# Patient Record
Sex: MALE | Race: WHITE | Employment: OTHER | URBAN - METROPOLITAN AREA
[De-identification: names, ages, dates, MRNs, and addresses within clinical notes are randomized per-mention and may not be internally consistent; named-entity substitution may affect disease eponyms.]

---

## 2017-01-03 ENCOUNTER — TELEPHONE (OUTPATIENT)
Dept: FAMILY MEDICINE CLINIC | Age: 57
End: 2017-01-03

## 2017-01-03 ENCOUNTER — TELEPHONE (OUTPATIENT)
Dept: ORTHOPEDIC SURGERY | Age: 57
End: 2017-01-03

## 2017-01-03 RX ORDER — NAPROXEN 500 MG/1
500 TABLET ORAL 2 TIMES DAILY WITH MEALS
Qty: 40 TABLET | Refills: 0 | Status: SHIPPED | OUTPATIENT
Start: 2017-01-03 | End: 2017-01-23 | Stop reason: SDUPTHER

## 2017-01-04 ENCOUNTER — OFFICE VISIT (OUTPATIENT)
Dept: ORTHOPEDIC SURGERY | Age: 57
End: 2017-01-04

## 2017-01-04 ENCOUNTER — TELEPHONE (OUTPATIENT)
Dept: ORTHOPEDIC SURGERY | Age: 57
End: 2017-01-04

## 2017-01-04 VITALS
BODY MASS INDEX: 22.91 KG/M2 | WEIGHT: 146 LBS | HEART RATE: 92 BPM | DIASTOLIC BLOOD PRESSURE: 81 MMHG | HEIGHT: 67 IN | SYSTOLIC BLOOD PRESSURE: 139 MMHG

## 2017-01-04 DIAGNOSIS — S83.241D TEAR OF MEDIAL MENISCUS OF RIGHT KNEE, CURRENT, UNSPECIFIED TEAR TYPE, SUBSEQUENT ENCOUNTER: Primary | ICD-10-CM

## 2017-01-04 PROCEDURE — 99213 OFFICE O/P EST LOW 20 MIN: CPT | Performed by: ORTHOPAEDIC SURGERY

## 2017-01-09 ENCOUNTER — OFFICE VISIT (OUTPATIENT)
Dept: FAMILY MEDICINE CLINIC | Age: 57
End: 2017-01-09

## 2017-01-09 VITALS
WEIGHT: 150 LBS | DIASTOLIC BLOOD PRESSURE: 90 MMHG | SYSTOLIC BLOOD PRESSURE: 130 MMHG | OXYGEN SATURATION: 97 % | BODY MASS INDEX: 23.49 KG/M2 | HEART RATE: 100 BPM

## 2017-01-09 DIAGNOSIS — G56.22 ULNAR NEUROPATHY OF LEFT UPPER EXTREMITY: ICD-10-CM

## 2017-01-09 DIAGNOSIS — D75.89 MACROCYTOSIS WITHOUT ANEMIA: ICD-10-CM

## 2017-01-09 PROCEDURE — 99213 OFFICE O/P EST LOW 20 MIN: CPT | Performed by: FAMILY MEDICINE

## 2017-01-23 ENCOUNTER — PROCEDURE VISIT (OUTPATIENT)
Dept: NEUROLOGY | Age: 57
End: 2017-01-23

## 2017-01-23 ENCOUNTER — TELEPHONE (OUTPATIENT)
Dept: ORTHOPEDIC SURGERY | Age: 57
End: 2017-01-23

## 2017-01-23 DIAGNOSIS — G56.22 ULNAR NEUROPATHY OF LEFT UPPER EXTREMITY: Primary | ICD-10-CM

## 2017-01-23 PROCEDURE — 95886 MUSC TEST DONE W/N TEST COMP: CPT | Performed by: PSYCHIATRY & NEUROLOGY

## 2017-01-23 PROCEDURE — 95909 NRV CNDJ TST 5-6 STUDIES: CPT | Performed by: PSYCHIATRY & NEUROLOGY

## 2017-01-23 RX ORDER — NAPROXEN 500 MG/1
TABLET ORAL
Qty: 60 TABLET | Refills: 2 | Status: ON HOLD | OUTPATIENT
Start: 2017-01-23 | End: 2017-03-29 | Stop reason: HOSPADM

## 2017-01-31 ENCOUNTER — HOSPITAL ENCOUNTER (OUTPATIENT)
Dept: MRI IMAGING | Age: 57
Discharge: HOME | End: 2017-01-31
Attending: ORTHOPAEDIC SURGERY | Admitting: ORTHOPAEDIC SURGERY

## 2017-01-31 DIAGNOSIS — S83.241D TEAR OF MEDIAL MENISCUS OF RIGHT KNEE, CURRENT, UNSPECIFIED TEAR TYPE, SUBSEQUENT ENCOUNTER: ICD-10-CM

## 2017-01-31 DIAGNOSIS — S83.241D OTHER TEAR OF MEDIAL MENISCUS, CURRENT INJURY, RIGHT KNEE, SUBSEQUENT ENCOUNTER: ICD-10-CM

## 2017-02-02 LAB
A/G RATIO: 1.9 (CALC) (ref 1–2.5)
ALBUMIN SERPL-MCNC: 4.4 G/DL (ref 3.6–5.1)
ALP BLD-CCNC: 54 U/L (ref 40–115)
ALT SERPL-CCNC: 12 U/L (ref 9–46)
AST SERPL-CCNC: 15 U/L (ref 10–35)
BASOPHILS ABSOLUTE: 77 CELLS/UL (ref 0–200)
BASOPHILS RELATIVE PERCENT: 1.6 %
BILIRUB SERPL-MCNC: 0.6 MG/DL (ref 0.2–1.2)
BUN / CREAT RATIO: NORMAL (CALC) (ref 6–22)
BUN BLDV-MCNC: 12 MG/DL (ref 7–25)
CALCIUM SERPL-MCNC: 9.2 MG/DL (ref 8.6–10.3)
CHLORIDE BLD-SCNC: 106 MMOL/L (ref 98–110)
CHOLESTEROL, TOTAL: 218 MG/DL (ref 125–200)
CHOLESTEROL/HDL RATIO: 2.4 (CALC)
CHOLESTEROL: 129 MG/DL (CALC)
CO2: 26 MMOL/L (ref 20–31)
CREAT SERPL-MCNC: 0.8 MG/DL (ref 0.7–1.33)
EOSINOPHILS ABSOLUTE: 130 CELLS/UL (ref 15–500)
EOSINOPHILS RELATIVE PERCENT: 2.7 %
FOLATE: >24 NG/ML
GFR AFRICAN AMERICAN: 116 ML/MIN/1.73M2
GFR SERPL CREATININE-BSD FRML MDRD: 100 ML/MIN/1.73M2
GLOBULIN: 2.3 G/DL (CALC) (ref 1.9–3.7)
GLUCOSE BLD-MCNC: 95 MG/DL (ref 65–99)
HCT VFR BLD CALC: 43.6 % (ref 38.5–50)
HDLC SERPL-MCNC: 89 MG/DL
HEMOGLOBIN: 14.8 G/DL (ref 13.2–17.1)
LDL CHOLESTEROL CALCULATED: 117 MG/DL (CALC)
LYMPHOCYTES ABSOLUTE: 1373 CELLS/UL (ref 850–3900)
LYMPHOCYTES RELATIVE PERCENT: 28.6 %
MCH RBC QN AUTO: 32.4 PG (ref 27–33)
MCHC RBC AUTO-ENTMCNC: 33.8 G/DL (ref 32–36)
MCV RBC AUTO: 95.7 FL (ref 80–100)
MONOCYTES ABSOLUTE: 446 CELLS/UL (ref 200–950)
MONOCYTES RELATIVE PERCENT: 9.3 %
NEUTROPHILS ABSOLUTE: 2774 CELLS/UL (ref 1500–7800)
PDW BLD-RTO: 13.8 % (ref 11–15)
PLATELET # BLD: 265 THOUSAND/UL (ref 140–400)
PMV BLD AUTO: 7.6 FL (ref 7.5–12.5)
POTASSIUM SERPL-SCNC: 4.6 MMOL/L (ref 3.5–5.3)
RBC # BLD: 4.55 MILLION/UL (ref 4.2–5.8)
SEGMENTED NEUTROPHILS RELATIVE PERCENT: 57.8 %
SODIUM BLD-SCNC: 142 MMOL/L (ref 135–146)
TOTAL PROTEIN: 6.7 G/DL (ref 6.1–8.1)
TRIGL SERPL-MCNC: 60 MG/DL
VITAMIN B-12: 390 PG/ML (ref 200–1100)
WBC # BLD: 4.8 THOUSAND/UL (ref 3.8–10.8)

## 2017-02-06 DIAGNOSIS — G56.22 ULNAR NEUROPATHY OF LEFT UPPER EXTREMITY: Primary | ICD-10-CM

## 2017-02-20 ENCOUNTER — TELEPHONE (OUTPATIENT)
Dept: FAMILY MEDICINE CLINIC | Age: 57
End: 2017-02-20

## 2017-02-22 ENCOUNTER — OFFICE VISIT (OUTPATIENT)
Dept: ORTHOPEDIC SURGERY | Age: 57
End: 2017-02-22

## 2017-02-22 VITALS
RESPIRATION RATE: 16 BRPM | DIASTOLIC BLOOD PRESSURE: 85 MMHG | HEIGHT: 67 IN | BODY MASS INDEX: 23.54 KG/M2 | WEIGHT: 150 LBS | HEART RATE: 107 BPM | SYSTOLIC BLOOD PRESSURE: 139 MMHG

## 2017-02-22 DIAGNOSIS — G56.22 CUBITAL TUNNEL SYNDROME ON LEFT: ICD-10-CM

## 2017-02-22 PROCEDURE — 99243 OFF/OP CNSLTJ NEW/EST LOW 30: CPT | Performed by: ORTHOPAEDIC SURGERY

## 2017-02-24 ENCOUNTER — OFFICE VISIT (OUTPATIENT)
Dept: ORTHOPEDIC SURGERY | Age: 57
End: 2017-02-24

## 2017-02-24 VITALS
HEART RATE: 71 BPM | DIASTOLIC BLOOD PRESSURE: 83 MMHG | WEIGHT: 150 LBS | BODY MASS INDEX: 23.54 KG/M2 | SYSTOLIC BLOOD PRESSURE: 129 MMHG | HEIGHT: 67 IN

## 2017-02-24 DIAGNOSIS — M25.461 KNEE EFFUSION, RIGHT: Primary | ICD-10-CM

## 2017-02-24 DIAGNOSIS — M87.051 AVASCULAR NECROSIS OF MEDIAL CONDYLE OF RIGHT FEMUR (HCC): ICD-10-CM

## 2017-02-24 PROCEDURE — 99213 OFFICE O/P EST LOW 20 MIN: CPT | Performed by: ORTHOPAEDIC SURGERY

## 2017-02-28 ENCOUNTER — TELEPHONE (OUTPATIENT)
Dept: ORTHOPEDIC SURGERY | Age: 57
End: 2017-02-28

## 2017-03-13 ENCOUNTER — HOSPITAL ENCOUNTER (OUTPATIENT)
Dept: PREADMISSION TESTING | Age: 57
Discharge: HOME | End: 2017-03-13
Attending: ORTHOPAEDIC SURGERY | Admitting: ORTHOPAEDIC SURGERY

## 2017-03-13 VITALS — BODY MASS INDEX: 21.22 KG/M2 | HEIGHT: 68 IN | WEIGHT: 140 LBS

## 2017-03-13 LAB
ABO/RH: NORMAL
ALBUMIN SERPL-MCNC: 4.6 G/DL (ref 3.4–5)
ANION GAP SERPL CALCULATED.3IONS-SCNC: 11 MMOL/L (ref 3–16)
ANTIBODY SCREEN: NORMAL
APTT: 29.7 SEC (ref 21–31.8)
BASOPHILS ABSOLUTE: 0 K/UL (ref 0–0.2)
BASOPHILS RELATIVE PERCENT: 0.5 %
BILIRUBIN URINE: NEGATIVE
BLOOD, URINE: NEGATIVE
BUN BLDV-MCNC: 10 MG/DL (ref 7–20)
CALCIUM SERPL-MCNC: 9.5 MG/DL (ref 8.3–10.6)
CHLORIDE BLD-SCNC: 102 MMOL/L (ref 99–110)
CLARITY: CLEAR
CO2: 30 MMOL/L (ref 21–32)
COLOR: YELLOW
CREAT SERPL-MCNC: 0.7 MG/DL (ref 0.9–1.3)
EOSINOPHILS ABSOLUTE: 0.2 K/UL (ref 0–0.6)
EOSINOPHILS RELATIVE PERCENT: 2.4 %
EPITHELIAL CELLS, UA: 1 /HPF (ref 0–5)
GFR AFRICAN AMERICAN: >60
GFR NON-AFRICAN AMERICAN: >60
GLUCOSE BLD-MCNC: 84 MG/DL (ref 70–99)
GLUCOSE URINE: NEGATIVE MG/DL
HCT VFR BLD CALC: 44.6 % (ref 40.5–52.5)
HEMOGLOBIN: 15.3 G/DL (ref 13.5–17.5)
HYALINE CASTS: 0 /HPF (ref 0–8)
INR BLD: 0.88 (ref 0.85–1.15)
KETONES, URINE: NEGATIVE MG/DL
LEUKOCYTE ESTERASE, URINE: NEGATIVE
LYMPHOCYTES ABSOLUTE: 1.3 K/UL (ref 1–5.1)
LYMPHOCYTES RELATIVE PERCENT: 17.8 %
MCH RBC QN AUTO: 32.7 PG (ref 26–34)
MCHC RBC AUTO-ENTMCNC: 34.2 G/DL (ref 31–36)
MCV RBC AUTO: 95.8 FL (ref 80–100)
MICROSCOPIC EXAMINATION: YES
MONOCYTES ABSOLUTE: 1 K/UL (ref 0–1.3)
MONOCYTES RELATIVE PERCENT: 12.8 %
NEUTROPHILS ABSOLUTE: 5 K/UL (ref 1.7–7.7)
NEUTROPHILS RELATIVE PERCENT: 66.5 %
NITRITE, URINE: NEGATIVE
PDW BLD-RTO: 13.4 % (ref 12.4–15.4)
PH UA: 6
PLATELET # BLD: 251 K/UL (ref 135–450)
PMV BLD AUTO: 7 FL (ref 5–10.5)
POTASSIUM SERPL-SCNC: 4.4 MMOL/L (ref 3.5–5.1)
PROTEIN UA: 30 MG/DL
PROTHROMBIN TIME: 9.9 SEC (ref 9.6–13)
RBC # BLD: 4.66 M/UL (ref 4.2–5.9)
RBC UA: 5 /HPF (ref 0–4)
SODIUM BLD-SCNC: 143 MMOL/L (ref 136–145)
SPECIFIC GRAVITY UA: 1.03
URINE TYPE: ABNORMAL
UROBILINOGEN, URINE: 0.2 E.U./DL
WBC # BLD: 7.5 K/UL (ref 4–11)
WBC UA: 0 /HPF (ref 0–5)

## 2017-03-13 RX ORDER — SODIUM CHLORIDE, SODIUM LACTATE, POTASSIUM CHLORIDE, CALCIUM CHLORIDE 600; 310; 30; 20 MG/100ML; MG/100ML; MG/100ML; MG/100ML
INJECTION, SOLUTION INTRAVENOUS CONTINUOUS
Status: CANCELLED | OUTPATIENT
Start: 2017-03-28

## 2017-03-13 RX ORDER — LIDOCAINE HYDROCHLORIDE 10 MG/ML
0.5 INJECTION, SOLUTION EPIDURAL; INFILTRATION; INTRACAUDAL; PERINEURAL ONCE
Status: CANCELLED | OUTPATIENT
Start: 2017-03-28

## 2017-03-13 RX ORDER — CELECOXIB 200 MG/1
400 CAPSULE ORAL
Status: CANCELLED | OUTPATIENT
Start: 2017-03-28 | End: 2017-03-28

## 2017-03-13 ASSESSMENT — COPD QUESTIONNAIRES: CAT_SEVERITY: MODERATE

## 2017-03-14 LAB
EKG ATRIAL RATE: 81 BPM
EKG DIAGNOSIS: NORMAL
EKG P AXIS: 59 DEGREES
EKG P-R INTERVAL: 136 MS
EKG Q-T INTERVAL: 364 MS
EKG QRS DURATION: 82 MS
EKG QTC CALCULATION (BAZETT): 422 MS
EKG R AXIS: 67 DEGREES
EKG T AXIS: 44 DEGREES
EKG VENTRICULAR RATE: 81 BPM
URINE CULTURE, ROUTINE: NORMAL

## 2017-03-14 PROCEDURE — 93010 ELECTROCARDIOGRAM REPORT: CPT | Performed by: INTERNAL MEDICINE

## 2017-03-16 LAB — MRSA CULTURE ONLY: NORMAL

## 2017-03-17 ENCOUNTER — OFFICE VISIT (OUTPATIENT)
Dept: FAMILY MEDICINE CLINIC | Age: 57
End: 2017-03-17

## 2017-03-17 VITALS
OXYGEN SATURATION: 98 % | WEIGHT: 154 LBS | SYSTOLIC BLOOD PRESSURE: 130 MMHG | BODY MASS INDEX: 25.66 KG/M2 | HEIGHT: 65 IN | DIASTOLIC BLOOD PRESSURE: 75 MMHG | HEART RATE: 94 BPM

## 2017-03-17 DIAGNOSIS — Z01.818 PRE-OP EXAM: Primary | ICD-10-CM

## 2017-03-17 DIAGNOSIS — Z83.2 FAMILY HISTORY OF CLOTTING DISORDER: ICD-10-CM

## 2017-03-17 PROCEDURE — 99213 OFFICE O/P EST LOW 20 MIN: CPT | Performed by: FAMILY MEDICINE

## 2017-03-20 ENCOUNTER — HOSPITAL ENCOUNTER (OUTPATIENT)
Dept: OTHER | Age: 57
Discharge: HOME | End: 2017-03-20
Attending: FAMILY MEDICINE | Admitting: FAMILY MEDICINE

## 2017-03-20 DIAGNOSIS — Z01.818 PRE-OP EXAM: ICD-10-CM

## 2017-03-20 DIAGNOSIS — Z83.2 FAMILY HISTORY OF CLOTTING DISORDER: ICD-10-CM

## 2017-03-23 LAB
FACTOR V LEIDEN: NEGATIVE
SPECIMEN: NORMAL

## 2017-03-28 PROBLEM — M87.051 AVASCULAR NECROSIS OF MEDIAL CONDYLE OF RIGHT FEMUR (HCC): Status: ACTIVE | Noted: 2017-03-28

## 2017-03-31 ENCOUNTER — HOSPITAL ENCOUNTER (OUTPATIENT)
Dept: PHYSICAL THERAPY | Age: 57
Discharge: HOME | End: 2017-03-31
Admitting: ORTHOPAEDIC SURGERY

## 2017-03-31 ASSESSMENT — PAIN DESCRIPTION - LOCATION: LOCATION: KNEE

## 2017-03-31 ASSESSMENT — PAIN DESCRIPTION - ORIENTATION: ORIENTATION: RIGHT

## 2017-03-31 ASSESSMENT — PAIN SCALES - GENERAL: PAINLEVEL_OUTOF10: 2

## 2017-03-31 ASSESSMENT — PAIN DESCRIPTION - PAIN TYPE: TYPE: SURGICAL PAIN

## 2017-04-10 ENCOUNTER — OFFICE VISIT (OUTPATIENT)
Dept: ORTHOPEDIC SURGERY | Age: 57
End: 2017-04-10

## 2017-04-10 VITALS
HEART RATE: 79 BPM | SYSTOLIC BLOOD PRESSURE: 131 MMHG | HEIGHT: 66 IN | DIASTOLIC BLOOD PRESSURE: 68 MMHG | WEIGHT: 142 LBS | BODY MASS INDEX: 22.82 KG/M2

## 2017-04-10 DIAGNOSIS — M87.051 AVASCULAR NECROSIS OF MEDIAL CONDYLE OF RIGHT FEMUR (HCC): Primary | ICD-10-CM

## 2017-04-10 PROCEDURE — 99024 POSTOP FOLLOW-UP VISIT: CPT | Performed by: ORTHOPAEDIC SURGERY

## 2017-04-10 PROCEDURE — 73560 X-RAY EXAM OF KNEE 1 OR 2: CPT | Performed by: ORTHOPAEDIC SURGERY

## 2017-05-03 ENCOUNTER — OFFICE VISIT (OUTPATIENT)
Dept: ORTHOPEDIC SURGERY | Age: 57
End: 2017-05-03

## 2017-05-03 VITALS — WEIGHT: 142 LBS | BODY MASS INDEX: 22.82 KG/M2 | HEIGHT: 66 IN

## 2017-05-03 DIAGNOSIS — M87.051 AVASCULAR NECROSIS OF MEDIAL CONDYLE OF RIGHT FEMUR (HCC): Primary | ICD-10-CM

## 2017-05-03 PROCEDURE — 99024 POSTOP FOLLOW-UP VISIT: CPT | Performed by: ORTHOPAEDIC SURGERY

## 2017-05-03 RX ORDER — IBUPROFEN 200 MG
200 TABLET ORAL EVERY 6 HOURS PRN
COMMUNITY
End: 2022-08-16

## 2017-05-24 ENCOUNTER — OFFICE VISIT (OUTPATIENT)
Dept: ORTHOPEDIC SURGERY | Age: 57
End: 2017-05-24

## 2017-05-24 VITALS
SYSTOLIC BLOOD PRESSURE: 134 MMHG | BODY MASS INDEX: 24.24 KG/M2 | DIASTOLIC BLOOD PRESSURE: 70 MMHG | HEART RATE: 79 BPM | HEIGHT: 64 IN | WEIGHT: 142 LBS

## 2017-05-24 DIAGNOSIS — M87.051 AVASCULAR NECROSIS OF MEDIAL CONDYLE OF RIGHT FEMUR (HCC): Primary | ICD-10-CM

## 2017-05-24 PROCEDURE — 73560 X-RAY EXAM OF KNEE 1 OR 2: CPT | Performed by: ORTHOPAEDIC SURGERY

## 2017-05-24 PROCEDURE — 99024 POSTOP FOLLOW-UP VISIT: CPT | Performed by: ORTHOPAEDIC SURGERY

## 2017-07-07 ENCOUNTER — OFFICE VISIT (OUTPATIENT)
Dept: FAMILY MEDICINE CLINIC | Age: 57
End: 2017-07-07

## 2017-07-07 VITALS
HEART RATE: 90 BPM | OXYGEN SATURATION: 97 % | BODY MASS INDEX: 24.72 KG/M2 | DIASTOLIC BLOOD PRESSURE: 72 MMHG | SYSTOLIC BLOOD PRESSURE: 136 MMHG | WEIGHT: 144 LBS

## 2017-07-07 DIAGNOSIS — R53.82 CHRONIC FATIGUE: Primary | ICD-10-CM

## 2017-07-07 DIAGNOSIS — H61.22 IMPACTED CERUMEN OF LEFT EAR: ICD-10-CM

## 2017-07-07 PROBLEM — K40.90 RIGHT INGUINAL HERNIA: Status: ACTIVE | Noted: 2017-07-07

## 2017-07-07 PROBLEM — K40.90 RIGHT INGUINAL HERNIA: Status: RESOLVED | Noted: 2017-07-07 | Resolved: 2017-07-07

## 2017-07-07 PROCEDURE — 99213 OFFICE O/P EST LOW 20 MIN: CPT | Performed by: FAMILY MEDICINE

## 2017-07-07 PROCEDURE — 69209 REMOVE IMPACTED EAR WAX UNI: CPT | Performed by: FAMILY MEDICINE

## 2017-07-21 ENCOUNTER — TELEPHONE (OUTPATIENT)
Dept: ORTHOPEDIC SURGERY | Age: 57
End: 2017-07-21

## 2017-07-24 ENCOUNTER — OFFICE VISIT (OUTPATIENT)
Dept: ORTHOPEDIC SURGERY | Age: 57
End: 2017-07-24

## 2017-07-24 ENCOUNTER — TELEPHONE (OUTPATIENT)
Dept: ORTHOPEDIC SURGERY | Age: 57
End: 2017-07-24

## 2017-07-24 VITALS
HEIGHT: 67 IN | HEART RATE: 70 BPM | WEIGHT: 142 LBS | BODY MASS INDEX: 22.29 KG/M2 | DIASTOLIC BLOOD PRESSURE: 78 MMHG | SYSTOLIC BLOOD PRESSURE: 124 MMHG

## 2017-07-24 DIAGNOSIS — M87.051 AVASCULAR NECROSIS OF MEDIAL CONDYLE OF RIGHT FEMUR (HCC): Primary | ICD-10-CM

## 2017-07-24 PROCEDURE — 99213 OFFICE O/P EST LOW 20 MIN: CPT | Performed by: ORTHOPAEDIC SURGERY

## 2017-07-24 PROCEDURE — 73562 X-RAY EXAM OF KNEE 3: CPT | Performed by: ORTHOPAEDIC SURGERY

## 2017-10-19 ENCOUNTER — TELEPHONE (OUTPATIENT)
Dept: ORTHOPEDIC SURGERY | Age: 57
End: 2017-10-19

## 2017-11-02 ENCOUNTER — TELEPHONE (OUTPATIENT)
Dept: ORTHOPEDIC SURGERY | Age: 57
End: 2017-11-02

## 2017-11-14 ENCOUNTER — OFFICE VISIT (OUTPATIENT)
Dept: ORTHOPEDIC SURGERY | Age: 57
End: 2017-11-14

## 2017-11-14 VITALS
RESPIRATION RATE: 16 BRPM | HEIGHT: 67 IN | DIASTOLIC BLOOD PRESSURE: 82 MMHG | WEIGHT: 140 LBS | HEART RATE: 80 BPM | SYSTOLIC BLOOD PRESSURE: 131 MMHG | BODY MASS INDEX: 21.97 KG/M2

## 2017-11-14 DIAGNOSIS — G57.62 MORTON'S NEUROMA OF SECOND INTERSPACE OF LEFT FOOT: ICD-10-CM

## 2017-11-14 DIAGNOSIS — M79.672 LEFT FOOT PAIN: Primary | ICD-10-CM

## 2017-11-14 PROCEDURE — G8427 DOCREV CUR MEDS BY ELIG CLIN: HCPCS | Performed by: ORTHOPAEDIC SURGERY

## 2017-11-14 PROCEDURE — 99214 OFFICE O/P EST MOD 30 MIN: CPT | Performed by: ORTHOPAEDIC SURGERY

## 2017-11-14 PROCEDURE — G8420 CALC BMI NORM PARAMETERS: HCPCS | Performed by: ORTHOPAEDIC SURGERY

## 2017-11-14 PROCEDURE — 3017F COLORECTAL CA SCREEN DOC REV: CPT | Performed by: ORTHOPAEDIC SURGERY

## 2017-11-14 PROCEDURE — 4004F PT TOBACCO SCREEN RCVD TLK: CPT | Performed by: ORTHOPAEDIC SURGERY

## 2017-11-14 PROCEDURE — G8484 FLU IMMUNIZE NO ADMIN: HCPCS | Performed by: ORTHOPAEDIC SURGERY

## 2017-11-14 PROCEDURE — 73630 X-RAY EXAM OF FOOT: CPT | Performed by: ORTHOPAEDIC SURGERY

## 2017-11-14 NOTE — PROGRESS NOTES
CHIEF COMPLAINT: Left forefoot swelling/2nd web space Fernandez's neuroma. HISTORY:  Mr. Amezcua Ek 62 y.o.  male presents today for the first visit for evaluation of left forefoot swelling which started 1 1/2 years ago. He feels like he has a wadded up sock in his shoes and that his 2nd and 3rd toes are . He is complaining of dull  Pain with pressure. Pain is increase with pressure applied to 2nd webspace left foot. Pain radiates to 2nd and 3rd toes, with mild numbness and tingling sensation. 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 No other complaint. History reviewed. No pertinent past medical history. Past Surgical History:   Procedure Laterality Date    APPENDECTOMY      COLONOSCOPY      KNEE ARTHROPLASTY Right 03/28/2017    RIGHT PARTIAL KNEE ARTHROPLASTY           SHOULDER SURGERY Right        Social History     Social History    Marital status:      Spouse name: N/A    Number of children: N/A    Years of education: N/A     Occupational History    Not on file.      Social History Main Topics    Smoking status: Current Some Day Smoker     Types: Cigars    Smokeless tobacco: Never Used      Comment: one cigar daily     Alcohol use Yes      Comment: heavy, 5/day beer    Drug use: No    Sexual activity: Not on file     Other Topics Concern    Not on file     Social History Narrative    No narrative on file       Family History   Problem Relation Age of Onset    Heart Disease Brother 64    Hypertension Father     Stroke Father     Hypertension Brother     Alcohol Abuse Brother     Other Brother      Factor V Leiden    Other Mother      dementia       Current Outpatient Prescriptions on File Prior to Visit   Medication Sig Dispense Refill    ibuprofen (ADVIL;MOTRIN) 200 MG tablet Take 200 mg by mouth every 6 hours as needed for Pain      Multiple Vitamins-Minerals (MULTIVITAMIN ADULT PO) Take by

## 2017-12-05 ENCOUNTER — OFFICE VISIT (OUTPATIENT)
Dept: FAMILY MEDICINE CLINIC | Age: 57
End: 2017-12-05

## 2017-12-05 VITALS
BODY MASS INDEX: 23.46 KG/M2 | TEMPERATURE: 97.4 F | HEIGHT: 66 IN | HEART RATE: 97 BPM | DIASTOLIC BLOOD PRESSURE: 68 MMHG | SYSTOLIC BLOOD PRESSURE: 112 MMHG | OXYGEN SATURATION: 98 % | WEIGHT: 146 LBS

## 2017-12-05 DIAGNOSIS — Z01.818 PREOP EXAMINATION: Primary | ICD-10-CM

## 2017-12-05 DIAGNOSIS — G56.22 ENTRAPMENT OF LEFT ULNAR NERVE: ICD-10-CM

## 2017-12-05 PROCEDURE — 3017F COLORECTAL CA SCREEN DOC REV: CPT | Performed by: PHYSICIAN ASSISTANT

## 2017-12-05 PROCEDURE — 99242 OFF/OP CONSLTJ NEW/EST SF 20: CPT | Performed by: PHYSICIAN ASSISTANT

## 2017-12-05 PROCEDURE — G8484 FLU IMMUNIZE NO ADMIN: HCPCS | Performed by: PHYSICIAN ASSISTANT

## 2017-12-05 PROCEDURE — G8427 DOCREV CUR MEDS BY ELIG CLIN: HCPCS | Performed by: PHYSICIAN ASSISTANT

## 2017-12-05 PROCEDURE — G8420 CALC BMI NORM PARAMETERS: HCPCS | Performed by: PHYSICIAN ASSISTANT

## 2017-12-05 NOTE — PROGRESS NOTES
Preoperative Consultation    Sonido Rios  YOB: 1960    This patient presents to the office today for a preoperative consultation at the request of surgeon, Dr. Esther Gregorio, who plans on performing left ulnar nerve compression surgery on December 21 at [facility]. He has had severe pain of left elbow for over a year. He has no PMH that would hinder him from having surgery.     Planned anesthesia: General   Known anesthesia problems: None   Bleeding risk: No recent or remote history of abnormal bleeding  Personal or FH of DVT/PE: No but brother has Factor V    Patient Active Problem List   Diagnosis    Cigar smoker    Knee effusion, right    Ulnar neuropathy of left upper extremity    Macrocytosis without anemia    Cubital tunnel syndrome on left    Family history of clotting disorder    3/28/17 Partial  RIGHT knee arthroplasty     Chronic fatigue     Past Surgical History:   Procedure Laterality Date    APPENDECTOMY      COLONOSCOPY      KNEE ARTHROPLASTY Right 03/28/2017    RIGHT PARTIAL KNEE ARTHROPLASTY           SHOULDER SURGERY Right        No Known Allergies  Outpatient Prescriptions Marked as Taking for the 12/5/17 encounter (Office Visit) with SCOTT Beyer   Medication Sig Dispense Refill    Garlic 0283 MG TABS Take by mouth      ibuprofen (ADVIL;MOTRIN) 200 MG tablet Take 200 mg by mouth every 6 hours as needed for Pain As needed      Multiple Vitamins-Minerals (MULTIVITAMIN ADULT PO) Take by mouth         Social History   Substance Use Topics    Smoking status: Current Some Day Smoker     Types: Cigars    Smokeless tobacco: Never Used      Comment: one cigar daily     Alcohol use Yes      Comment: heavy, 5/day beer     Family History   Problem Relation Age of Onset    Heart Disease Brother 64    Hypertension Father     Stroke Father     Hypertension Brother     Alcohol Abuse Brother     Other Brother      Factor V Leiden    Other Mother dementia       Review of Systems  A comprehensive review of systems was negative except for what was noted in the HPI. Physical Exam   /68   Pulse 97   Temp 97.4 °F (36.3 °C)   Ht 5' 5.5\" (1.664 m)   Wt 146 lb (66.2 kg)   SpO2 98%   BMI 23.93 kg/m²   Weight: 146 lb (66.2 kg)   Constitutional: He is oriented to person, place, and time. He appears well-developed and well-nourished. No distress. HENT:   Head: Normocephalic and atraumatic. Mouth/Throat: Uvula is midline, oropharynx is clear and moist and mucous membranes are normal.   Eyes: Conjunctivae and EOM are normal. Pupils are equal, round, and reactive to light. Neck: Trachea normal and normal range of motion. Neck supple. No JVD present. Carotid bruit is not present. No mass and no thyromegaly present. Cardiovascular: Normal rate, regular rhythm, normal heart sounds and intact distal pulses. Exam reveals no gallop and no friction rub. No murmur heard. Pulmonary/Chest: Effort normal and breath sounds normal. No respiratory distress. He has no wheezes. He has no rales. Abdominal: Soft. Normal aorta and bowel sounds are normal. He exhibits no distension and no mass. There is no hepatosplenomegaly. No tenderness. Musculoskeletal: He exhibits no edema and no tenderness. Neurological: He is alert and oriented to person, place, and time. He has normal strength. No cranial nerve deficit or sensory deficit. Coordination and gait normal.   Skin: Skin is warm and dry. No rash noted. No erythema. EKG Interpretation:  NA    Lab Review NA       Assessment:         Ismael Cuevas was seen today for pre-op exam.    Diagnoses and all orders for this visit:    Preop examination    Entrapment of left ulnar nerve            Plan:     1. Preoperative workup as follows: none  2. Change in medication regimen before surgery: Discontinue NSAIDs 7 days before surgery  3. No contraindications to planned surgery  4. Cleared for surgery.     Note electronically

## 2017-12-11 ENCOUNTER — TELEPHONE (OUTPATIENT)
Dept: ORTHOPEDIC SURGERY | Age: 57
End: 2017-12-11

## 2017-12-11 NOTE — TELEPHONE ENCOUNTER
Pt would like return call about whether or not this surg thurs is absolutely necessary. He doesn't want to cancel but wants to be sure it is beneficial for him to proceed. He has no shoulder pain right now.    Please call at [phone]

## 2017-12-20 ENCOUNTER — PAT TELEPHONE (OUTPATIENT)
Dept: PREADMISSION TESTING | Age: 57
End: 2017-12-20

## 2017-12-20 VITALS — BODY MASS INDEX: 23.46 KG/M2 | HEIGHT: 66 IN | WEIGHT: 146 LBS

## 2017-12-20 NOTE — PRE-PROCEDURE INSTRUCTIONS
[address] [facility] time_0615___________        Surgery time_0745___________    Take the following medications with a sip of water:    Do not eat or drink anything after 12:00 midnight prior to your surgery. This includes water chewing gum, mints and ice chips. You may brush your teeth and gargle the morning of your surgery, but do not swallow the water     Please see your family doctor/pediatrician for a history and physical and/or concerning medications. Bring any test results/reports from your physicians office. If you are under the care of a heart doctor or specialist doctor, please be aware that you may be asked to them for clearance    You may be asked to stop blood thinners such as Coumadin, Plavix, Fragmin, Lovenox, etc., or any anti-inflammatories such as:  Aspirin, Ibuprofen, Advil, Naproxen prior to your surgery. We also ask that you stop any OTC medications such as fish oil, vitamin E, glucosamine, garlic, Multivitamins, COQ 10, etc.    We ask that you do not smoke 24 hours prior to surgery  We ask that you do not  drink any alcoholic beverages 24 hours prior to surgery     You must make arrangements for a responsible adult to take you home after your surgery. For your safety you will not be allowed to leave alone or drive yourself home. Your surgery will be cancelled if you do not have a ride home. Also for your safety, it is strongly suggested that someone stay with you the first 24 hours after your surgery. A parent or legal guardian must accompany a child scheduled for surgery and plan to stay at the hospital until the child is discharged. Please do not bring other children with you. For your comfort, please wear simple loose fitting clothing to the hospital.  Please do not bring valuables.     Do not wear any make-up or nail polish on your fingers or toes      For your safety, please do not wear any jewelry or body piercing's on the day of surgery. All jewelry must be removed. If you have dentures, they will be removed before going to operating room. For your convenience, we will provide you with a container. If you wear contact lenses or glasses, they will be removed, please bring a case for them. If you have a living will and a durable power of  for healthcare, please bring in a copy. As part of our patient safety program to minimize surgical site infections, we ask you to do the following:    · Please notify your surgeon if you develop any illness between         now and the  day of your surgery. · This includes a cough, cold, fever, sore throat, nausea,         or vomiting, and diarrhea, etc.  ·  Please notify your surgeon if you experience dizziness, shortness         of breath or blurred vision between now and the time of your surgery. Do not shave your operative site 96 hours prior to surgery. For face and neck surgery, men may use an electric razor 48 hours   prior to surgery. You may shower the night before surgery or the morning of   your surgery with an antibacterial soap. You will need to bring a photo ID and insurance card    [facility] has an onsite pharmacy, would you like to utilize our pharmacy     If you will be staying overnight and use a C-pap machine, please bring   your C-pap to hospital     Our goal is to provide you with excellent care, therefore, visitors will be limited to two(2) in the room at a time so that we may focus on providing this care for you. Please contact pre-admission testing if you have any further questions. [facility] phone number:  [phone] [facility] fax number:  [phone]  Please note these are generalized instructions for all surgical cases, you may be provided with more specific instructions according to your surgery.

## 2017-12-21 ENCOUNTER — TELEPHONE (OUTPATIENT)
Dept: ORTHOPEDIC SURGERY | Age: 57
End: 2017-12-21

## 2017-12-21 ENCOUNTER — HOSPITAL ENCOUNTER (OUTPATIENT)
Dept: SURGERY | Age: 57
Discharge: HOME | End: 2017-12-21
Admitting: ORTHOPAEDIC SURGERY

## 2017-12-21 VITALS
SYSTOLIC BLOOD PRESSURE: 136 MMHG | WEIGHT: 147.49 LBS | HEART RATE: 74 BPM | DIASTOLIC BLOOD PRESSURE: 88 MMHG | BODY MASS INDEX: 23.15 KG/M2 | HEIGHT: 67 IN | OXYGEN SATURATION: 94 % | TEMPERATURE: 97.4 F | RESPIRATION RATE: 16 BRPM

## 2017-12-21 RX ORDER — HYDROMORPHONE HCL 110MG/55ML
0.5 PATIENT CONTROLLED ANALGESIA SYRINGE INTRAVENOUS EVERY 5 MIN PRN
Status: DISCONTINUED | OUTPATIENT
Start: 2017-12-21 | End: 2017-12-22 | Stop reason: HOSPADM

## 2017-12-21 RX ORDER — FENTANYL CITRATE 50 UG/ML
25 INJECTION, SOLUTION INTRAMUSCULAR; INTRAVENOUS EVERY 5 MIN PRN
Status: DISCONTINUED | OUTPATIENT
Start: 2017-12-21 | End: 2017-12-22 | Stop reason: HOSPADM

## 2017-12-21 RX ORDER — SODIUM CHLORIDE 0.9 % (FLUSH) 0.9 %
10 SYRINGE (ML) INJECTION EVERY 12 HOURS SCHEDULED
Status: DISCONTINUED | OUTPATIENT
Start: 2017-12-21 | End: 2017-12-22 | Stop reason: HOSPADM

## 2017-12-21 RX ORDER — PROMETHAZINE HYDROCHLORIDE 25 MG/ML
6.25 INJECTION, SOLUTION INTRAMUSCULAR; INTRAVENOUS
Status: ACTIVE | OUTPATIENT
Start: 2017-12-21 | End: 2017-12-21

## 2017-12-21 RX ORDER — SODIUM CHLORIDE 0.9 % (FLUSH) 0.9 %
10 SYRINGE (ML) INJECTION PRN
Status: DISCONTINUED | OUTPATIENT
Start: 2017-12-21 | End: 2017-12-22 | Stop reason: HOSPADM

## 2017-12-21 RX ORDER — LABETALOL HYDROCHLORIDE 5 MG/ML
5 INJECTION, SOLUTION INTRAVENOUS EVERY 10 MIN PRN
Status: DISCONTINUED | OUTPATIENT
Start: 2017-12-21 | End: 2017-12-22 | Stop reason: HOSPADM

## 2017-12-21 RX ORDER — SODIUM CHLORIDE 9 MG/ML
INJECTION, SOLUTION INTRAVENOUS CONTINUOUS
Status: DISCONTINUED | OUTPATIENT
Start: 2017-12-21 | End: 2017-12-22 | Stop reason: HOSPADM

## 2017-12-21 RX ADMIN — SODIUM CHLORIDE: 9 INJECTION, SOLUTION INTRAVENOUS at 06:52

## 2017-12-21 ASSESSMENT — PAIN SCALES - GENERAL
PAINLEVEL_OUTOF10: 0
PAINLEVEL_OUTOF10: 0

## 2017-12-21 NOTE — TELEPHONE ENCOUNTER
Berta Mcguire calling from [facility]   Patient having sx this morning and the insurance that we have on file for him, which is Pola Romberg is coming back as ineligible    Berta Mcguire states that she did try to call as well yesterday to let the office know this in case we wanted to reschedule or provide a diff insurance /    Berta Mcguire states at this point she   They are just going to make the patient self pay- and if he does happen to have a different ins says we will just have to adjust later

## 2017-12-21 NOTE — OP NOTE
nerve.  The nerve was traced proximally and circumferential control of the nerve was obtained. It was dissected proximally to the level of the connection between the biceps and triceps muscles, approximately 3 cm proximal to the medial epicondyle. It was carefully mobilized from the medial intermuscular septum. It was traced distally, being completely freed along the course of the cubital tunnel, and was dissected distally to the level of the second motor branch as it split the heads of the FCU muscle. There was a tight fibrous band at the confluence of the heads of the FCU which was carefully divided. Digital palpation revealed that there were no further proximal or distal constriction about the nerve. The Ulnar Nerve was found to be stable in it's groove without tendency toward subluxation or snapping. Attention was turned to the palm. A 2 cm longitudinal incision was fashioned at the base of the palm, paralleling the longitudinal thenar crease. Dissection was carried carefully through the subcutaneous tissue identifying and protecting the neurovascular structures. The palmar fascia was incised longitudinally, exposing the transverse carpal ligament. The transverse carpal ligament was incised from its proximal to distal most extent, under direct visualization. The terminal 2 cm of antebrachial fascia was similarly incised under direct visualization. The contents of the carpal tunnel were inspected and found to be free of mass, lesion or other abnormality. Digital palpation revealed no further constriction about the median nerve. The incisions were all irrigated copiously with sterile saline for irrigation. The pneumo-tourniquet was deflated after a period of 6 minutes elevation & the fingers were immediately pink and well perfused. Hemostasis was easily obtained with direct pressure and electrocautery. The incisions were closed in layers with interrupted absorbable sutures.   The wounds were dressed with Adaptic & dry sterile dressings after local anesthetic was instilled for postoperative analgesia. Mr. Vinay Saleh was awakened from anesthesia having tolerated the procedure without apparent complication. He was returned to the recovery room in stable condition. At the conclusion of the procedure, all needle, instrument and sponge counts were correct. Tay Leach MD   12/21/2017, 8:03 AM

## 2017-12-21 NOTE — ANESTHESIA PRE-OP
[facility] Department of Anesthesiology  Pre-Anesthesia Evaluation/Consultation       Name:  Vinay Saleh  : 1960  Age:  62 y.o. MRN:  [id number]  Date: 2017           Procedure (Scheduled):  Left ulnar nerve decompression at elbow, and left CTR  Surgeon:  Dr. Renato Leach     No Known Allergies  Patient Active Problem List   Diagnosis    Cigar smoker    Knee effusion, right    Ulnar neuropathy of left upper extremity    Macrocytosis without anemia    Cubital tunnel syndrome on left    Family history of clotting disorder    3/28/17 Partial  RIGHT knee arthroplasty     Chronic fatigue     History reviewed. No pertinent past medical history. Past Surgical History:   Procedure Laterality Date    APPENDECTOMY      COLONOSCOPY      KNEE ARTHROPLASTY Right 2017    RIGHT PARTIAL KNEE ARTHROPLASTY           SHOULDER SURGERY Right      Social History   Substance Use Topics    Smoking status: Current Some Day Smoker     Types: Cigars    Smokeless tobacco: Never Used      Comment: one cigar daily     Alcohol use Yes      Comment: heavy, 5/day beer     Medications  Current Outpatient Prescriptions on File Prior to Encounter   Medication Sig Dispense Refill    ibuprofen (ADVIL;MOTRIN) 200 MG tablet Take 200 mg by mouth every 6 hours as needed for Pain As needed      Garlic 5091 MG TABS Take by mouth      Multiple Vitamins-Minerals (MULTIVITAMIN ADULT PO) Take by mouth       No current facility-administered medications on file prior to encounter.       Current Outpatient Prescriptions   Medication Sig Dispense Refill    ibuprofen (ADVIL;MOTRIN) 200 MG tablet Take 200 mg by mouth every 6 hours as needed for Pain As needed      Garlic 1958 MG TABS Take by mouth      Multiple Vitamins-Minerals (MULTIVITAMIN ADULT PO) Take by mouth       Current Facility-Administered Medications   Medication Dose Route Frequency Provider Last Rate Last Dose    0.9 % ALT 12 02/01/2017     BMP    Lab Results   Component Value Date     03/29/2017    K 4.4 03/29/2017     03/29/2017    CO2 28 03/29/2017    BUN 9 03/29/2017    CREATININE 0.7 03/29/2017    CALCIUM 8.5 03/29/2017    GFRAA >60 03/29/2017    LABGLOM >60 03/29/2017    LABGLOM 100 02/01/2017    GLUCOSE 102 03/29/2017     POCGlucose  No results for input(s): GLUCOSE in the last 72 hours. Coags    Lab Results   Component Value Date    PROTIME 9.9 03/13/2017    INR 0.88 03/13/2017    APTT 29.7 17/12/8947     HCG (If Applicable) No results found for: PREGTESTUR, PREGSERUM, HCG, HCGQUANT   ABGs No results found for: PHART, PO2ART, OCP3TDR, KWD0EWB, BEART, J2VTEKPF   Type & Screen (If Applicable)  No results found for: LABABO, LABRH                         BMI: Wt Readings from Last 3 Encounters:       NPO Status:   Date of last liquid consumption: 12/20/17   Time of last liquid consumption: 2200   Date of last solid food consumption: 12/20/17      Time of last solid consumption: 2200       Anesthesia Evaluation  Patient summary reviewed no history of anesthetic complications:   Airway: Mallampati: II  TM distance: >3 FB   Neck ROM: full   Dental: normal exam         Pulmonary:Negative Pulmonary ROS and normal exam                               Cardiovascular:  Exercise tolerance: good (>4 METS),           Rhythm: regular  Rate: normal           Beta Blocker:  Not on Beta Blocker         Neuro/Psych:   (+) neuromuscular disease:,             GI/Hepatic/Renal: Neg GI/Hepatic/Renal ROS            Endo/Other: Negative Endo/Other ROS                    Abdominal:           Vascular: negative vascular ROS. Anesthesia Plan      general     ASA 2       Induction: intravenous. MIPS: Postoperative opioids intended and Prophylactic antiemetics administered. Anesthetic plan and risks discussed with patient and child/children. Plan discussed with CRNA.                   This

## 2017-12-27 ENCOUNTER — OFFICE VISIT (OUTPATIENT)
Dept: ORTHOPEDIC SURGERY | Age: 57
End: 2017-12-27

## 2017-12-27 VITALS — BODY MASS INDEX: 23.46 KG/M2 | WEIGHT: 146 LBS | RESPIRATION RATE: 16 BRPM | HEIGHT: 66 IN

## 2017-12-27 DIAGNOSIS — G56.22 CUBITAL TUNNEL SYNDROME ON LEFT: Primary | ICD-10-CM

## 2017-12-27 PROCEDURE — 99024 POSTOP FOLLOW-UP VISIT: CPT | Performed by: PHYSICIAN ASSISTANT

## 2017-12-27 NOTE — PATIENT INSTRUCTIONS
Postoperative Instructions After Ulnar Nerve Decompression    Dr. Shankar Banda. Siva        1. After bandages are removed one week from surgery, you may chose to wear a small bandage over the incision if you wish, though you do not need to. 2. Keep incision dry for a total of 12 days from the day of surgery. Thereafter, you may wash with mild soap and water and shower normally. 3. Once your stiches have fully disappeared, you should begin gently massaging the incision with Vitamin E (may use Vitamin E lotion or contents of Vitamin E capsule). 4. Work hard on motion of the fingers and wrist & elbow, straightening each finger fully and bending each finger fully, bending wrist forward and bending wrist backwards, fully straightening elbow and fully bending elbow. Do not be concerned if you experience discomfort. This will not damage the surgery. 5. You may begin using the hand & arm as it feels comfortable beginning 12-14 days from the day of surgery. You may not feel entirely comfortable gripping or lifting heavy objects for several weeks. 6. The stitches are dissolvable and may take up to three weeks from day of surgery to completely disappear. 7. You may expect to see some skin peel off around the incision. You may be left with a small area of pink baby skin. This is quite normal.    Thank you for choosing [facility]) Physicians for your Hand and Upper Extremity needs. If we can be of any further assistance to you, please do not hesitate to contact us.     Office Phone Number:  [phone]  or  [phone]

## 2018-10-11 ENCOUNTER — OFFICE VISIT (OUTPATIENT)
Dept: FAMILY MEDICINE CLINIC | Age: 58
End: 2018-10-11
Payer: COMMERCIAL

## 2018-10-11 VITALS
HEIGHT: 67 IN | DIASTOLIC BLOOD PRESSURE: 62 MMHG | WEIGHT: 145 LBS | OXYGEN SATURATION: 98 % | SYSTOLIC BLOOD PRESSURE: 120 MMHG | BODY MASS INDEX: 22.76 KG/M2 | HEART RATE: 89 BPM

## 2018-10-11 DIAGNOSIS — Z13.220 SCREENING, LIPID: Primary | ICD-10-CM

## 2018-10-11 DIAGNOSIS — Z11.4 SCREENING FOR HIV (HUMAN IMMUNODEFICIENCY VIRUS): ICD-10-CM

## 2018-10-11 DIAGNOSIS — Z11.59 ENCOUNTER FOR HEPATITIS C SCREENING TEST FOR LOW RISK PATIENT: ICD-10-CM

## 2018-10-11 DIAGNOSIS — R53.82 CHRONIC FATIGUE: ICD-10-CM

## 2018-10-11 DIAGNOSIS — Z12.5 SCREENING FOR PROSTATE CANCER: ICD-10-CM

## 2018-10-11 DIAGNOSIS — Z13.1 SCREENING FOR DIABETES MELLITUS: ICD-10-CM

## 2018-10-11 PROBLEM — G56.22 ULNAR NEUROPATHY OF LEFT UPPER EXTREMITY: Status: RESOLVED | Noted: 2017-01-09 | Resolved: 2018-10-11

## 2018-10-11 PROCEDURE — 3017F COLORECTAL CA SCREEN DOC REV: CPT | Performed by: PHYSICIAN ASSISTANT

## 2018-10-11 PROCEDURE — G8420 CALC BMI NORM PARAMETERS: HCPCS | Performed by: PHYSICIAN ASSISTANT

## 2018-10-11 PROCEDURE — G8427 DOCREV CUR MEDS BY ELIG CLIN: HCPCS | Performed by: PHYSICIAN ASSISTANT

## 2018-10-11 PROCEDURE — 99214 OFFICE O/P EST MOD 30 MIN: CPT | Performed by: PHYSICIAN ASSISTANT

## 2018-10-11 PROCEDURE — G8484 FLU IMMUNIZE NO ADMIN: HCPCS | Performed by: PHYSICIAN ASSISTANT

## 2018-10-11 PROCEDURE — 4004F PT TOBACCO SCREEN RCVD TLK: CPT | Performed by: PHYSICIAN ASSISTANT

## 2018-10-11 ASSESSMENT — ENCOUNTER SYMPTOMS
VOICE CHANGE: 0
CHEST TIGHTNESS: 0
COUGH: 0
BACK PAIN: 0
CONSTIPATION: 0
ABDOMINAL PAIN: 0
EYE PAIN: 0
TROUBLE SWALLOWING: 0
SORE THROAT: 0
DIARRHEA: 0
SHORTNESS OF BREATH: 0

## 2018-10-11 ASSESSMENT — PATIENT HEALTH QUESTIONNAIRE - PHQ9
SUM OF ALL RESPONSES TO PHQ QUESTIONS 1-9: 0
1. LITTLE INTEREST OR PLEASURE IN DOING THINGS: 0
SUM OF ALL RESPONSES TO PHQ QUESTIONS 1-9: 0
SUM OF ALL RESPONSES TO PHQ9 QUESTIONS 1 & 2: 0
2. FEELING DOWN, DEPRESSED OR HOPELESS: 0

## 2018-10-11 NOTE — PROGRESS NOTES
membranes are normal.   Eyes: Pupils are equal, round, and reactive to light. Conjunctivae are normal.   Neck: Neck supple. No thyromegaly present. Cardiovascular: Normal rate, regular rhythm and normal heart sounds. No murmur heard. Pulses:       Dorsalis pedis pulses are 2+ on the right side, and 2+ on the left side. Pulmonary/Chest: Effort normal and breath sounds normal. No respiratory distress. He has no decreased breath sounds. Abdominal: Soft. Normal appearance and bowel sounds are normal. He exhibits no distension and no mass. There is no hepatosplenomegaly. There is no tenderness. There is no rigidity, no rebound, no guarding and no CVA tenderness. No hernia. Musculoskeletal: Normal range of motion. He exhibits no edema. Lymphadenopathy:     He has no cervical adenopathy. Neurological: He is alert and oriented to person, place, and time. He has normal reflexes. Skin: Skin is warm, dry and intact. No rash noted. Psychiatric: He has a normal mood and affect. His speech is normal and behavior is normal. Thought content normal.       Assessment:      Bright Anne was seen today for annual exam.    Diagnoses and all orders for this visit:    Screening, lipid  -     LIPID PANEL; Future    Screening for diabetes mellitus  -     Comprehensive Metabolic Panel    Screening for prostate cancer  -     Psa screening    Encounter for hepatitis C screening test for low risk patient  -     HEPATITIS C ANTIBODY; Future    Screening for HIV (human immunodeficiency virus)  -     HIV-1 AND HIV-2 ANTIBODIES; Future    Chronic fatigue  -     CBC Auto Differential  -     TSH without Reflex             Plan:      Getting colonoscopy in November, get routine labs, eye exam, return in a year.          Krystyna Salinas, [address]

## 2019-03-27 ENCOUNTER — OFFICE VISIT (OUTPATIENT)
Dept: ORTHOPEDIC SURGERY | Age: 59
End: 2019-03-27
Payer: COMMERCIAL

## 2019-03-27 VITALS — RESPIRATION RATE: 16 BRPM | SYSTOLIC BLOOD PRESSURE: 146 MMHG | DIASTOLIC BLOOD PRESSURE: 85 MMHG | HEART RATE: 98 BPM

## 2019-03-27 DIAGNOSIS — M25.532 LEFT WRIST PAIN: Primary | ICD-10-CM

## 2019-03-27 PROCEDURE — G8420 CALC BMI NORM PARAMETERS: HCPCS | Performed by: ORTHOPAEDIC SURGERY

## 2019-03-27 PROCEDURE — 4004F PT TOBACCO SCREEN RCVD TLK: CPT | Performed by: ORTHOPAEDIC SURGERY

## 2019-03-27 PROCEDURE — 99214 OFFICE O/P EST MOD 30 MIN: CPT | Performed by: ORTHOPAEDIC SURGERY

## 2019-03-27 PROCEDURE — G8427 DOCREV CUR MEDS BY ELIG CLIN: HCPCS | Performed by: ORTHOPAEDIC SURGERY

## 2019-03-27 PROCEDURE — G8484 FLU IMMUNIZE NO ADMIN: HCPCS | Performed by: ORTHOPAEDIC SURGERY

## 2019-03-27 PROCEDURE — 3017F COLORECTAL CA SCREEN DOC REV: CPT | Performed by: ORTHOPAEDIC SURGERY

## 2019-09-16 ENCOUNTER — TELEPHONE (OUTPATIENT)
Dept: FAMILY MEDICINE CLINIC | Age: 59
End: 2019-09-16

## 2019-09-16 DIAGNOSIS — Z00.00 WELL ADULT EXAM: ICD-10-CM

## 2019-09-16 DIAGNOSIS — Z12.5 SCREENING FOR PROSTATE CANCER: ICD-10-CM
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
## 2019-09-27 ENCOUNTER — HOSPITAL ENCOUNTER (OUTPATIENT)
Age: 59
Discharge: HOME | End: 2019-09-27
Payer: COMMERCIAL

## 2019-09-27 DIAGNOSIS — Z00.00 WELL ADULT EXAM: ICD-10-CM
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
## 2019-09-27 DIAGNOSIS — Z12.5 SCREENING FOR PROSTATE CANCER: ICD-10-CM

## 2019-09-27 LAB
A/G RATIO: 1.7 (ref 1.1–2.2)
ALBUMIN SERPL-MCNC: 4.4 G/DL (ref 3.4–5)
ALP BLD-CCNC: 62 U/L (ref 40–129)
ALT SERPL-CCNC: 17 U/L (ref 10–40)
ANION GAP SERPL CALCULATED.3IONS-SCNC: 14 MMOL/L (ref 3–16)
AST SERPL-CCNC: 21 U/L (ref 15–37)
BASOPHILS ABSOLUTE: 0 K/UL (ref 0–0.2)
BASOPHILS RELATIVE PERCENT: 0.5 %
BILIRUB SERPL-MCNC: 0.4 MG/DL (ref 0–1)
BUN BLDV-MCNC: 7 MG/DL (ref 7–20)
CALCIUM SERPL-MCNC: 9.4 MG/DL (ref 8.3–10.6)
CHLORIDE BLD-SCNC: 104 MMOL/L (ref 99–110)
CHOLESTEROL, TOTAL: 230 MG/DL (ref 0–199)
CO2: 26 MMOL/L (ref 21–32)
CREAT SERPL-MCNC: 0.8 MG/DL (ref 0.9–1.3)
EOSINOPHILS ABSOLUTE: 0.2 K/UL (ref 0–0.6)
EOSINOPHILS RELATIVE PERCENT: 2.1 %
GFR AFRICAN AMERICAN: >60
GFR NON-AFRICAN AMERICAN: >60
GLOBULIN: 2.6 G/DL
GLUCOSE BLD-MCNC: 95 MG/DL (ref 70–99)
HBV SURFACE AB TITR SER: <3.5 MIU/ML
HCT VFR BLD CALC: 45.3 % (ref 40.5–52.5)
HDLC SERPL-MCNC: 82 MG/DL (ref 40–60)
HEMOGLOBIN: 15.7 G/DL (ref 13.5–17.5)
HEPATITIS C ANTIBODY INTERPRETATION: NORMAL
HIV AG/AB: NORMAL
HIV ANTIGEN: NORMAL
HIV-1 ANTIBODY: NORMAL
HIV-2 AB: NORMAL
LDL CHOLESTEROL CALCULATED: 138 MG/DL
LYMPHOCYTES ABSOLUTE: 1.1 K/UL (ref 1–5.1)
LYMPHOCYTES RELATIVE PERCENT: 14.6 %
MCH RBC QN AUTO: 34.5 PG (ref 26–34)
MCHC RBC AUTO-ENTMCNC: 34.8 G/DL (ref 31–36)
MCV RBC AUTO: 99.3 FL (ref 80–100)
MONOCYTES ABSOLUTE: 0.9 K/UL (ref 0–1.3)
MONOCYTES RELATIVE PERCENT: 11.3 %
NEUTROPHILS ABSOLUTE: 5.6 K/UL (ref 1.7–7.7)
NEUTROPHILS RELATIVE PERCENT: 71.5 %
PDW BLD-RTO: 13.2 % (ref 12.4–15.4)
PLATELET # BLD: 265 K/UL (ref 135–450)
PMV BLD AUTO: 7 FL (ref 5–10.5)
POTASSIUM SERPL-SCNC: 5.1 MMOL/L (ref 3.5–5.1)
PROSTATE SPECIFIC ANTIGEN: 0.94 NG/ML (ref 0–4)
RBC # BLD: 4.56 M/UL (ref 4.2–5.9)
SODIUM BLD-SCNC: 144 MMOL/L (ref 136–145)
TOTAL PROTEIN: 7 G/DL (ref 6.4–8.2)
TRIGL SERPL-MCNC: 52 MG/DL (ref 0–150)
TSH SERPL DL<=0.05 MIU/L-ACNC: 1.98 UIU/ML (ref 0.27–4.2)
VLDLC SERPL CALC-MCNC: 10 MG/DL
WBC # BLD: 7.8 K/UL (ref 4–11)

## 2019-09-27 PROCEDURE — 80061 LIPID PANEL: CPT

## 2019-09-27 PROCEDURE — 86702 HIV-2 ANTIBODY: CPT

## 2019-09-27 PROCEDURE — 86706 HEP B SURFACE ANTIBODY: CPT

## 2019-09-27 PROCEDURE — 84443 ASSAY THYROID STIM HORMONE: CPT

## 2019-09-27 PROCEDURE — 86803 HEPATITIS C AB TEST: CPT

## 2019-09-27 PROCEDURE — 86708 HEPATITIS A ANTIBODY: CPT

## 2019-09-27 PROCEDURE — 85025 COMPLETE CBC W/AUTO DIFF WBC: CPT

## 2019-09-27 PROCEDURE — 80053 COMPREHEN METABOLIC PANEL: CPT

## 2019-09-27 PROCEDURE — 36415 COLL VENOUS BLD VENIPUNCTURE: CPT

## 2019-09-27 PROCEDURE — 87390 HIV-1 AG IA: CPT

## 2019-09-27 PROCEDURE — 86701 HIV-1ANTIBODY: CPT

## 2019-09-27 PROCEDURE — 84153 ASSAY OF PSA TOTAL: CPT

## 2019-09-28 LAB — HAV AB SERPL IA-ACNC: NEGATIVE

## 2019-10-11 ENCOUNTER — OFFICE VISIT (OUTPATIENT)
Dept: FAMILY MEDICINE CLINIC | Age: 59
End: 2019-10-11
Payer: COMMERCIAL

## 2019-10-11 VITALS
DIASTOLIC BLOOD PRESSURE: 64 MMHG | BODY MASS INDEX: 22.73 KG/M2 | SYSTOLIC BLOOD PRESSURE: 112 MMHG | HEIGHT: 66 IN | HEART RATE: 95 BPM | WEIGHT: 141.4 LBS | OXYGEN SATURATION: 98 %

## 2019-10-11 DIAGNOSIS — N52.9 ERECTILE DYSFUNCTION, UNSPECIFIED ERECTILE DYSFUNCTION TYPE: ICD-10-CM

## 2019-10-11 DIAGNOSIS — Z12.5 SCREENING FOR PROSTATE CANCER: ICD-10-CM

## 2019-10-11 DIAGNOSIS — Z12.11 COLON CANCER SCREENING: ICD-10-CM

## 2019-10-11 DIAGNOSIS — Z13.1 SCREENING FOR DIABETES MELLITUS: ICD-10-CM

## 2019-10-11 DIAGNOSIS — Z23 NEED FOR VACCINATION: Primary | ICD-10-CM

## 2019-10-11 DIAGNOSIS — Z13.220 SCREENING, LIPID: ICD-10-CM

## 2019-10-11 PROCEDURE — 3017F COLORECTAL CA SCREEN DOC REV: CPT | Performed by: PHYSICIAN ASSISTANT

## 2019-10-11 PROCEDURE — 99214 OFFICE O/P EST MOD 30 MIN: CPT | Performed by: PHYSICIAN ASSISTANT

## 2019-10-11 PROCEDURE — 90471 IMMUNIZATION ADMIN: CPT | Performed by: PHYSICIAN ASSISTANT

## 2019-10-11 PROCEDURE — G8420 CALC BMI NORM PARAMETERS: HCPCS | Performed by: PHYSICIAN ASSISTANT

## 2019-10-11 PROCEDURE — 4004F PT TOBACCO SCREEN RCVD TLK: CPT | Performed by: PHYSICIAN ASSISTANT

## 2019-10-11 PROCEDURE — G8482 FLU IMMUNIZE ORDER/ADMIN: HCPCS | Performed by: PHYSICIAN ASSISTANT

## 2019-10-11 PROCEDURE — 90686 IIV4 VACC NO PRSV 0.5 ML IM: CPT | Performed by: PHYSICIAN ASSISTANT

## 2019-10-11 PROCEDURE — G8427 DOCREV CUR MEDS BY ELIG CLIN: HCPCS | Performed by: PHYSICIAN ASSISTANT

## 2019-10-11 RX ORDER — VITAMIN B COMPLEX
1 CAPSULE ORAL DAILY
COMMUNITY

## 2019-10-11 RX ORDER — SILDENAFIL CITRATE 20 MG/1
TABLET ORAL
Qty: 60 TABLET | Refills: 3 | Status: SHIPPED | OUTPATIENT
Start: 2019-10-11 | End: 2020-11-09

## 2019-10-11 ASSESSMENT — ENCOUNTER SYMPTOMS
CONSTIPATION: 0
SHORTNESS OF BREATH: 0
EYE PAIN: 0
CHEST TIGHTNESS: 0
BACK PAIN: 0
COUGH: 0
DIARRHEA: 0
ABDOMINAL PAIN: 0
SORE THROAT: 0
TROUBLE SWALLOWING: 0
VOICE CHANGE: 0

## 2019-10-11 ASSESSMENT — PATIENT HEALTH QUESTIONNAIRE - PHQ9
1. LITTLE INTEREST OR PLEASURE IN DOING THINGS: 0
SUM OF ALL RESPONSES TO PHQ QUESTIONS 1-9: 0
2. FEELING DOWN, DEPRESSED OR HOPELESS: 0
SUM OF ALL RESPONSES TO PHQ9 QUESTIONS 1 & 2: 0
SUM OF ALL RESPONSES TO PHQ QUESTIONS 1-9: 0

## 2020-10-29 RX ORDER — SILDENAFIL CITRATE 20 MG/1
TABLET ORAL
Qty: 60 TABLET | Refills: 3 | OUTPATIENT
Start: 2020-10-29

## 2020-10-29 NOTE — TELEPHONE ENCOUNTER
Medication:   Requested Prescriptions     Pending Prescriptions Disp Refills    sildenafil (REVATIO) 20 MG tablet 60 tablet 3     Sig: Take 1-3 tabs po daily prn      Last Filled:      Patient Phone Number: [phone] (home) [phone] (work)    Last appt: 10/11/2019   Next appt: Visit date not found    Last OARRS: No flowsheet data found. PDMP Monitoring:    Last PDMP Maria Del Carmen Bliss as Reviewed [facility]):  Review User Review Instant Review Result          Preferred Pharmacy:   [facility]/pharmacy #[id number]. - P [phone] - F [phone]  [address] [address]  Phone: [phone] Fax: [phone]

## 2020-11-06 NOTE — TELEPHONE ENCOUNTER
Patient is calling about his refill.  It states it was rejected but I didn't see any other notes    Please advise and give patient a callback   [phone]

## 2020-11-09 RX ORDER — SILDENAFIL CITRATE 20 MG/1
TABLET ORAL
Qty: 60 TABLET | Refills: 3 | Status: SHIPPED | OUTPATIENT
Start: 2020-11-09 | End: 2021-09-16

## 2020-11-18 ENCOUNTER — OFFICE VISIT (OUTPATIENT)
Dept: FAMILY MEDICINE CLINIC | Age: 60
End: 2020-11-18
Payer: COMMERCIAL

## 2020-11-18 VITALS
TEMPERATURE: 97.2 F | SYSTOLIC BLOOD PRESSURE: 120 MMHG | BODY MASS INDEX: 22.78 KG/M2 | HEART RATE: 82 BPM | WEIGHT: 139 LBS | DIASTOLIC BLOOD PRESSURE: 72 MMHG

## 2020-11-18 PROCEDURE — 99396 PREV VISIT EST AGE 40-64: CPT | Performed by: PHYSICIAN ASSISTANT

## 2020-11-18 PROCEDURE — G8482 FLU IMMUNIZE ORDER/ADMIN: HCPCS | Performed by: PHYSICIAN ASSISTANT

## 2020-11-18 PROCEDURE — 90686 IIV4 VACC NO PRSV 0.5 ML IM: CPT | Performed by: PHYSICIAN ASSISTANT

## 2020-11-18 PROCEDURE — 90471 IMMUNIZATION ADMIN: CPT | Performed by: PHYSICIAN ASSISTANT

## 2020-11-18 RX ORDER — ZINC GLUCONATE 50 MG
50 TABLET ORAL DAILY
COMMUNITY
End: 2022-03-25 | Stop reason: ALTCHOICE

## 2020-11-18 RX ORDER — ZOSTER VACCINE RECOMBINANT, ADJUVANTED 50 MCG/0.5
0.5 KIT INTRAMUSCULAR SEE ADMIN INSTRUCTIONS
Qty: 0.5 ML | Refills: 0 | Status: SHIPPED | OUTPATIENT
Start: 2020-11-18 | End: 2021-05-17

## 2020-11-18 ASSESSMENT — ENCOUNTER SYMPTOMS
CHEST TIGHTNESS: 0
BACK PAIN: 0
SHORTNESS OF BREATH: 0
EYE PAIN: 0
ABDOMINAL PAIN: 0
DIARRHEA: 0
CONSTIPATION: 0
VOICE CHANGE: 0
COUGH: 0
TROUBLE SWALLOWING: 0
SORE THROAT: 0

## 2020-11-18 NOTE — PROGRESS NOTES
Subjective:      Patient ID: Kash Madsen is a 61 y.o. male. HPI Patient is here today for his annual exam. He has no complaints today. He is due for routine labs. He is overdue for colonoscopy by 2 years. He would like a flu shot and Shingrix. Tdap current. He is current with dental exams. He has not been to an eye doctor in years. He wears \"readers\" from [employer]. He is sleeping well, good appetite, pretty healthy diet. His job is very physical. He has no bowel/bladder issues. He takes Sildenafil but usually has to take 5 of them at a time for it to even work. Review of Systems   Constitutional: Negative for appetite change, fatigue and unexpected weight change. HENT: Negative for congestion, dental problem, ear pain, hearing loss, sore throat, trouble swallowing and voice change. Eyes: Negative for pain and visual disturbance. Respiratory: Negative for cough, chest tightness and shortness of breath. Cardiovascular: Negative for chest pain and palpitations. Gastrointestinal: Negative for abdominal pain, constipation and diarrhea. Genitourinary: Negative for difficulty urinating. Musculoskeletal: Negative for arthralgias, back pain, myalgias and neck pain. Skin: Negative for rash. Neurological: Negative for dizziness, speech difficulty, weakness, numbness and headaches. Hematological: Negative for adenopathy. Psychiatric/Behavioral: Negative for confusion and sleep disturbance. The patient is not nervous/anxious. Objective:   Physical Exam  Vitals signs reviewed. Constitutional:       Appearance: Normal appearance. He is well-developed. HENT:      Head: Normocephalic. Right Ear: Hearing, tympanic membrane and ear canal normal.      Left Ear: Hearing, tympanic membrane and ear canal normal.      Nose: Nose normal.      Mouth/Throat:      Pharynx: Uvula midline.    Eyes:      Conjunctiva/sclera: Conjunctivae normal.      Pupils: Pupils are equal, round, and reactive to light. Neck:      Musculoskeletal: Normal range of motion and neck supple. No muscular tenderness. Thyroid: No thyroid mass or thyromegaly. Vascular: No carotid bruit. Trachea: Trachea normal.   Cardiovascular:      Rate and Rhythm: Normal rate and regular rhythm. Chest Wall: PMI is not displaced. Pulses: Normal pulses. Heart sounds: Normal heart sounds. Abdominal:      General: Bowel sounds are normal.      Palpations: Abdomen is soft. Tenderness: There is no abdominal tenderness. There is no guarding or rebound. Hernia: No hernia is present. Musculoskeletal:      Cervical back: Normal.      Thoracic back: Normal.      Lumbar back: Normal.   Lymphadenopathy:      Cervical: No cervical adenopathy. Skin:     General: Skin is warm and dry. Findings: No rash. Neurological:      Mental Status: He is alert and oriented to person, place, and time. Cranial Nerves: No cranial nerve deficit. Sensory: No sensory deficit. Gait: Gait normal.   Psychiatric:         Attention and Perception: Attention normal.         Mood and Affect: Mood normal.         Speech: Speech normal.         Behavior: Behavior normal. Behavior is cooperative. Assessment:      Toma Michel was seen today for annual exam.    Diagnoses and all orders for this visit:    Preventative health care    Need for influenza vaccination  -     INFLUENZA, QUADV, 3 YRS AND OLDER, IM PF, PREFILL SYR OR SDV, 0.5ML (AFLURIA QUADV, PF)    Screening for colon cancer  -     COLONOSCOPY (Screening)    Need for shingles vaccine  -     zoster recombinant adjuvanted vaccine [facility]) 50 MCG/0.5ML SUSR injection; Inject 0.5 mLs into the muscle See Admin Instructions 1 dose now and repeat in 2-6 months    Screening, lipid  -     LIPID PANEL;  Future    Screening for diabetes mellitus  -     Comprehensive Metabolic Panel    Screening for prostate cancer  -     Psa screening    Erectile

## 2020-11-18 NOTE — PATIENT INSTRUCTIONS
Nic Branham was seen today for annual exam.    Diagnoses and all orders for this visit:    Preventative health care    Need for influenza vaccination  -     INFLUENZA, QUADV, 3 YRS AND OLDER, IM PF, PREFILL SYR OR SDV, 0.5ML (AFLURIA QUADV, PF)    Screening for colon cancer  -     COLONOSCOPY (Screening)    Need for shingles vaccine  -     zoster recombinant adjuvanted vaccine (SHINGRIX) 50 MCG/0.5ML SUSR injection; Inject 0.5 mLs into the muscle See Admin Instructions 1 dose now and repeat in 2-6 months    Screening, lipid  -     LIPID PANEL; Future    Screening for diabetes mellitus  -     Comprehensive Metabolic Panel    Screening for prostate cancer  -     Psa screening    Erectile dysfunction, unspecified erectile dysfunction type       Get colonoscopy, shingrix and routine labs, return here in a year.

## 2020-11-18 NOTE — PROGRESS NOTES
Vaccine Information Sheet, \"Influenza - Inactivated\"  given to Shila Tovar, or parent/legal guardian of  Shila Tovar and verbalized understanding. Patient responses:    Have you ever had a reaction to a flu vaccine? No  Do you have any current illness? No  Have you ever had Guillian Gary Syndrome? No  Do you have a serious allergy to any of the follow: Neomycin, Polymyxin, Thimerosal, eggs or egg products? No    Flu vaccine given per order. Please see immunization tab. Risks and benefits explained. Current VIS given.

## 2021-03-30 ENCOUNTER — OFFICE VISIT (OUTPATIENT)
Dept: FAMILY MEDICINE CLINIC | Age: 61
End: 2021-03-30
Payer: COMMERCIAL

## 2021-03-30 VITALS
DIASTOLIC BLOOD PRESSURE: 70 MMHG | OXYGEN SATURATION: 98 % | WEIGHT: 142 LBS | BODY MASS INDEX: 23.27 KG/M2 | HEART RATE: 82 BPM | TEMPERATURE: 97.5 F | SYSTOLIC BLOOD PRESSURE: 124 MMHG

## 2021-03-30 DIAGNOSIS — Z12.11 SCREEN FOR COLON CANCER: ICD-10-CM

## 2021-03-30 DIAGNOSIS — H61.21 IMPACTED CERUMEN OF RIGHT EAR: ICD-10-CM

## 2021-03-30 DIAGNOSIS — Z13.220 SCREENING, LIPID: Primary | ICD-10-CM

## 2021-03-30 DIAGNOSIS — Z13.1 SCREENING FOR DIABETES MELLITUS: ICD-10-CM

## 2021-03-30 DIAGNOSIS — Z23 NEED FOR SHINGLES VACCINE: ICD-10-CM

## 2021-03-30 DIAGNOSIS — Z12.5 SCREENING FOR PROSTATE CANCER: ICD-10-CM

## 2021-03-30 PROCEDURE — G8427 DOCREV CUR MEDS BY ELIG CLIN: HCPCS | Performed by: PHYSICIAN ASSISTANT

## 2021-03-30 PROCEDURE — 3017F COLORECTAL CA SCREEN DOC REV: CPT | Performed by: PHYSICIAN ASSISTANT

## 2021-03-30 PROCEDURE — G8482 FLU IMMUNIZE ORDER/ADMIN: HCPCS | Performed by: PHYSICIAN ASSISTANT

## 2021-03-30 PROCEDURE — 4004F PT TOBACCO SCREEN RCVD TLK: CPT | Performed by: PHYSICIAN ASSISTANT

## 2021-03-30 PROCEDURE — 99213 OFFICE O/P EST LOW 20 MIN: CPT | Performed by: PHYSICIAN ASSISTANT

## 2021-03-30 PROCEDURE — G8420 CALC BMI NORM PARAMETERS: HCPCS | Performed by: PHYSICIAN ASSISTANT

## 2021-03-30 RX ORDER — ZOSTER VACCINE RECOMBINANT, ADJUVANTED 50 MCG/0.5
0.5 KIT INTRAMUSCULAR SEE ADMIN INSTRUCTIONS
Qty: 0.5 ML | Refills: 0 | Status: SHIPPED | OUTPATIENT
Start: 2021-03-30 | End: 2021-09-26

## 2021-03-30 ASSESSMENT — ENCOUNTER SYMPTOMS
COUGH: 0
SORE THROAT: 0
WHEEZING: 0
EYE PAIN: 0

## 2021-03-30 ASSESSMENT — PATIENT HEALTH QUESTIONNAIRE - PHQ9
SUM OF ALL RESPONSES TO PHQ QUESTIONS 1-9: 0
2. FEELING DOWN, DEPRESSED OR HOPELESS: 0
1. LITTLE INTEREST OR PLEASURE IN DOING THINGS: 0
SUM OF ALL RESPONSES TO PHQ9 QUESTIONS 1 & 2: 0

## 2021-03-30 NOTE — PROGRESS NOTES
Subjective:      Patient ID: Billy Sánchez is a 61 y.o. male. HPI Patient is here today due to muffled hearing in right ear for 1-2 weeks. He has no pain in his ear. No URI symptoms. He has had to get them irrigated in the past.     Also needs new orders for labs, colonoscopy and shingrix that he never did in November. Review of Systems   Constitutional: Negative for chills, fatigue and fever. HENT: Positive for hearing loss and tinnitus (has had this for years). Negative for congestion, ear pain and sore throat. Eyes: Negative for pain. Respiratory: Negative for cough and wheezing. Skin: Negative for rash. Neurological: Negative for dizziness and headaches. Psychiatric/Behavioral: Negative for sleep disturbance. Objective:   Physical Exam  Vitals signs reviewed. Constitutional:       Appearance: Normal appearance. He is well-developed and well-groomed. HENT:      Head: Normocephalic. Left Ear: Tympanic membrane and ear canal normal.      Ears:      Comments: Little wax in left ear but nothing blocking TM, right TM is blocked with cerumen   Neurological:      Mental Status: He is alert and oriented to person, place, and time. Psychiatric:         Attention and Perception: Attention normal.         Mood and Affect: Mood normal.         Speech: Speech normal.         Behavior: Behavior normal. Behavior is cooperative. Assessment:      Evan Bryan was seen today for other. Diagnoses and all orders for this visit:    Screening, lipid  -     LIPID PANEL; Future    Screening for diabetes mellitus  -     Comprehensive Metabolic Panel    Screening for prostate cancer  -     PSA screening    Need for shingles vaccine  -     zoster recombinant adjuvanted vaccine [facility]) 50 MCG/0.5ML SUSR injection;  Inject 0.5 mLs into the muscle See Admin Instructions 1 dose now and repeat in 2-6 months    Screen for colon cancer  -     COLONOSCOPY (Screening)    Impacted cerumen of right ear             Plan:      Cleaned both ears out, get labs, colonoscopy and shingrix done, due for physical in November.         Shankar Arevalo

## 2021-03-30 NOTE — PATIENT INSTRUCTIONS
Martín Cornelius was seen today for other. Diagnoses and all orders for this visit:    Screening, lipid  -     LIPID PANEL; Future    Screening for diabetes mellitus  -     Comprehensive Metabolic Panel    Screening for prostate cancer  -     PSA screening    Need for shingles vaccine  -     zoster recombinant adjuvanted vaccine [facility]) 50 MCG/0.5ML SUSR injection;  Inject 0.5 mLs into the muscle See Admin Instructions 1 dose now and repeat in 2-6 months    Screen for colon cancer  -     COLONOSCOPY (Screening)    Impacted cerumen of right ear

## 2021-04-29 ENCOUNTER — HOSPITAL ENCOUNTER (OUTPATIENT)
Age: 61
Discharge: HOME | End: 2021-04-29
Payer: COMMERCIAL

## 2021-04-29 DIAGNOSIS — Z13.220 SCREENING, LIPID: ICD-10-CM

## 2021-04-29 LAB
A/G RATIO: 1.9 (ref 1.1–2.2)
ALBUMIN SERPL-MCNC: 4.3 G/DL (ref 3.4–5)
ALP BLD-CCNC: 57 U/L (ref 40–129)
ALT SERPL-CCNC: 13 U/L (ref 10–40)
ANION GAP SERPL CALCULATED.3IONS-SCNC: 11 MMOL/L (ref 3–16)
AST SERPL-CCNC: 17 U/L (ref 15–37)
BILIRUB SERPL-MCNC: 0.6 MG/DL (ref 0–1)
BUN BLDV-MCNC: 9 MG/DL (ref 7–20)
CALCIUM SERPL-MCNC: 8.9 MG/DL (ref 8.3–10.6)
CHLORIDE BLD-SCNC: 106 MMOL/L (ref 99–110)
CHOLESTEROL, TOTAL: 209 MG/DL (ref 0–199)
CO2: 25 MMOL/L (ref 21–32)
CREAT SERPL-MCNC: 0.8 MG/DL (ref 0.8–1.3)
GFR AFRICAN AMERICAN: >60
GFR NON-AFRICAN AMERICAN: >60
GLOBULIN: 2.3 G/DL
GLUCOSE BLD-MCNC: 88 MG/DL (ref 70–99)
HDLC SERPL-MCNC: 87 MG/DL (ref 40–60)
LDL CHOLESTEROL CALCULATED: 108 MG/DL
POTASSIUM SERPL-SCNC: 4.3 MMOL/L (ref 3.5–5.1)
PROSTATE SPECIFIC ANTIGEN: 1.44 NG/ML (ref 0–4)
SODIUM BLD-SCNC: 142 MMOL/L (ref 136–145)
TOTAL PROTEIN: 6.6 G/DL (ref 6.4–8.2)
TRIGL SERPL-MCNC: 71 MG/DL (ref 0–150)
VLDLC SERPL CALC-MCNC: 14 MG/DL

## 2021-04-29 PROCEDURE — 80061 LIPID PANEL: CPT

## 2021-04-29 PROCEDURE — 84153 ASSAY OF PSA TOTAL: CPT

## 2021-04-29 PROCEDURE — 36415 COLL VENOUS BLD VENIPUNCTURE: CPT

## 2021-04-29 PROCEDURE — 80053 COMPREHEN METABOLIC PANEL: CPT

## 2021-06-09 ENCOUNTER — OFFICE VISIT (OUTPATIENT)
Dept: ORTHOPEDIC SURGERY | Age: 61
End: 2021-06-09
Payer: COMMERCIAL

## 2021-06-09 VITALS — BODY MASS INDEX: 21.86 KG/M2 | WEIGHT: 136 LBS | RESPIRATION RATE: 16 BRPM | HEIGHT: 66 IN

## 2021-06-09 DIAGNOSIS — M67.462 GANGLION OF LEFT KNEE: ICD-10-CM

## 2021-06-09 DIAGNOSIS — M25.562 LEFT KNEE PAIN, UNSPECIFIED CHRONICITY: Primary | ICD-10-CM

## 2021-06-09 PROCEDURE — 99213 OFFICE O/P EST LOW 20 MIN: CPT | Performed by: ORTHOPAEDIC SURGERY

## 2021-06-09 PROCEDURE — 20610 DRAIN/INJ JOINT/BURSA W/O US: CPT | Performed by: ORTHOPAEDIC SURGERY

## 2021-06-09 PROCEDURE — 4004F PT TOBACCO SCREEN RCVD TLK: CPT | Performed by: ORTHOPAEDIC SURGERY

## 2021-06-09 PROCEDURE — 3017F COLORECTAL CA SCREEN DOC REV: CPT | Performed by: ORTHOPAEDIC SURGERY

## 2021-06-09 PROCEDURE — G8420 CALC BMI NORM PARAMETERS: HCPCS | Performed by: ORTHOPAEDIC SURGERY

## 2021-06-09 PROCEDURE — G8427 DOCREV CUR MEDS BY ELIG CLIN: HCPCS | Performed by: ORTHOPAEDIC SURGERY

## 2021-06-09 RX ORDER — LIDOCAINE HYDROCHLORIDE 10 MG/ML
5 INJECTION, SOLUTION INFILTRATION; PERINEURAL ONCE
Status: COMPLETED | OUTPATIENT
Start: 2021-06-09 | End: 2021-06-09

## 2021-06-09 RX ADMIN — LIDOCAINE HYDROCHLORIDE 5 ML: 10 INJECTION, SOLUTION INFILTRATION; PERINEURAL at 10:03

## 2021-06-09 NOTE — PROGRESS NOTES
History:   Procedure Laterality Date    APPENDECTOMY      CARPAL TUNNEL RELEASE Left 12/21/2017    Left  Carpal Tunnel Release & Left Ulnar Nerve decompression at the Cubital Tunnel     COLONOSCOPY      ELBOW SURGERY      left ulnar nerve entrapment    KNEE ARTHROPLASTY Right 03/28/2017    RIGHT PARTIAL KNEE ARTHROPLASTY           SHOULDER SURGERY Right       Allergies, social and family histories, and medications were reviewed and updated as appropriate. General Exam:  Vital Signs:  Resp 16   Ht 5' 6\" (1.676 m)   Wt 136 lb (61.7 kg)   BMI 21.95 kg/m²    Constitutional: Patient is adequately groomed with no evidence of malnutrition. Mental Status: The patient is oriented to time, place and person. The patient's mood and affect are appropriate. Lymphatic: The lymphatic examination bilaterally reveals all areas to be without enlargement or induration. Vascular: Examination reveals no swelling or calf tenderness. 2+ palpable pulses distally. Neurological: The patient has good coordination. There is no focal weakness or sensory deficit. Focal examination of left knee is as follows: Inspection & Skin:  Knee shows varus alignment. Normal muscle bulk and tone for patient's age and activity level. No significant effusion. There are no rashes, ulcerations or lesions. No erythema. He does have a palpable cystic region along the medial aspect of the knee near the joint line. This is compressible with minimal tenderness. Palpation: Minimal tenderness on palpation along medial joint line. No tenderness on palpation along lateral joint line. Extensor mechanism intact on palpation. Range of Motion:     Extension: 2-3° short of neutral   Flexion: 120°    Strength:     Quad 5/5.  Hamstrings 5/5.  Hip and ankle motor function are grossly intact.      Special Tests:    Does not open to valgus or varus stress at 0 or 30°   No posterior sag   Lachman's test negative   Patellar grind negative    Darin's sign negative   Posterior tibial pulse are +2/4, capillary refill is brisk, sensation in intact    Gait: Tandem gait    Radiology:     X-rays obtained today and reviewed in office:  Views 4: left knee with comparison AP, flexion PA, and skyline views. Impression: No evidence for acute fracture, subluxation, or dislocation. No lytic or blastic lesions in the metaphyseal regions. Left knee shows severe medial compartment joint space narrowing and early osteophyte formation with varus alignment consistent with similar findings on his contralateral knee prior to his right knee medial compartment arthroplasty. Comparison views show stable medial compartment arthroplasty. Office Orders/Procedures:  Orders Placed This Encounter   Procedures    XR KNEE BILATERAL STANDING     Standing Status:   Future     Number of Occurrences:   1     Standing Expiration Date:   7/9/2021    XR KNEE LEFT (3 VIEWS)     rm 3     Standing Status:   Future     Number of Occurrences:   1     Standing Expiration Date:   7/9/2021    MN ARTHROCENTESIS ASPIR&/INJ MAJOR JT/BURSA W/O US       Impression:   Diagnosis Orders   1. Left knee pain, unspecified chronicity  XR KNEE BILATERAL STANDING    XR KNEE LEFT (3 VIEWS)   2. Ganglion of left knee  lidocaine 1 % injection 5 mL    MN ARTHROCENTESIS ASPIR&/INJ MAJOR JT/BURSA W/O US        Treatment Plan:  I have discussed treatment options with the patient. After reviewing his x-rays, it shows arthritis on the medial aspect of his left knee. Just looking at his x-rays, he could be a candidate for a partial knee replacement. However, due to not having pain we will go ahead and treat his ganglion cyst symptomatically with aspiration. However, there is always the chance that it could return. This ganglion may be independent of or related to the underlying degenerative changes in his knee.   He also has the option to not proceed with any treatment but today would prefer aspiration. After discussing the risks and benefits of ganglion cyst aspiration including increased pain, infection, bleeding, lack of improvement and neurovascular injury he agreed to proceed today. Questions were encouraged and answered to the best of my ability and with patient satisfaction. The correct patient, procedure, site and side were identified. The left knee was prepped with Betadine and 3 mL 1% lidocaine plain were infiltrated into the skin overlying the ganglion under aseptic technique for local anesthesia. Using an 18-gauge needle almost to cc  thick gelatinous clear fluid was aspirated. The area was then compressed locally to try to continue to decompress the ganglion. The skin was then cleaned again with alcohol and a sterile adhesive dressing was applied followed by an Ace wrap. He tolerated this well and was instructed on post-injection care, including icing and decreased activity when necessary. I have reviewed patient's pertinent medical history, relevant laboratory and imaging studies, and past surgical history. Patient's medications have been reviewed and were discussed during the visit. Patient was advised to keep future appointments with their respective specialty care team(s). Patient had the opportunity to ask questions, all of which were answered to the best of my ability and with patient satisfaction. Patient understands and is agreeable with the care plan following today's visit. Patient is to schedule an appointment for any new or worsening symptoms. By signing my name below, Yareli Munoz, attest that this documentation has been prepared under the direction and in the presence of Sandra Cervantes MD.   Electronically Signed: Js Boyd, 6/9/21, 8:11 AM EDT. Josias Ruiz MD, personally performed the services described in this documentation. All medical record entries made by the js were at my direction and in my presence. I have reviewed the chart and discharge instructions (if applicable) and agree that the record reflects my personal performance and is accurate and complete. Yuki Olivares MD, 6/27/21, 7:34 PM EDT. Some documentation was done using voice recognition dragon software. Every effort was made to ensure accuracy; however, inadvertent unintentional computerized transcription errors may be present.

## 2021-09-16 RX ORDER — SILDENAFIL CITRATE 20 MG/1
TABLET ORAL
Qty: 60 TABLET | Refills: 3 | Status: SHIPPED | OUTPATIENT
Start: 2021-09-16 | End: 2022-03-25 | Stop reason: SDUPTHER

## 2021-09-16 NOTE — TELEPHONE ENCOUNTER
Medication:   Requested Prescriptions     Pending Prescriptions Disp Refills    sildenafil (REVATIO) 20 MG tablet [Pharmacy Med Name: SILDENAFIL 20 MG TABLET] 60 tablet 3     Sig: TAKE 1 TO 3 TABLETS BY MOUTH DAILY AS NEEDED      Last Filled:     Patient Phone Number: [phone] (home) [phone] (work)    Last appt: 3/30/2021   Next appt: Visit date not found    Last OARRS: No flowsheet data found. PDMP Monitoring:    Last PDMP Denise Flores as Reviewed [facility]):  Review User Review Instant Review Result          Preferred Pharmacy:   [facility]/pharmacy [address] [place] [address]. - P [phone] - F [phone]  [address] [address]  Phone: [phone] Fax: [phone]

## 2021-09-27 ENCOUNTER — TELEPHONE (OUTPATIENT)
Dept: ADMINISTRATIVE | Age: 61
End: 2021-09-27

## 2021-09-27 NOTE — TELEPHONE ENCOUNTER
I need a DX code to send with PA please. Key: [id number]    If this requires a response please respond to the pool ( P [facility] [address]). Thank you please advise patient.

## 2021-10-05 ENCOUNTER — TELEPHONE (OUTPATIENT)
Dept: FAMILY MEDICINE CLINIC | Age: 61
End: 2021-10-05

## 2022-03-25 ENCOUNTER — OFFICE VISIT (OUTPATIENT)
Dept: FAMILY MEDICINE CLINIC | Age: 62
End: 2022-03-25
Payer: COMMERCIAL

## 2022-03-25 VITALS
SYSTOLIC BLOOD PRESSURE: 128 MMHG | HEART RATE: 90 BPM | BODY MASS INDEX: 23.4 KG/M2 | TEMPERATURE: 97.1 F | DIASTOLIC BLOOD PRESSURE: 85 MMHG | WEIGHT: 145 LBS

## 2022-03-25 DIAGNOSIS — N52.9 ERECTILE DYSFUNCTION, UNSPECIFIED ERECTILE DYSFUNCTION TYPE: ICD-10-CM

## 2022-03-25 DIAGNOSIS — R53.82 CHRONIC FATIGUE: ICD-10-CM

## 2022-03-25 DIAGNOSIS — Z12.11 SCREEN FOR COLON CANCER: ICD-10-CM

## 2022-03-25 DIAGNOSIS — Z00.00 WELL ADULT EXAM: Primary | ICD-10-CM

## 2022-03-25 DIAGNOSIS — Z13.220 SCREENING, LIPID: ICD-10-CM

## 2022-03-25 DIAGNOSIS — Z13.1 SCREENING FOR DIABETES MELLITUS: ICD-10-CM

## 2022-03-25 DIAGNOSIS — Z12.5 SCREENING FOR PROSTATE CANCER: ICD-10-CM

## 2022-03-25 PROCEDURE — G8484 FLU IMMUNIZE NO ADMIN: HCPCS | Performed by: PHYSICIAN ASSISTANT

## 2022-03-25 PROCEDURE — 99396 PREV VISIT EST AGE 40-64: CPT | Performed by: PHYSICIAN ASSISTANT

## 2022-03-25 RX ORDER — MULTIVIT-MIN/IRON/FOLIC ACID/K 18-600-40
1 CAPSULE ORAL DAILY
COMMUNITY

## 2022-03-25 RX ORDER — SILDENAFIL CITRATE 20 MG/1
TABLET ORAL
Qty: 60 TABLET | Refills: 5 | Status: SHIPPED | OUTPATIENT
Start: 2022-03-25 | End: 2022-11-04 | Stop reason: SDUPTHER

## 2022-03-25 ASSESSMENT — ENCOUNTER SYMPTOMS
CONSTIPATION: 0
ABDOMINAL PAIN: 0
DIARRHEA: 0
SORE THROAT: 0
COUGH: 0
TROUBLE SWALLOWING: 0
CHEST TIGHTNESS: 0
BACK PAIN: 0
VOICE CHANGE: 0
EYE PAIN: 0
SHORTNESS OF BREATH: 0

## 2022-03-25 NOTE — PROGRESS NOTES
Subjective:      Patient ID: Cydne Credit is a 64 y.o. male. HPI Patient is here today for his yearly exam.     He really has no complaints other than being tired all the time but says that all the time. He still works full time and is totally fine with that but just gets tired. He is sleeping well, good appetite. He is very active with his job but no other exercise. He has no bowel/bladder issues. He had 3 Covid vaccines. Tdap is current. He is interested in getting Shingrix but it was really expensive last year when he checked on it. He takes Sildenafil prn. He usually takes 3-4 and it works just fine. No SE's. He gets routine dental visits. He has not been to the eye doctor in a long time. He is overdue for colonoscopy. Review of Systems   Constitutional: Positive for fatigue. Negative for appetite change and unexpected weight change. HENT: Negative for congestion, dental problem, ear pain, hearing loss, sore throat, trouble swallowing and voice change. Eyes: Negative for pain and visual disturbance. Respiratory: Negative for cough, chest tightness and shortness of breath. Cardiovascular: Negative for chest pain and palpitations. Gastrointestinal: Negative for abdominal pain, constipation and diarrhea. Genitourinary: Negative for difficulty urinating. Musculoskeletal: Negative for arthralgias, back pain, myalgias and neck pain. Skin: Negative for rash. Neurological: Negative for dizziness, speech difficulty, weakness, numbness and headaches. Hematological: Negative for adenopathy. Psychiatric/Behavioral: Negative for confusion and sleep disturbance. The patient is not nervous/anxious. Objective:   Physical Exam  Vitals reviewed. Constitutional:       Appearance: Normal appearance. He is well-developed and well-groomed. HENT:      Head: Normocephalic.       Right Ear: Tympanic membrane and ear canal normal.      Left Ear: Tympanic membrane and ear canal normal.      Nose: Nose normal.      Mouth/Throat:      Pharynx: Oropharynx is clear. Uvula midline. Eyes:      Conjunctiva/sclera: Conjunctivae normal.      Pupils: Pupils are equal, round, and reactive to light. Neck:      Thyroid: No thyroid mass or thyromegaly. Trachea: Trachea normal.   Cardiovascular:      Rate and Rhythm: Normal rate and regular rhythm. Chest Wall: PMI is not displaced. Pulses: Normal pulses. Heart sounds: Normal heart sounds. Pulmonary:      Effort: Pulmonary effort is normal.      Breath sounds: Normal breath sounds. Abdominal:      General: Abdomen is flat. Bowel sounds are normal.      Palpations: Abdomen is soft. Tenderness: There is no abdominal tenderness. There is no guarding or rebound. Hernia: No hernia is present. Musculoskeletal:      Cervical back: Normal range of motion and neck supple. No muscular tenderness. Thoracic back: Normal.      Lumbar back: Normal.      Comments: Full ROM and strength of torso and extremities, gait normal   Lymphadenopathy:      Cervical: No cervical adenopathy. Skin:     General: Skin is warm and dry. Findings: No rash. Neurological:      Mental Status: He is alert and oriented to person, place, and time. Cranial Nerves: No cranial nerve deficit. Sensory: No sensory deficit. Gait: Gait normal.   Psychiatric:         Attention and Perception: Attention normal.         Mood and Affect: Mood normal.         Speech: Speech normal.         Behavior: Behavior normal. Behavior is cooperative. Assessment:      Siva Daniel was seen today for annual exam.    Diagnoses and all orders for this visit:    Well adult exam    Erectile dysfunction, unspecified erectile dysfunction type  -     sildenafil (REVATIO) 20 MG tablet; TAKE 1 TO 3 TABLETS BY MOUTH DAILY AS NEEDED    Chronic fatigue  -     CBC with Auto Differential  -     TSH;  Future  -     Vitamin D 25 Hydroxy  -     Vitamin B12 & Folate; Future  -     Testosterone, free, total; Future    Screening, lipid  -     LIPID PANEL; Future    Screening for diabetes mellitus  -     Comprehensive Metabolic Panel    Screening for prostate cancer  -     PSA, Prostatic Specific Antigen; Future    Screen for colon cancer  -     COLONOSCOPY (Screening); Future             Plan:      Get routine labs early May, get colonoscopy, eye exam, return here in a year or sooner if needed.          Sonia Morris [place]

## 2022-03-25 NOTE — PATIENT INSTRUCTIONS
Rani Casillas was seen today for annual exam.    Diagnoses and all orders for this visit:    Well adult exam    Erectile dysfunction, unspecified erectile dysfunction type  -     sildenafil (REVATIO) 20 MG tablet; TAKE 1 TO 3 TABLETS BY MOUTH DAILY AS NEEDED    Chronic fatigue  -     CBC with Auto Differential  -     TSH; Future  -     Vitamin D 25 Hydroxy  -     Vitamin B12 & Folate; Future  -     Testosterone, free, total; Future    Screening, lipid  -     LIPID PANEL; Future    Screening for diabetes mellitus  -     Comprehensive Metabolic Panel    Screening for prostate cancer  -     PSA, Prostatic Specific Antigen; Future    Screen for colon cancer  -     COLONOSCOPY (Screening); Future       Get routine labs in early May, get colonoscopy, return here in a year or sooner if needed.  Get eye exam.

## 2022-08-16 ENCOUNTER — APPOINTMENT (OUTPATIENT)
Dept: GENERAL RADIOLOGY | Age: 62
End: 2022-08-16
Payer: COMMERCIAL

## 2022-08-16 ENCOUNTER — HOSPITAL ENCOUNTER (EMERGENCY)
Age: 62
Discharge: HOME | End: 2022-08-16
Payer: COMMERCIAL

## 2022-08-16 VITALS
RESPIRATION RATE: 18 BRPM | TEMPERATURE: 98.4 F | HEART RATE: 76 BPM | BODY MASS INDEX: 23.4 KG/M2 | SYSTOLIC BLOOD PRESSURE: 137 MMHG | DIASTOLIC BLOOD PRESSURE: 82 MMHG | WEIGHT: 145 LBS | OXYGEN SATURATION: 96 %

## 2022-08-16 DIAGNOSIS — S61.220A LACERATION OF RIGHT INDEX FINGER WITH FOREIGN BODY WITHOUT DAMAGE TO NAIL, INITIAL ENCOUNTER: Primary | ICD-10-CM

## 2022-08-16 DIAGNOSIS — S61.421A LACERATION OF RIGHT HAND WITH FOREIGN BODY, INITIAL ENCOUNTER: ICD-10-CM

## 2022-08-16 PROCEDURE — 99283 EMERGENCY DEPT VISIT LOW MDM: CPT

## 2022-08-16 PROCEDURE — 73130 X-RAY EXAM OF HAND: CPT

## 2022-08-16 RX ORDER — CEPHALEXIN 500 MG/1
500 CAPSULE ORAL 4 TIMES DAILY
Qty: 28 CAPSULE | Refills: 0 | Status: SHIPPED | OUTPATIENT
Start: 2022-08-16 | End: 2022-08-23

## 2022-08-16 RX ORDER — IBUPROFEN 400 MG/1
400 TABLET ORAL EVERY 6 HOURS PRN
Qty: 20 TABLET | Refills: 0 | Status: SHIPPED | OUTPATIENT
Start: 2022-08-16

## 2022-08-16 ASSESSMENT — ENCOUNTER SYMPTOMS
COLOR CHANGE: 0
SHORTNESS OF BREATH: 0
BACK PAIN: 0
NAUSEA: 0

## 2022-08-16 ASSESSMENT — PAIN - FUNCTIONAL ASSESSMENT: PAIN_FUNCTIONAL_ASSESSMENT: NONE - DENIES PAIN

## 2022-08-16 NOTE — ED PROVIDER NOTES
[facility]        Pt Name: Davy Arguello  MRN: [id number]  [place] 1960  Date of evaluation: 8/16/2022  Provider: Nancy Kenney PA-C  PCP: SCOTT Mcdonald  Note Started: 1:05 PM EDT       GOLDIE. I have evaluated this patient. My supervising physician was available for consultation. CHIEF COMPLAINT       Chief Complaint   Patient presents with    Laceration     To R hand from  at work        HISTORY OF PRESENT ILLNESS   (Location, Timing/Onset, Context/Setting, Quality, Duration, Modifying Factors, Severity, Associated Signs and Symptoms)  Note limiting factors. Chief Complaint: Right hand and finger laceration    Davy Arguello is a 64 y.o. male who presents to the emergency department complaining of right finger and hand laceration status post injury which occurred at work this morning. He accidentally cut his right hand and finger on the  machine. He denies numbness, tingling or weakness. He rates his pain to be a 5 out of 10 on pain scale without radiation of symptoms. His tetanus is up to date. Nursing Notes were all reviewed and agreed with or any disagreements were addressed in the HPI. REVIEW OF SYSTEMS    (2-9 systems for level 4, 10 or more for level 5)     Review of Systems   Constitutional:  Negative for chills and fever. HENT: Negative. Eyes:  Negative for visual disturbance. Respiratory:  Negative for shortness of breath. Cardiovascular:  Negative for chest pain. Gastrointestinal:  Negative for nausea. Musculoskeletal:  Positive for myalgias. Negative for back pain, neck pain and neck stiffness. Skin:  Positive for wound. Negative for color change, pallor and rash. Neurological:  Negative for dizziness, tremors, seizures, syncope, facial asymmetry, speech difficulty, weakness, light-headedness, numbness and headaches.    All other systems reviewed and are negative. Positives and Pertinent negatives as per HPI. Except as noted above in the ROS, all other systems were reviewed and negative. PAST MEDICAL HISTORY   History reviewed. No pertinent past medical history. SURGICAL HISTORY     Past Surgical History:   Procedure Laterality Date    APPENDECTOMY      CARPAL TUNNEL RELEASE Left 12/21/2017    Left  Carpal Tunnel Release & Left Ulnar Nerve decompression at the Cubital Tunnel     COLONOSCOPY      ELBOW SURGERY      left ulnar nerve entrapment    KNEE ARTHROPLASTY Right 03/28/2017    RIGHT PARTIAL KNEE ARTHROPLASTY           SHOULDER SURGERY Right          CURRENTMEDICATIONS       Discharge Medication List as of 8/16/2022 12:53 PM        CONTINUE these medications which have NOT CHANGED    Details   Cholecalciferol (VITAMIN D) 50 MCG (2000 UT) CAPS capsule Take 1 capsule by mouth dailyHistorical Med      sildenafil (REVATIO) 20 MG tablet TAKE 1 TO 3 TABLETS BY MOUTH DAILY AS NEEDED, Disp-60 tablet, R-5Normal      b complex vitamins capsule Take 1 capsule by mouth dailyHistorical Med      Garlic 2813 MG TABS Take by mouthHistorical Med      Multiple Vitamins-Minerals (MULTIVITAMIN ADULT PO) Take by mouth               ALLERGIES     Patient has no known allergies.     FAMILYHISTORY       Family History   Problem Relation Age of Onset    Heart Disease Brother 64    Hypertension Father     Stroke Father     Hypertension Brother     Alcohol Abuse Brother     Other Brother         Factor V Leiden    Other Mother         dementia          SOCIAL HISTORY       Social History     Tobacco Use    Smoking status: Some Days     Packs/day: 1.00     Years: 20.00     Pack years: 20.00     Types: Cigars, Cigarettes    Smokeless tobacco: Never    Tobacco comments:     one cigar daily    Substance Use Topics    Alcohol use: Yes     Comment: heavy, 5/day beer    Drug use: No       SCREENINGS             PHYSICAL EXAM    (up to 7 for level 4, 8 or more for level 5)     ED Triage Vitals [08/16/22 1125]   BP Temp Temp Source Heart Rate Resp SpO2 Height Weight   137/82 98.4 °F (36.9 °C) Oral 76 18 96 % -- 145 lb (65.8 kg)       Physical Exam  Vitals and nursing note reviewed. Constitutional:       Appearance: Normal appearance. He is well-developed. He is not toxic-appearing or diaphoretic. HENT:      Head: Normocephalic and atraumatic. Right Ear: External ear normal.      Left Ear: External ear normal.      Nose: Nose normal.      Mouth/Throat:      Mouth: Mucous membranes are moist.      Pharynx: Oropharynx is clear. Eyes:      General: No scleral icterus. Right eye: No discharge. Left eye: No discharge. Extraocular Movements: Extraocular movements intact. Conjunctiva/sclera: Conjunctivae normal.      Pupils: Pupils are equal, round, and reactive to light. Cardiovascular:      Rate and Rhythm: Normal rate. Pulses:           Radial pulses are 2+ on the right side and 2+ on the left side. Pulmonary:      Effort: Pulmonary effort is normal.      Breath sounds: Normal breath sounds. Abdominal:      General: Bowel sounds are normal.      Palpations: Abdomen is soft. Tenderness: There is no abdominal tenderness. Musculoskeletal:         General: Normal range of motion. Right shoulder: Normal.      Left shoulder: Normal.      Right upper arm: Normal.      Left upper arm: Normal.      Right elbow: Normal.      Left elbow: Normal.      Right forearm: Normal.      Left forearm: Normal.      Right wrist: Normal.      Left wrist: Normal.      Right hand: Laceration and tenderness present. No swelling, deformity or bony tenderness. Normal range of motion. Normal strength. Normal sensation. There is no disruption of two-point discrimination. Normal capillary refill. Normal pulse. Left hand: Normal.        Hands:       Cervical back: Normal range of motion. Skin:     General: Skin is warm and dry.       Capillary Refill: Capillary throughout the index finger. No additional radiopaque foreign body is identified. 1. No acute osseous abnormality of the right hand. 2. 3 mm retained foreign body within the soft tissues dorsal to the head of the 2nd metacarpal. 3. Right index finger soft tissue swelling. PROCEDURES   Unless otherwise noted below, none     Lac Repair    Date/Time: 8/16/2022 11:40 AM  Performed by: Elvira Valera PA-C  Authorized by: Elvira Valera PA-C     Consent:     Consent obtained:  Verbal    Consent given by:  Patient    Risks, benefits, and alternatives were discussed: yes      Risks discussed:  Pain, retained foreign body and infection  Universal protocol:     Patient identity confirmed:  Verbally with patient  Anesthesia:     Anesthesia method:  Local infiltration    Local anesthetic:  Lidocaine 1% w/o epi  Laceration details:     Location: right hand and index finger.     Length (cm):  5 (3 and 2)    Depth (mm):  2  Pre-procedure details:     Preparation:  Patient was prepped and draped in usual sterile fashion and imaging obtained to evaluate for foreign bodies  Exploration:     Hemostasis achieved with:  Direct pressure    Imaging obtained: x-ray      Imaging outcome: foreign body noted      Wound exploration: wound explored through full range of motion and entire depth of wound visualized      Wound extent: foreign bodies/material      Wound extent: no muscle damage noted, no nerve damage noted, no tendon damage noted, no underlying fracture noted and no vascular damage noted      Foreign bodies/material:  1 small metallic foreign body  Treatment:     Area cleansed with:  Saline and chlorhexidine    Amount of cleaning:  Extensive    Irrigation solution:  Sterile saline    Irrigation volume:  1 L    Irrigation method:  Tap    Visualized foreign bodies/material removed: yes      Debridement:  None    Undermining:  None    Scar revision: no    Skin repair:     Repair method:  Sutures    Suture size:  5-0    Suture material:  Nylon    Suture technique:  Simple interrupted    Number of sutures:  10 (5 and 5)  Approximation:     Approximation:  Close  Repair type:     Repair type:  Simple  Post-procedure details:     Dressing:  Non-adherent dressing and sterile dressing    Procedure completion:  Tolerated    CRITICAL CARE TIME   N/a    CONSULTS:  None      EMERGENCY DEPARTMENT COURSE and DIFFERENTIAL DIAGNOSIS/MDM:   Vitals:    Vitals:    08/16/22 1125   BP: 137/82   Pulse: 76   Resp: 18   Temp: 98.4 °F (36.9 °C)   TempSrc: Oral   SpO2: 96%   Weight: 145 lb (65.8 kg)       Patient was given the following medications:  Medications - No data to display      Is this patient to be included in the SEP-1 Core Measure due to severe sepsis or septic shock? No   Exclusion criteria - the patient is NOT to be included for SEP-1 Core Measure due to: Infection is not suspected    This patient presents with lacerations to right index finger and right hand. He tolerated laceration repair well without immediate complications or emesis. X-ray shows no acute osseous abnormality but does reveal the small metallic foreign body adjacent to the right hand laceration. This was irrigated thoroughly and foreign body removed. Motor and sensory function are preserved. He is neurovascular intact. Tetanus already up-to-date. Will be sent home with antibiotic to prevent secondary infection. Is advised to follow-up with [facility] for this work-related injury.   He states that he does not have a PCP currently and is requesting referral.  My suspicion is low for subungual hematoma, paronychia, eponychia, felon, flexor tenosynovitis,ACS, PE, thoracic aortic dissection, thoracic outlet obstruction, SVC syndrome,  tendon rupture, compartment syndrome, acute fracture, dislocation, DVT, arterial compromise or occlusion, limb ischemia, gout, septic joint, abscess, cellulitis, osteomyelitis, gonococcal arthritis, SCFE, avascular necrosis, Osgood-Schlatter syndrome, or other concerning pathology. FINAL IMPRESSION      1. Laceration of right index finger with foreign body without damage to nail, initial encounter    2. Laceration of right hand with foreign body, initial encounter          DISPOSITION/PLAN   DISPOSITION Decision To Discharge 08/16/2022 12:51:07 PM      PATIENT REFERRED TO:  [facility] Emergency Department  [place] [address]  [phone]    If symptoms worsen    *[address]   [facility]  [phone]          DISCHARGE MEDICATIONS:  Discharge Medication List as of 8/16/2022 12:53 PM        START taking these medications    Details   cephALEXin (KEFLEX) 500 MG capsule Take 1 capsule by mouth in the morning and 1 capsule at noon and 1 capsule in the evening and 1 capsule before bedtime. Do all this for 7 days. , Disp-28 capsule, R-0Normal             DISCONTINUED MEDICATIONS:  Discharge Medication List as of 8/16/2022 12:53 PM                 (Please note that portions of this note were completed with a voice recognition program.  Efforts were made to edit the dictations but occasionally words are mis-transcribed.)    Yari Dawkins PA-C (electronically signed)           Yari Dawkins PA-C  08/16/22 [id number]

## 2022-08-16 NOTE — ED NOTES
Pt Discharged in stable condition, VSS, no signs of distress, discharge instructions and meds reviewed. Pt verbalizes understanding and states no further questions or concerns unaddressed.          Virgilio Clemente RN  08/16/22 [id number]

## 2022-11-02 ENCOUNTER — HOSPITAL ENCOUNTER (OUTPATIENT)
Age: 62
Discharge: HOME | End: 2022-11-02
Payer: COMMERCIAL

## 2022-11-02 DIAGNOSIS — Z12.5 SCREENING FOR PROSTATE CANCER: ICD-10-CM

## 2022-11-02 DIAGNOSIS — R53.82 CHRONIC FATIGUE: ICD-10-CM

## 2022-11-02 DIAGNOSIS — Z13.220 SCREENING, LIPID: ICD-10-CM

## 2022-11-02 LAB
A/G RATIO: 1.8 (ref 1.1–2.2)
ALBUMIN SERPL-MCNC: 4.2 G/DL (ref 3.4–5)
ALP BLD-CCNC: 70 U/L (ref 40–129)
ALT SERPL-CCNC: 16 U/L (ref 10–40)
ANION GAP SERPL CALCULATED.3IONS-SCNC: 9 MMOL/L (ref 3–16)
AST SERPL-CCNC: 18 U/L (ref 15–37)
BASOPHILS ABSOLUTE: 0 K/UL (ref 0–0.2)
BASOPHILS RELATIVE PERCENT: 0.6 %
BILIRUB SERPL-MCNC: 0.4 MG/DL (ref 0–1)
BUN BLDV-MCNC: 9 MG/DL (ref 7–20)
CALCIUM SERPL-MCNC: 9.5 MG/DL (ref 8.3–10.6)
CHLORIDE BLD-SCNC: 104 MMOL/L (ref 99–110)
CHOLESTEROL, TOTAL: 210 MG/DL (ref 0–199)
CO2: 27 MMOL/L (ref 21–32)
CREAT SERPL-MCNC: 0.8 MG/DL (ref 0.8–1.3)
EOSINOPHILS ABSOLUTE: 0.3 K/UL (ref 0–0.6)
EOSINOPHILS RELATIVE PERCENT: 4.6 %
FOLATE: >20 NG/ML (ref 4.78–24.2)
GFR SERPL CREATININE-BSD FRML MDRD: >60 ML/MIN/{1.73_M2}
GLUCOSE BLD-MCNC: 89 MG/DL (ref 70–99)
HCT VFR BLD CALC: 43 % (ref 40.5–52.5)
HDLC SERPL-MCNC: 71 MG/DL (ref 40–60)
HEMOGLOBIN: 15 G/DL (ref 13.5–17.5)
LDL CHOLESTEROL CALCULATED: 130 MG/DL
LYMPHOCYTES ABSOLUTE: 1.2 K/UL (ref 1–5.1)
LYMPHOCYTES RELATIVE PERCENT: 18.6 %
MCH RBC QN AUTO: 33.8 PG (ref 26–34)
MCHC RBC AUTO-ENTMCNC: 34.9 G/DL (ref 31–36)
MCV RBC AUTO: 97 FL (ref 80–100)
MONOCYTES ABSOLUTE: 0.7 K/UL (ref 0–1.3)
MONOCYTES RELATIVE PERCENT: 11.1 %
NEUTROPHILS ABSOLUTE: 4.1 K/UL (ref 1.7–7.7)
NEUTROPHILS RELATIVE PERCENT: 65.1 %
PDW BLD-RTO: 13.3 % (ref 12.4–15.4)
PLATELET # BLD: 246 K/UL (ref 135–450)
PMV BLD AUTO: 7.1 FL (ref 5–10.5)
POTASSIUM SERPL-SCNC: 4.9 MMOL/L (ref 3.5–5.1)
PROSTATE SPECIFIC ANTIGEN: 0.94 NG/ML (ref 0–4)
RBC # BLD: 4.44 M/UL (ref 4.2–5.9)
SODIUM BLD-SCNC: 140 MMOL/L (ref 136–145)
TOTAL PROTEIN: 6.5 G/DL (ref 6.4–8.2)
TRIGL SERPL-MCNC: 43 MG/DL (ref 0–150)
TSH SERPL DL<=0.05 MIU/L-ACNC: 2.08 UIU/ML (ref 0.27–4.2)
VITAMIN B-12: 853 PG/ML (ref 211–911)
VITAMIN D 25-HYDROXY: 77.2 NG/ML
VLDLC SERPL CALC-MCNC: 9 MG/DL
WBC # BLD: 6.3 K/UL (ref 4–11)

## 2022-11-02 PROCEDURE — 84153 ASSAY OF PSA TOTAL: CPT

## 2022-11-02 PROCEDURE — 80061 LIPID PANEL: CPT

## 2022-11-02 PROCEDURE — 82746 ASSAY OF FOLIC ACID SERUM: CPT

## 2022-11-02 PROCEDURE — 82607 VITAMIN B-12: CPT

## 2022-11-02 PROCEDURE — 80053 COMPREHEN METABOLIC PANEL: CPT

## 2022-11-02 PROCEDURE — 84270 ASSAY OF SEX HORMONE GLOBUL: CPT

## 2022-11-02 PROCEDURE — 84403 ASSAY OF TOTAL TESTOSTERONE: CPT

## 2022-11-02 PROCEDURE — 85025 COMPLETE CBC W/AUTO DIFF WBC: CPT

## 2022-11-02 PROCEDURE — 82306 VITAMIN D 25 HYDROXY: CPT

## 2022-11-02 PROCEDURE — 84443 ASSAY THYROID STIM HORMONE: CPT

## 2022-11-02 PROCEDURE — 36415 COLL VENOUS BLD VENIPUNCTURE: CPT

## 2022-11-04 ENCOUNTER — OFFICE VISIT (OUTPATIENT)
Dept: FAMILY MEDICINE CLINIC | Age: 62
End: 2022-11-04
Payer: COMMERCIAL

## 2022-11-04 VITALS
BODY MASS INDEX: 24.05 KG/M2 | OXYGEN SATURATION: 98 % | WEIGHT: 149 LBS | HEART RATE: 83 BPM | SYSTOLIC BLOOD PRESSURE: 124 MMHG | DIASTOLIC BLOOD PRESSURE: 80 MMHG

## 2022-11-04 DIAGNOSIS — R20.2 PARESTHESIA OF LEFT UPPER EXTREMITY: ICD-10-CM

## 2022-11-04 DIAGNOSIS — N52.9 ERECTILE DYSFUNCTION, UNSPECIFIED ERECTILE DYSFUNCTION TYPE: ICD-10-CM

## 2022-11-04 DIAGNOSIS — E78.00 PURE HYPERCHOLESTEROLEMIA: Primary | ICD-10-CM

## 2022-11-04 DIAGNOSIS — R29.898 LEFT HAND WEAKNESS: ICD-10-CM

## 2022-11-04 LAB
SEX HORMONE BINDING GLOBULIN: 64 NMOL/L (ref 11–80)
TESTOSTERONE FREE-NONMALE: 144.6 PG/ML (ref 47–244)
TESTOSTERONE TOTAL: 955 NG/DL (ref 220–1000)

## 2022-11-04 PROCEDURE — G8484 FLU IMMUNIZE NO ADMIN: HCPCS | Performed by: FAMILY MEDICINE

## 2022-11-04 PROCEDURE — G8427 DOCREV CUR MEDS BY ELIG CLIN: HCPCS | Performed by: FAMILY MEDICINE

## 2022-11-04 PROCEDURE — 4004F PT TOBACCO SCREEN RCVD TLK: CPT | Performed by: FAMILY MEDICINE

## 2022-11-04 PROCEDURE — 3017F COLORECTAL CA SCREEN DOC REV: CPT | Performed by: FAMILY MEDICINE

## 2022-11-04 PROCEDURE — 99214 OFFICE O/P EST MOD 30 MIN: CPT | Performed by: FAMILY MEDICINE

## 2022-11-04 PROCEDURE — G8420 CALC BMI NORM PARAMETERS: HCPCS | Performed by: FAMILY MEDICINE

## 2022-11-04 RX ORDER — SILDENAFIL CITRATE 20 MG/1
TABLET ORAL
Qty: 60 TABLET | Refills: 5 | Status: SHIPPED | OUTPATIENT
Start: 2022-11-04

## 2022-11-04 ASSESSMENT — PATIENT HEALTH QUESTIONNAIRE - PHQ9
SUM OF ALL RESPONSES TO PHQ QUESTIONS 1-9: 2
2. FEELING DOWN, DEPRESSED OR HOPELESS: 1
SUM OF ALL RESPONSES TO PHQ QUESTIONS 1-9: 2
1. LITTLE INTEREST OR PLEASURE IN DOING THINGS: 1
SUM OF ALL RESPONSES TO PHQ9 QUESTIONS 1 & 2: 2
SUM OF ALL RESPONSES TO PHQ QUESTIONS 1-9: 2
SUM OF ALL RESPONSES TO PHQ QUESTIONS 1-9: 2

## 2022-11-04 ASSESSMENT — ENCOUNTER SYMPTOMS
CONSTIPATION: 0
SHORTNESS OF BREATH: 0
RHINORRHEA: 0
CHEST TIGHTNESS: 0
DIARRHEA: 0

## 2022-11-04 NOTE — PROGRESS NOTES
SUBJECTIVE:    Irma Curtis is a 58 y.o. male who presents for a follow up visit. Chief Complaint   Patient presents with    Neck Pain     Pinched nerve in neck, pain goes down into left hand/arm. Now has numbness down left arm, hard time using that hand. Has been going to chiropractor. Shoulder Pain   The pain is present in the left shoulder and left arm. This is a chronic problem. The current episode started more than 1 year ago. The problem occurs intermittently. The problem has been waxing and waning. The quality of the pain is described as aching and sharp. The pain is moderate. Pertinent negatives include no limited range of motion, numbness or stiffness. He has tried NSAIDS for the symptoms. The treatment provided mild relief. Fatigue  Associated symptoms include arthralgias (left shoulder) and fatigue. Pertinent negatives include no chest pain, congestion or numbness. Nothing aggravates the symptoms. He has tried nothing for the symptoms. Hyperlipidemia  This is a chronic problem. The current episode started more than 1 year ago. Lipid results: Total cholesterol 210, triglycerides 43, HDL 71, . Pertinent negatives include no chest pain or shortness of breath. He is currently on no antihyperlipidemic treatment. Compliance problems include adherence to exercise and adherence to diet. Risk factors for coronary artery disease include male sex and dyslipidemia. Having associated numbness in left hand. Weakness in left hand for about 10 days      Patient's medications, allergies, past medical,surgical, social and family histories were reviewed and updated as appropriate. No past medical history on file.   Past Surgical History:   Procedure Laterality Date    APPENDECTOMY      CARPAL TUNNEL RELEASE Left 12/21/2017    Left  Carpal Tunnel Release & Left Ulnar Nerve decompression at the Cubital Tunnel     COLONOSCOPY      ELBOW SURGERY      left ulnar nerve entrapment    KNEE ARTHROPLASTY Right 03/28/2017    RIGHT PARTIAL KNEE ARTHROPLASTY           SHOULDER SURGERY Right      Family History   Problem Relation Age of Onset    Heart Disease Brother 64    Hypertension Father     Stroke Father     Hypertension Brother     Alcohol Abuse Brother     Other Brother         Factor V Leiden    Other Mother         dementia     Social History     Tobacco Use    Smoking status: Some Days     Packs/day: 1.00     Years: 20.00     Pack years: 20.00     Types: Cigars, Cigarettes    Smokeless tobacco: Never    Tobacco comments:     one cigar daily    Substance Use Topics    Alcohol use: Yes     Comment: heavy, 5/day beer      No Known Allergies  Current Outpatient Medications on File Prior to Visit   Medication Sig Dispense Refill    ibuprofen (IBU) 400 MG tablet Take 1 tablet by mouth every 6 hours as needed for Pain 20 tablet 0    Cholecalciferol (VITAMIN D) 50 MCG (2000 UT) CAPS capsule Take 1 capsule by mouth daily      b complex vitamins capsule Take 1 capsule by mouth daily      Garlic 3492 MG TABS Take by mouth      Multiple Vitamins-Minerals (MULTIVITAMIN ADULT PO) Take by mouth       No current facility-administered medications on file prior to visit. Review of Systems   Constitutional:  Positive for fatigue. HENT:  Negative for congestion, postnasal drip and rhinorrhea. Respiratory:  Negative for chest tightness and shortness of breath. Cardiovascular:  Negative for chest pain and palpitations. Gastrointestinal:  Negative for constipation and diarrhea. Genitourinary:  Negative for difficulty urinating. Musculoskeletal:  Positive for arthralgias (left shoulder). Negative for stiffness. Neurological:  Negative for numbness. Psychiatric/Behavioral:  Positive for sleep disturbance (MNA due to shoulder pain). Negative for dysphoric mood. The patient is not nervous/anxious.       OBJECTIVE:    /80   Pulse 83   Wt 149 lb (67.6 kg)   SpO2 98%   BMI 24.05 kg/m² Physical Exam  Constitutional:       Appearance: He is well-developed. HENT:      Head: Normocephalic and atraumatic. Right Ear: External ear normal.      Left Ear: External ear normal.      Nose: Nose normal.   Eyes:      General:         Right eye: No discharge. Conjunctiva/sclera: Conjunctivae normal.   Neck:      Thyroid: No thyromegaly. Vascular: No JVD. Trachea: No tracheal deviation. Cardiovascular:      Rate and Rhythm: Normal rate and regular rhythm. Heart sounds: Normal heart sounds. Pulmonary:      Effort: Pulmonary effort is normal. No respiratory distress. Breath sounds: Normal breath sounds. No rales. Musculoskeletal:      Cervical back: Normal range of motion and neck supple. Lymphadenopathy:      Cervical: No cervical adenopathy. Skin:     General: Skin is warm and dry. Neurological:      Mental Status: He is alert and oriented to person, place, and time. Motor: Weakness (left hand with decreased ) present. Psychiatric:         Mood and Affect: Mood normal.         Behavior: Behavior normal.       ASSESSMENT/PLAN:    Chance Daly was seen today for neck pain. Diagnoses and all orders for this visit:    Pure hypercholesterolemia  Encouraged increase exercise, especially walking about 30 minutes daily. Erectile dysfunction, unspecified erectile dysfunction type  -     sildenafil (REVATIO) 20 MG tablet; TAKE 1 TO 3 TABLETS BY MOUTH DAILY AS NEEDED    Paresthesia of left upper extremity  -     Yasmin - Nick Mayers MD (Inpatient and Outpatient EMG), [facility]    Left hand weakness         -   Deric Junior MD    Return in about 4 weeks (around 12/2/2022). Please note portions of this note were completed with a voicerecognition program.  Efforts were made to edit the dictations but occasionally words are mis-transcribed.

## 2022-11-07 ENCOUNTER — OFFICE VISIT (OUTPATIENT)
Dept: NEUROLOGY | Age: 62
End: 2022-11-07
Payer: COMMERCIAL

## 2022-11-07 DIAGNOSIS — M79.602 LEFT ARM PAIN: Primary | ICD-10-CM

## 2022-11-07 PROCEDURE — 95886 MUSC TEST DONE W/N TEST COMP: CPT | Performed by: PSYCHIATRY & NEUROLOGY

## 2022-11-07 PROCEDURE — 95909 NRV CNDJ TST 5-6 STUDIES: CPT | Performed by: PSYCHIATRY & NEUROLOGY

## 2022-11-07 NOTE — PROGRESS NOTES
Chuy Floyd M.D. [facility]) Physicians/[place] [facility]  Board Certified in [address] [facility] [address] [facility], [address] [facility], [address] [facility]    EMG / NERVE CONDUCTION STUDY      PATIENT:  Bonnie Cabrera       DATE OF EM22     YOB: 1960       REASON FOR EMG:   Left arm pain and numbness      REFERRING PHYSICIAN:  Chester Quezada MD  [address] [facility],  [address] [facility]     SUMMARY:   The left median sensory nerve study had a prolonged distal latency. The left median motor nerve study had a low amplitude. The left ulnar motor nerve study had a slowing of conduction velocity across the elbow. The left ulnar sensory nerve study had a borderline amplitude. The left radial sensory nerve study was normal.  Needle EMG of several muscles in the left upper extremity showed evidence of denervation in the abductor pollicis brevis as well as the first dorsal interosseous muscle and in the cervical paraspinal muscles     CLINICAL DIAGNOSIS:  Neuropathy versus radiculopathy        EMG RESULTS:     1. This patient has a left C8-T1 nerve root lesion. 2.  This patient has a left ulnar nerve lesion at the elbow. 3.  This patient also has a mild left median nerve lesion at the wrist.  (Carpal tunnel syndrome.        ---------------------------------------------  Chuy Floyd M.D.   Electromyographer / Neurologist

## 2022-11-08 ENCOUNTER — TELEPHONE (OUTPATIENT)
Dept: FAMILY MEDICINE CLINIC | Age: 62
End: 2022-11-08

## 2022-11-08 DIAGNOSIS — R94.131 ABNORMAL EMG: Primary | ICD-10-CM

## 2022-11-08 DIAGNOSIS — M54.12 CERVICAL RADICULOPATHY: Primary | ICD-10-CM

## 2022-11-08 RX ORDER — HYDROCODONE BITARTRATE AND ACETAMINOPHEN 7.5; 325 MG/1; MG/1
1 TABLET ORAL EVERY 6 HOURS PRN
Qty: 28 TABLET | Refills: 0 | Status: SHIPPED | OUTPATIENT
Start: 2022-11-08 | End: 2022-11-21 | Stop reason: SDUPTHER

## 2022-11-08 RX ORDER — PREDNISONE 10 MG/1
TABLET ORAL
Qty: 30 TABLET | Refills: 0 | Status: SHIPPED | OUTPATIENT
Start: 2022-11-08

## 2022-11-08 NOTE — TELEPHONE ENCOUNTER
EMG / NERVE CONDUCTION STUDY        PATIENT:  Jazmín Zacarias        DATE OF EM22      YOB: 1960        REASON FOR EMG:   Left arm pain and numbness        REFERRING PHYSICIAN:  Iris Farr MD  [address] [place] [address]      SUMMARY:   The left median sensory nerve study had a prolonged distal latency. The left median motor nerve study had a low amplitude. The left ulnar motor nerve study had a slowing of conduction velocity across the elbow. The left ulnar sensory nerve study had a borderline amplitude. The left radial sensory nerve study was normal.  Needle EMG of several muscles in the left upper extremity showed evidence of denervation in the abductor pollicis brevis as well as the first dorsal interosseous muscle and in the cervical paraspinal muscles      CLINICAL DIAGNOSIS:  Neuropathy versus radiculopathy           EMG RESULTS:      1. This patient has a left C8-T1 nerve root lesion. 2.  This patient has a left ulnar nerve lesion at the elbow. 3.  This patient also has a mild left median nerve lesion at the wrist.  (Carpal tunnel syndrome.           ---------------------------------------------  Becca Campbell M.D.   Electromyographer / Neurologist

## 2022-11-08 NOTE — PROGRESS NOTES
Received patient's EMG and nerve conduction study that revealed a left C8-T1 nerve root lesion as well as left ulnar nerve lesion at the elbow. He has mild left median nerve lesion at the wrist consistent with carpal tunnel syndrome. Prescription for prednisone tapering dose and [place] sent to his pharmacy. Patient is also referred to [address] [place] [address] back and spine and an MRI of the C-spine is ordered.

## 2022-11-08 NOTE — TELEPHONE ENCOUNTER
Referral placed. MRI order in [facility]. Called pt and advised.  Pt stated understanding

## 2022-11-17 ENCOUNTER — HOSPITAL ENCOUNTER (OUTPATIENT)
Dept: MRI IMAGING | Age: 62
Discharge: HOME | End: 2022-11-17
Payer: COMMERCIAL

## 2022-11-17 DIAGNOSIS — R94.131 ABNORMAL EMG: ICD-10-CM

## 2022-11-17 PROCEDURE — 72141 MRI NECK SPINE W/O DYE: CPT

## 2022-11-21 ENCOUNTER — TELEPHONE (OUTPATIENT)
Dept: FAMILY MEDICINE CLINIC | Age: 62
End: 2022-11-21

## 2022-11-21 DIAGNOSIS — M54.12 CERVICAL RADICULOPATHY: ICD-10-CM

## 2022-11-21 RX ORDER — HYDROCODONE BITARTRATE AND ACETAMINOPHEN 7.5; 325 MG/1; MG/1
1 TABLET ORAL EVERY 6 HOURS PRN
Qty: 12 TABLET | Refills: 0 | Status: SHIPPED | OUTPATIENT
Start: 2022-11-21 | End: 2022-11-24

## 2022-11-21 RX ORDER — PREDNISONE 10 MG/1
TABLET ORAL
Qty: 30 TABLET | Refills: 0 | OUTPATIENT
Start: 2022-11-21

## 2022-11-21 NOTE — TELEPHONE ENCOUNTER
Medication  HYDROcodone-acetaminophen (NORCO) 7.5-325 MG per tablet [91282]  HYDROcodone-acetaminophen (NORCO) 7.5-325 MG per tablet [[id number]]  ENDED    Order Details  Dose: 1 tablet Route: Oral Frequency: EVERY 6 HOURS PRN for Pain   Dispense Quantity: 28 tablet Refills: 0    Note to Pharmacy: Reduce doses taken as pain becomes manageabl       predniSONE (DELTASONE) 10 MG tablet [[id number]]     Order Details  Dose, Route, Frequency: As Directed   Dispense Quantity: 30 tablet Refills: 0          Sig: Take 4 tablets daily for 3 days, then 3 tablets daily for 3 days, then 2 tablets daily for 3 days then 1 tablet daily until finished.        [id number] [place] [facility] [address]   [address] [place] [address] [address] [address]   Phone:  [phone]  Fax:  [phone]

## 2022-11-22 ENCOUNTER — OFFICE VISIT (OUTPATIENT)
Dept: ORTHOPEDIC SURGERY | Age: 62
End: 2022-11-22
Payer: COMMERCIAL

## 2022-11-22 VITALS — BODY MASS INDEX: 23.31 KG/M2 | HEIGHT: 66 IN | WEIGHT: 145.06 LBS

## 2022-11-22 DIAGNOSIS — M50.30 DDD (DEGENERATIVE DISC DISEASE), CERVICAL: ICD-10-CM

## 2022-11-22 DIAGNOSIS — M47.812 CS (CERVICAL SPONDYLOSIS): ICD-10-CM

## 2022-11-22 DIAGNOSIS — M54.12 RADICULITIS OF LEFT CERVICAL REGION: ICD-10-CM

## 2022-11-22 DIAGNOSIS — M48.02 FORAMINAL STENOSIS OF CERVICAL REGION: ICD-10-CM

## 2022-11-22 DIAGNOSIS — M54.12 CERVICAL RADICULOPATHY AT C8: Primary | ICD-10-CM

## 2022-11-22 PROCEDURE — 3017F COLORECTAL CA SCREEN DOC REV: CPT | Performed by: INTERNAL MEDICINE

## 2022-11-22 PROCEDURE — G8427 DOCREV CUR MEDS BY ELIG CLIN: HCPCS | Performed by: INTERNAL MEDICINE

## 2022-11-22 PROCEDURE — 4004F PT TOBACCO SCREEN RCVD TLK: CPT | Performed by: INTERNAL MEDICINE

## 2022-11-22 PROCEDURE — G8420 CALC BMI NORM PARAMETERS: HCPCS | Performed by: INTERNAL MEDICINE

## 2022-11-22 PROCEDURE — 99204 OFFICE O/P NEW MOD 45 MIN: CPT | Performed by: INTERNAL MEDICINE

## 2022-11-22 PROCEDURE — G8484 FLU IMMUNIZE NO ADMIN: HCPCS | Performed by: INTERNAL MEDICINE

## 2022-11-22 NOTE — LETTER
[address]  [address] [facility] [address]  Phone: [phone]  Fax: [phone]           Lidia Batres MD      November 22, 2022     Patient: Minda Vegas   MR Number: [id number]   YOB: 1960   Date of Visit: 11/22/2022       Dear Dr. Lilian Kim ref. provider found: Thank you for referring Hoda Gonzales to me for evaluation/treatment. Below are the relevant portions of my assessment and plan of care. Impression: . Encounter Diagnoses   Name Primary?  Cervical radiculopathy at C8 Yes    Radiculitis of left cervical region     Foraminal stenosis of cervical region     CS (cervical spondylosis)     DDD (degenerative disc disease), cervical               Plan:       C RAVINDER-C7/T1 left paraxial  Cervical stabilization home exercise program  Active modification cervical precautions  Consider orthopedic spine surgery consultation as needed  Continue use of home traction device    Approximately 45 minutes was spent related to previewing pertinent medical documentation prior to the patient's visit along with counseling during the patient's visit with respect to treatment options inclusive of alternatives to treatment and the complications and risks related to those treatment options along with expectations of outcome related to those treatments and inclusive of time in the documentation and ordering of testing and treatment after the visit. The nature of the finding, probable diagnosis and likely treatment was thoroughly discussed with the patient. The options, risks, complications, alternative treatment as well as some of the differential diagnosis was discussed. The patient was thoroughly informed and all questions were answered. the patient indicated understanding and satisfaction with the discussion.       Orders:        Orders Placed This Encounter   Procedures    Ambulatory epidural steroid injection     Left paraxial-C7/T1     Standing Status:   Future     Standing Expiration Date:   11/22/2023     Scheduling Instructions:      Dr. Rimma Brewster: \"This note was dictated with voice recognition software. Though review and correction are routine, we apologize for any errors. \"             If you have questions, please do not hesitate to call me. I look forward to following Verna Mao along with you.     Sincerely,        Prashant Mckeon MD    CC providers:  Martir Morales ATC  [address]

## 2022-11-22 NOTE — PROGRESS NOTES
Chief Complaint:   Chief Complaint   Patient presents with    Neck Pain    Back Pain    Arm Pain          History of Present Illness:       Patient is a 58 y.o. male presents with the above complaint. The symptoms of arm pain began 2 months ago and started without an injury. The subjective sense of weakness is more recent ongoing for approximately 2-week duration of time. The patient describes a numbness pins-and-needles and stabbing pain that does radiate. The symptoms are  near constant  and are are worsening since the onset. His past history significant for carpal tunnel and cubital tunnel releases in the remote past on the same left side. The patient has noted new onset or progressive weakness of the upper extremites especially with respect to forming a fist and gripping. The neck pain : arm pain is 0 : 100 and follows a C8 dermatomal pattern. Treatment to date has included prednisone taper and symptoms have not improved with this treatment. The patient denies history of neck trauma. Their is not history or orthopaedic cervical spine surgery. Work up to date has included: MRI and EMG which is outlined below. The patient has no prior history of autoimmune disease, inflammatory arthropathy or crystal arthropathy. Past Medical History:      No past medical history on file. Past Surgical History:   Procedure Laterality Date    APPENDECTOMY      CARPAL TUNNEL RELEASE Left 12/21/2017    Left  Carpal Tunnel Release & Left Ulnar Nerve decompression at the Cubital Tunnel     COLONOSCOPY      ELBOW SURGERY      left ulnar nerve entrapment    KNEE ARTHROPLASTY Right 03/28/2017    RIGHT PARTIAL KNEE ARTHROPLASTY           SHOULDER SURGERY Right          Present Medications:         Current Outpatient Medications   Medication Sig Dispense Refill    HYDROcodone-acetaminophen (NORCO) 7.5-325 MG per tablet Take 1 tablet by mouth every 6 hours as needed for Pain for up to 3 days. Intended supply: 7 days. Take lowest dose possible to manage pain 12 tablet 0    predniSONE (DELTASONE) 10 MG tablet Take 4 tablets daily for 3 days, then 3 tablets daily for 3 days, then 2 tablets daily for 3 days then 1 tablet daily until finished. 30 tablet 0    sildenafil (REVATIO) 20 MG tablet TAKE 1 TO 3 TABLETS BY MOUTH DAILY AS NEEDED 60 tablet 5    ibuprofen (IBU) 400 MG tablet Take 1 tablet by mouth every 6 hours as needed for Pain 20 tablet 0    Cholecalciferol (VITAMIN D) 50 MCG (2000 UT) CAPS capsule Take 1 capsule by mouth daily      b complex vitamins capsule Take 1 capsule by mouth daily      Garlic 9712 MG TABS Take by mouth      Multiple Vitamins-Minerals (MULTIVITAMIN ADULT PO) Take by mouth       No current facility-administered medications for this visit.          Allergies:      No Known Allergies     Social History:         Social History     Socioeconomic History    Marital status:      Spouse name: Not on file    Number of children: Not on file    Years of education: Not on file    Highest education level: Not on file   Occupational History    Not on file   Tobacco Use    Smoking status: Some Days     Packs/day: 1.00     Years: 20.00     Pack years: 20.00     Types: Cigars, Cigarettes    Smokeless tobacco: Never    Tobacco comments:     one cigar daily    Substance and Sexual Activity    Alcohol use: Yes     Comment: heavy, 5/day beer    Drug use: No    Sexual activity: Not on file   Other Topics Concern    Not on file   Social History Narrative    Not on file     Social Determinants of Health     Financial Resource Strain: Not on file   Food Insecurity: Not on file   Transportation Needs: Not on file   Physical Activity: Not on file   Stress: Not on file   Social Connections: Not on file   Intimate Partner Violence: Not on file   Housing Stability: Not on file        Review of Symptoms:    Pertinent items are noted in HPI    Review of systems reviewed from Patient History Form dated on today's date and   available in the patient's chart under the Media tab. Vital Signs: There were no vitals filed for this visit. General Exam:     Constitutional: Patient is adequately groomed with no evidence of malnutrition  Mental Status: The patient is oriented to time, place and person. The patient's mood and affect are appropriate. Vascular: Examination reveals no swelling or calf tenderness. Peripheral pulses are palpable and 2+. Lymphatics: no lymphadenopathy of the inguinal region or lower extremity      Physical Exam:  Cervical  neck      Primary Exam:    Inspection: No deformity attributable curvature      Palpation: No focal trigger point tenderness      Range of Motion: Mild restriction with extension, mild asymmetric restriction with rotation to the left as compared to right knee full range of flexion      Strength: Diminished C8 distribution of the hand left, 5 -/5 weakness APB, 4/5 weakness wrist flexion normal wrist extension      Special Tests: Nonantalgic      Skin: There are no rashes, ulcerations or lesions. Surgical incisions consistent with cubital tunnel and carpal tunnel releases-left upper extremity      Gait: Intact lower      Reflex intact and normal at the biceps bilaterally     Additional Comments:        Additional Examinations:           Neurologic -Light touch sensation diminished C8 distribution left hand otherwise normal C5 to C7, normal on the right; manual muscle demonstrates 4/5 weakness C8 distribution of the hand-first dorsal interosseous and abductor digiti minimi, 5 -/5 C6 distribution, 5/5 C7 distribution, manual muscle testing is normal C5-C8 on the right,. Biceps reflexes are symmetric and +2.  Spurlling sign is negative            Office Imaging Results/Procedures PerformedToday:            Office Procedures:     Orders Placed This Encounter   Procedures    Ambulatory epidural steroid injection     Left paraxial-C7/T1     Standing Status:   Future Standing Expiration Date:   11/22/2023     Scheduling Instructions:      Dr. Cruzito Rodgers             Other Outside Imaging and Testing Personally Reviewed:    MRI CERVICAL SPINE WO CONTRAST    Result Date: 11/18/2022  EXAMINATION: MRI OF THE CERVICAL SPINE WITHOUT CONTRAST 11/17/2022 8:28 am TECHNIQUE: Multiplanar multisequence MRI of the cervical spine was performed without the administration of intravenous contrast. COMPARISON: None. HISTORY: ORDERING SYSTEM PROVIDED HISTORY: Abnormal EMG TECHNOLOGIST PROVIDED HISTORY: Reason for Exam: Numbness from left side of neck down left arm into hand x 5 weeks. No known injury. Initial evaluation. FINDINGS: BONES/ALIGNMENT: The vertebral body heights appear maintained. There is slight straightening of the normal cervical lordosis. Mild retrolisthesis at C5-C6 with minimal grade 1 anterolisthesis at C7-T1 and T1-T2. There is loss of disc space height at C5-C6 with mixed Modic type 1 and type 2 degenerative plate change. There may be minimal bone marrow edema associated with C7-T1 facet arthrosis bilaterally. SPINAL CORD: No abnormal cord signal is seen. SOFT TISSUES: No paraspinal mass identified. C2-C3: There is no significant disc bulge, spinal canal stenosis or neural foraminal narrowing. C3-C4: There is a disc bulge contacting the ventral thecal sac. No significant spinal canal stenosis. Uncovertebral joint and facet arthrosis contributes to moderate right and mild left neural foraminal narrowing. C4-C5: There is a disc bulge contacting the ventral thecal sac. No significant spinal canal stenosis. Uncovertebral joint and facet arthrosis contributes to moderate to severe left neural foraminal narrowing. No significant right neural foraminal narrowing. C5-C6: There is a posterior disc osteophyte complex indenting on the ventral thecal sac. Mild spinal canal stenosis.   Uncovertebral and facet arthrosis contributes to moderate to severe right and severe left neural foraminal narrowing. C6-C7: No significant disc bulge or spinal canal stenosis. Uncovertebral joint and facet arthrosis contributes to mild right and minimal left neural foraminal narrowing. C7-T1: There is a disc bulge with buckling of the ligamentum flavum. Minimal spinal canal stenosis. Uncovertebral joint and facet arthrosis contribute to severe bilateral neural foraminal narrowing. There appears to be severe right and moderate to severe left neural foraminal narrowing at T1-T2 and T2-T3. 1. Mild spinal canal stenosis at C5-C6. Minimal spinal canal stenosis at C7-T1. 2. Multilevel neural foraminal narrowing as above. 3. Mild retrolisthesis at C5-C6 with loss of disc space height as well as mixed Modic type 1 and type 2 degenerative plate change. 4. Minimal bone marrow edema associated with C7-T1 facet arthrosis bilaterally. PATIENT:  Alex Paulino        DATE OF EM22      YOB: 1960        REASON FOR EMG:   Left arm pain and numbness        REFERRING PHYSICIAN:  Flavia Soto MD  [address] [place] [address]      SUMMARY:   The left median sensory nerve study had a prolonged distal latency. The left median motor nerve study had a low amplitude. The left ulnar motor nerve study had a slowing of conduction velocity across the elbow. The left ulnar sensory nerve study had a borderline amplitude. The left radial sensory nerve study was normal.  Needle EMG of several muscles in the left upper extremity showed evidence of denervation in the abductor pollicis brevis as well as the first dorsal interosseous muscle and in the cervical paraspinal muscles      CLINICAL DIAGNOSIS:  Neuropathy versus radiculopathy           EMG RESULTS:      1. This patient has a left C8-T1 nerve root lesion. 2.  This patient has a left ulnar nerve lesion at the elbow.      3.  This patient also has a mild left median nerve lesion at the wrist.  (Carpal tunnel syndrome.           ---------------------------------------------        Assessment   Impression: . Encounter Diagnoses   Name Primary? Cervical radiculopathy at C8 Yes    Radiculitis of left cervical region     Foraminal stenosis of cervical region     CS (cervical spondylosis)     DDD (degenerative disc disease), cervical               Plan:       C RAVINDER-C7/T1 left paraxial  Cervical stabilization home exercise program  Active modification cervical precautions  Consider orthopedic spine surgery consultation as needed  Continue use of home traction device    Approximately 45 minutes was spent related to previewing pertinent medical documentation prior to the patient's visit along with counseling during the patient's visit with respect to treatment options inclusive of alternatives to treatment and the complications and risks related to those treatment options along with expectations of outcome related to those treatments and inclusive of time in the documentation and ordering of testing and treatment after the visit. The nature of the finding, probable diagnosis and likely treatment was thoroughly discussed with the patient. The options, risks, complications, alternative treatment as well as some of the differential diagnosis was discussed. The patient was thoroughly informed and all questions were answered. the patient indicated understanding and satisfaction with the discussion. Orders:        Orders Placed This Encounter   Procedures    Ambulatory epidural steroid injection     Left paraxial-C7/T1     Standing Status:   Future     Standing Expiration Date:   11/22/2023     Scheduling Instructions:      Dr. Keila Ribera: \"This note was dictated with voice recognition software. Though review and correction are routine, we apologize for any errors. \"

## 2022-12-01 ENCOUNTER — HOSPITAL ENCOUNTER (OUTPATIENT)
Dept: INTERVENTIONAL RADIOLOGY/VASCULAR | Age: 62
Discharge: HOME | End: 2022-12-01
Payer: COMMERCIAL

## 2022-12-01 DIAGNOSIS — M54.12 CERVICAL RADICULITIS: ICD-10-CM

## 2022-12-01 PROCEDURE — 62321 NJX INTERLAMINAR CRV/THRC: CPT

## 2022-12-01 PROCEDURE — 6360000002 HC RX W HCPCS

## 2022-12-01 PROCEDURE — 6360000004 HC RX CONTRAST MEDICATION: Performed by: RADIOLOGY

## 2022-12-01 PROCEDURE — 2500000003 HC RX 250 WO HCPCS

## 2022-12-01 RX ADMIN — IOHEXOL 10 ML: 180 INJECTION INTRAVENOUS at 12:06

## 2022-12-01 NOTE — DISCHARGE INSTRUCTIONS
[facility] [employer]  Cardiovascular Special Procedures  Cervical Epidural Steroid Injection  Patient Discharge Instructions      When [address] should not drive the day of the procedure. You may experience arm weakness during the first 24 hours following the procedure. However, you do not need to stay in bed when you get home. Even if you feel better right away, avoid activities that may strain your neck. Keep in mind that most patients feel increased pain for the first 24 hours. You should start feeling some pain relief 3-7 days following the injection. This is because the steroid will start working within three days of the injection with maximal effect by one week. At that time, we will evaluate your pain level to determine the need for another steroid injection. Remove bandaid(s) within 24 hours. When to Call Your Doctor    Call right away if you notice any of the following symptoms:    Severe pain or headache;  Fever or chills; Redness or swelling around the injection site. You may contact [address] [place], [place] [address]. for any questions or problems that may occur at [phone] during the hours of 9am-5pm Monday-Friday, or the hospital  after hours at [phone], to have the interventional radiologist on call paged. [facility] [employer]  Cardiovascular Special Procedures  General Discharge Instructions    PROCEDURE: ___________Cervical Epidural Steroid Injection______    __x__ Omari Njritts may be drowsy or lightheaded after receiving sedation.  DO NOT operate a vehicle (automobile, bicycle, motorcycle, machinery, or power tools), make any important decisions or sign any important/legal documents, or drink alcoholic beverages for the next 24 hours  __x__ We strongly suggest that a responsible adult be with you for the next 24 hours for your protection and safety  ____ If the intravenous catheter site is painful, apply warm wet compresses on the site until the soreness is relieved and elevate the arm above the heart. Call your physician if no improvement in 2 to 3 days    DIETARY INSTRUCTIONS:    ____ Drink extra fluids over the next 24 hours (If not contraindicated by illness or by                   physician order)  ____ Start with clear liquids and progress to normal diet as you feel like eating. If you experience nausea or repeated episodes of vomiting, which persist beyond 12-24 hours, notify your doctor        ___x_ Resume your previous diet    ACTIVITY INSTRUCTIONS:    ____ See other instructions  ____ No special instructions  ____ Rest for 24 hours    __x__ Up as tolerated  __x__ Increase activity as tolerated    Wound/Dressing Instructions:  ____ See other instructions  __x__ May shower, tomorrow  __x__ Remove bandage within 24 hours    MEDICATION INSTRUCTIONS:    __x__ See Medication Reconciliation Sheet          Please make sure that you follow-up with your doctors office for your results. Call: _________________________________     FOLLOW-UP APPOINTMENT    Follow up with MD in 2 weeks. Belongings returned to patient and/or family: Yes. The Discharge Instructions have been explained to me. I understand and can verbalize these instructions.

## 2022-12-05 ENCOUNTER — TELEPHONE (OUTPATIENT)
Dept: ORTHOPEDIC SURGERY | Age: 62
End: 2022-12-05

## 2022-12-05 NOTE — TELEPHONE ENCOUNTER
General Question     Subject: PT HAD AN EPIDURAL ON Thursday AND HE HAS SOME QUESTIONS TO ASK REGARDING THE PAIN SHIFTING FROM ONE AREA TO ANOTHER. PLEASE CALL TO DISCUSS.   Patient and /or Facility Request: Gisele Reynaga  Contact Number: [phone]

## 2022-12-06 DIAGNOSIS — M54.12 CERVICAL RADICULOPATHY: ICD-10-CM

## 2022-12-06 RX ORDER — HYDROCODONE BITARTRATE AND ACETAMINOPHEN 7.5; 325 MG/1; MG/1
1 TABLET ORAL EVERY 6 HOURS PRN
Qty: 12 TABLET | Refills: 0 | OUTPATIENT
Start: 2022-12-06 | End: 2022-12-09

## 2022-12-06 NOTE — TELEPHONE ENCOUNTER
Medication:   Requested Prescriptions      No prescriptions requested or ordered in this encounter      Last Filled:  11/21/2022    Patient Phone Number: [phone] (home) [phone] (work)    Last appt: 11/4/2022   Next appt: 12/14/2022    Last OARRS: No flowsheet data found. PDMP Monitoring:    Last PDMP Yumiko Michel as Reviewed [facility]):  Review User Review Instant Review Result          Preferred Pharmacy:   [facility]pharmacy [address] [place] [address]. - P [phone] - F [phone]  [address] [address]  Phone: [phone] Fax: [phone]

## 2022-12-06 NOTE — TELEPHONE ENCOUNTER
HYDROcodone-acetaminophen (NORCO) 7.5-325 MG per tablet [[id number]]  ENDED    Order Details  Dose: 1 tablet Route: Oral Frequency: EVERY 6 HOURS PRN for Pain   Dispense Quantity: 12 tablet Refills: 0    Note to Pharmacy: Reduce doses taken as pain becomes manageable         Sig: Take 1 tablet by mouth every 6 hours as needed for Pain for up to 3 days. Intended supply: 7 days.  Take lowest dose possible to manage pain         [id number] [address]   [address] [place] [address] Rd[address] [place] [address]   Phone:  [phone]  Fax:  [phone]

## 2022-12-07 ENCOUNTER — TELEPHONE (OUTPATIENT)
Dept: ORTHOPEDIC SURGERY | Age: 62
End: 2022-12-07

## 2022-12-07 DIAGNOSIS — M54.12 CERVICAL RADICULOPATHY AT C8: Primary | ICD-10-CM

## 2022-12-07 DIAGNOSIS — M54.12 CERVICAL RADICULOPATHY AT C8: ICD-10-CM

## 2022-12-07 RX ORDER — GABAPENTIN 300 MG/1
300 CAPSULE ORAL NIGHTLY
Qty: 30 CAPSULE | Refills: 0 | Status: SHIPPED | OUTPATIENT
Start: 2022-12-07 | End: 2023-01-06

## 2022-12-07 RX ORDER — HYDROCODONE BITARTRATE AND ACETAMINOPHEN 5; 325 MG/1; MG/1
1 TABLET ORAL EVERY 6 HOURS PRN
Qty: 20 TABLET | Refills: 0 | Status: SHIPPED | OUTPATIENT
Start: 2022-12-07 | End: 2022-12-07 | Stop reason: SDUPTHER

## 2022-12-07 RX ORDER — HYDROCODONE BITARTRATE AND ACETAMINOPHEN 5; 325 MG/1; MG/1
1 TABLET ORAL EVERY 6 HOURS PRN
Qty: 20 TABLET | Refills: 0 | Status: SHIPPED | OUTPATIENT
Start: 2022-12-07 | End: 2022-12-12

## 2022-12-07 NOTE — TELEPHONE ENCOUNTER
Pt states that he is having trouble sleeping after CEDI on 12/01/2022. The pain was in his L scapula and now has moved to his L shoulder and radiating down his arm. He requested his PCP refill his [address] due to pain and his PCP said no and is asking if you would do that due to the severity of his pain. Increased pain when laying down or reaching to tie shoe/or any form of reaching. Pain will shoot up to 8/10 with random movements. Please advise.

## 2022-12-07 NOTE — TELEPHONE ENCOUNTER
Pt called back into office. States 'What are you going to do about my suicidal pain'. Advised Pt that Dr Jay Ventura is not going to prescribe him any further narcotics and he will need to reach out to ortho for further pain management. Pt was upset but did state he would contact ortho. Had no further questions at this time.

## 2022-12-09 NOTE — TELEPHONE ENCOUNTER
LVM for pt to let them know that a prescription was sent to the pharmacy we have on record for him. Pt has an appointment next week and we will go over plan of care then.

## 2022-12-14 ENCOUNTER — OFFICE VISIT (OUTPATIENT)
Dept: ORTHOPEDIC SURGERY | Age: 62
End: 2022-12-14
Payer: COMMERCIAL

## 2022-12-14 ENCOUNTER — OFFICE VISIT (OUTPATIENT)
Dept: FAMILY MEDICINE CLINIC | Age: 62
End: 2022-12-14

## 2022-12-14 VITALS
DIASTOLIC BLOOD PRESSURE: 80 MMHG | SYSTOLIC BLOOD PRESSURE: 122 MMHG | TEMPERATURE: 97.1 F | BODY MASS INDEX: 24.38 KG/M2 | WEIGHT: 151 LBS

## 2022-12-14 VITALS — HEIGHT: 65 IN | BODY MASS INDEX: 25.16 KG/M2 | WEIGHT: 151 LBS

## 2022-12-14 VITALS — BODY MASS INDEX: 24.27 KG/M2 | HEIGHT: 66 IN | WEIGHT: 151.01 LBS

## 2022-12-14 DIAGNOSIS — M48.02 FORAMINAL STENOSIS OF CERVICAL REGION: ICD-10-CM

## 2022-12-14 DIAGNOSIS — M54.12 CERVICAL RADICULOPATHY AT C8: Primary | ICD-10-CM

## 2022-12-14 DIAGNOSIS — G56.02 CARPAL TUNNEL SYNDROME OF LEFT WRIST: Primary | ICD-10-CM

## 2022-12-14 DIAGNOSIS — M54.12 RADICULITIS OF LEFT CERVICAL REGION: ICD-10-CM

## 2022-12-14 DIAGNOSIS — M54.12 CERVICAL RADICULOPATHY: ICD-10-CM

## 2022-12-14 DIAGNOSIS — M47.812 CS (CERVICAL SPONDYLOSIS): ICD-10-CM

## 2022-12-14 DIAGNOSIS — M47.22 CERVICAL SPONDYLOSIS WITH RADICULOPATHY: Primary | ICD-10-CM

## 2022-12-14 DIAGNOSIS — E78.2 MIXED HYPERLIPIDEMIA: ICD-10-CM

## 2022-12-14 PROCEDURE — 99213 OFFICE O/P EST LOW 20 MIN: CPT | Performed by: ORTHOPAEDIC SURGERY

## 2022-12-14 PROCEDURE — 3017F COLORECTAL CA SCREEN DOC REV: CPT | Performed by: INTERNAL MEDICINE

## 2022-12-14 PROCEDURE — 3017F COLORECTAL CA SCREEN DOC REV: CPT | Performed by: ORTHOPAEDIC SURGERY

## 2022-12-14 PROCEDURE — G8427 DOCREV CUR MEDS BY ELIG CLIN: HCPCS | Performed by: ORTHOPAEDIC SURGERY

## 2022-12-14 PROCEDURE — G8484 FLU IMMUNIZE NO ADMIN: HCPCS | Performed by: ORTHOPAEDIC SURGERY

## 2022-12-14 PROCEDURE — G8484 FLU IMMUNIZE NO ADMIN: HCPCS | Performed by: INTERNAL MEDICINE

## 2022-12-14 PROCEDURE — G8427 DOCREV CUR MEDS BY ELIG CLIN: HCPCS | Performed by: INTERNAL MEDICINE

## 2022-12-14 PROCEDURE — 99214 OFFICE O/P EST MOD 30 MIN: CPT | Performed by: INTERNAL MEDICINE

## 2022-12-14 PROCEDURE — 4004F PT TOBACCO SCREEN RCVD TLK: CPT | Performed by: ORTHOPAEDIC SURGERY

## 2022-12-14 PROCEDURE — G8419 CALC BMI OUT NRM PARAM NOF/U: HCPCS | Performed by: ORTHOPAEDIC SURGERY

## 2022-12-14 PROCEDURE — G8420 CALC BMI NORM PARAMETERS: HCPCS | Performed by: INTERNAL MEDICINE

## 2022-12-14 PROCEDURE — 4004F PT TOBACCO SCREEN RCVD TLK: CPT | Performed by: INTERNAL MEDICINE

## 2022-12-14 RX ORDER — GABAPENTIN 300 MG/1
300 CAPSULE ORAL 2 TIMES DAILY
Qty: 60 CAPSULE | Refills: 1 | Status: SHIPPED | OUTPATIENT
Start: 2022-12-14 | End: 2023-02-12

## 2022-12-14 RX ORDER — HYDROCODONE BITARTRATE AND ACETAMINOPHEN 5; 325 MG/1; MG/1
1 TABLET ORAL EVERY 6 HOURS PRN
COMMUNITY

## 2022-12-14 ASSESSMENT — ENCOUNTER SYMPTOMS
CONSTIPATION: 1
SHORTNESS OF BREATH: 0
DIARRHEA: 0

## 2022-12-14 NOTE — PROGRESS NOTES
Chief Complaint:   Chief Complaint   Patient presents with    Neck Pain     F/U cervical, had JOHN on 12/01/2022 and about a week later felt relief in his L shoulder scapula/neck. Now pain is all in his L arm and only when he is laying down. History of Present Illness:       Patient is a 58 y.o. male presents with the above complaint. The patient was seen approximately 3 weeks ago. Unfortunately symptoms show no appreciable change. He has positional arm pain that is predictable with lying supine. The patient had undergone cervical epidural in the interim and reports <25% improvement related to this intervention. There is documentation of anesthetic response to the injection preprocedure pain 8/10 decreased to 2/10 postprocedure. Neck:Leftarm pain 0:100. Pain in arm is aching, burning, or numbness and stabbing in quality. Pain levels:9     The patient claims continued weakness of the  strength left hand  The patient denies new onset gait disturbance. He continues on medical pain management as per previous  inclusive of NSAID-Motrin, gabapentin and as needed use of Norco     Past Medical History:      No past medical history on file. Past Surgical History:   Procedure Laterality Date    APPENDECTOMY      CARPAL TUNNEL RELEASE Left 12/21/2017    Left  Carpal Tunnel Release & Left Ulnar Nerve decompression at the Cubital Tunnel     COLONOSCOPY      ELBOW SURGERY      left ulnar nerve entrapment    KNEE ARTHROPLASTY Right 03/28/2017    RIGHT PARTIAL KNEE ARTHROPLASTY           SHOULDER SURGERY Right          Present Medications:         Current Outpatient Medications   Medication Sig Dispense Refill    gabapentin (NEURONTIN) 300 MG capsule Take 1 capsule by mouth 2 times daily for 60 days.  Intended supply: 30 days 60 capsule 1    HYDROcodone-acetaminophen (NORCO) 5-325 MG per tablet Take 1 tablet by mouth every 6 hours as needed for Pain.      sildenafil (REVATIO) 20 MG tablet TAKE 1 TO 3 TABLETS BY MOUTH DAILY AS NEEDED 60 tablet 5    ibuprofen (IBU) 400 MG tablet Take 1 tablet by mouth every 6 hours as needed for Pain 20 tablet 0    Cholecalciferol (VITAMIN D) 50 MCG (2000 UT) CAPS capsule Take 1 capsule by mouth daily      b complex vitamins capsule Take 1 capsule by mouth daily      Garlic 5375 MG TABS Take by mouth      Multiple Vitamins-Minerals (MULTIVITAMIN ADULT PO) Take by mouth       No current facility-administered medications for this visit. Allergies:      No Known Allergies     Social History:         Social History     Socioeconomic History    Marital status:      Spouse name: Not on file    Number of children: Not on file    Years of education: Not on file    Highest education level: Not on file   Occupational History    Not on file   Tobacco Use    Smoking status: Some Days     Packs/day: 1.00     Years: 20.00     Pack years: 20.00     Types: Cigars, Cigarettes    Smokeless tobacco: Never    Tobacco comments:     one cigar daily    Substance and Sexual Activity    Alcohol use: Yes     Comment: heavy, 5/day beer    Drug use: No    Sexual activity: Not on file   Other Topics Concern    Not on file   Social History Narrative    Not on file     Social Determinants of Health     Financial Resource Strain: Not on file   Food Insecurity: Not on file   Transportation Needs: Not on file   Physical Activity: Not on file   Stress: Not on file   Social Connections: Not on file   Intimate Partner Violence: Not on file   Housing Stability: Not on file        Review of Symptoms:    Pertinent items are noted in HPI         Vital Signs: There were no vitals filed for this visit.      General Exam:     Constitutional: Patient is adequately groomed with no evidence of malnutrition        Physical Exam:  Cervical  neck      Primary Exam:    Inspection: No deformity atrophy appreciable curvature      Palpation: No focal trigger point tenderness      Range of Motion: Severe restriction in extension, mild restriction in flexion mild symmetric restriction with rotation low-grade discomfort in all ranges      Strength:  strength 10 pounds per square inch left, 82 pounds per square inch right; 4/5 weakness C8 distribution: Abductor digit minimi, adductor pollicis and first dorsal interosseous      Special Tests: Spurling sign negative left      Skin: There are no rashes, ulcerations or lesions. Gait: Nonantalgic    Neurologic -on the left: Light touch sensation diminished C8 distribution and manual muscle testing is abnormal: 4/5 weakness C8 distribution of the hand as outlined above in detail. Normal strength C5-C6, and C7 normal at the triceps and 4+/5 weakness EDC; biceps and triceps reflexes are symmetric and +2. Spurlling sign is negative     On the right light touch sensation is intact and motor strength is normal C5-C8. Additional Comments:        Additional Examinations:                  Office Imaging Results/Procedures PerformedToday:           Office Procedures:     Orders Placed This Encounter   Procedures    Alicia Rodriguez MD, Orthopedic Surgery (Spine), [facility]     Referral Priority:   Routine     Referral Type:   Eval and Treat     Referral Reason:   Specialty Services Required     Referred to Provider:   Regino Hart MD     Requested Specialty:   Orthopedic Surgery     Number of Visits Requested:   1             Other Outside Imaging and Testing Personally Reviewed:    No results found. Assessment   Impression: . Encounter Diagnoses   Name Primary? Cervical radiculopathy at C8 Yes    Radiculitis of left cervical region     Foraminal stenosis of cervical region     CS (cervical spondylosis)         Left carpal Tunnel Release and Ulnar Nerve decompression at the Cubital Tunnel done 12/21/17. Plan:      Activity modification cervical disc precautions  Urgent onsultation with orthopedic spine surgery to consider decompression C8 nerve root  Cervical stabilization home exercise program demonstrated in the office  Titrate gabapentin 600 mg divided dosing. Use of Motrin and Norco short-term minimize use of narcotics  Continue home traction device as tolerated        The nature of the finding, probable diagnosis and likely treatment was thoroughly discussed with the patient. The options, risks, complications, alternative treatment as well as some of the differential diagnosis was discussed. The patient was thoroughly informed and all questions were answered. the patient indicated understanding and satisfaction with the discussion. Orders:        Orders Placed This Encounter   Procedures    Alea Agustin MD, Orthopedic Surgery (Spine), [facility]     Referral Priority:   Routine     Referral Type:   Eval and Treat     Referral Reason:   Specialty Services Required     Referred to Provider:   Parvin Blanco MD     Requested Specialty:   Orthopedic Surgery     Number of Visits Requested:   1             Disclaimer: \"This note was dictated with voice recognition software. Though review and correction are routine, we apologize for any errors. \"

## 2022-12-14 NOTE — LETTER
BRINDA INJECTION ORDER    Date:  12/14/22      Patient: Shanell Arboleda     YOB: 1960    Patient Home Phone: [phone] (home)    Diagnosis: ***    Levels: ***    Side: Please check  []LT     []RT     []VEENA     []Midline    Location of Injection: Please check  []Cervical IL Brinda   []Lumbar Transforaminal BRINDA  []Cervical TF Brinda              []Lumbar Interlaminar BRINDA  []Cervical Facet Injection  [] Lumbar Facet Injection    []Cervical Medial Branch Block []Lumbar Medial Branch Block  []Other:                                              []Lumbar Interlaminar BRINDA                                                 [] SI Injection       Service Provider or Service Location: Please check  [][facility]/Dr. Missy Vega    [][facility]  []Dr. Kwadwo Guillaume    []Dr. Veronique Britt   []Other:     Ordering Physician: Radha Simental MA  Signature: Electronically signed by Radha Simental MA    Comments or Special Instructions:       Allergies: No Known Allergies    First Insurance:  Payor: Reinaldo Infante / Plan: Reinaldo Soles / Product Type: *No Product type* /                                ______________________________________________________________________  To be filled in by Scheduling:  Scheduled Date:    Scheduled Time:  Procedure Code:  Pre Cert Information:

## 2022-12-14 NOTE — PROGRESS NOTES
SUBJECTIVE:    Elida Herrera is a 58 y.o. male who presents for a follow up visit. Chief Complaint   Patient presents with    Follow-up     Patient is here for a follow up. Still c/o ongoing pain neck/back, had epidural last week, states it just \"moved the pain\"        Hyperlipidemia  This is a chronic problem. The current episode started more than 1 year ago. Lipid results: Total cholesterol 210, triglycerides 43, HDL 71, . Associated symptoms include myalgias. Pertinent negatives include no chest pain or shortness of breath. He is currently on no antihyperlipidemic treatment. Compliance problems include adherence to exercise and adherence to diet. Risk factors for coronary artery disease include dyslipidemia and male sex. Erectile Dysfunction  This is a chronic problem. The current episode started more than 1 year ago. The problem has been waxing and waning since onset. The nature of his difficulty is maintaining erection. Past treatments include sildenafil. The treatment provided moderate relief. He has been using treatment for 1 to 2 years. Risk factors include tobacco use. Arm Pain   The incident occurred more than 1 week ago. There was no injury mechanism. The pain is present in the left elbow, left forearm and left shoulder. The quality of the pain is described as aching. Pertinent negatives include no chest pain. The symptoms are aggravated by movement. He has tried NSAIDs (Course of prednisone. Recent cervical epidural) for the symptoms. The treatment provided mild relief. EMG done in November revealed C8-T1 nerve root lesion as well as left ulnar nerve lesion at the elbow and mild left median nerve lesion at the wrist.      Patient's medications, allergies, past medical,surgical, social and family histories were reviewed and updated as appropriate. No past medical history on file.   Past Surgical History:   Procedure Laterality Date    APPENDECTOMY      CARPAL TUNNEL RELEASE Left 12/21/2017    Left  Carpal Tunnel Release & Left Ulnar Nerve decompression at the Cubital Tunnel     COLONOSCOPY      ELBOW SURGERY      left ulnar nerve entrapment    KNEE ARTHROPLASTY Right 03/28/2017    RIGHT PARTIAL KNEE ARTHROPLASTY           SHOULDER SURGERY Right      Family History   Problem Relation Age of Onset    Heart Disease Brother 64    Hypertension Father     Stroke Father     Hypertension Brother     Alcohol Abuse Brother     Other Brother         Factor V Leiden    Other Mother         dementia     Social History     Tobacco Use    Smoking status: Some Days     Packs/day: 1.00     Years: 20.00     Pack years: 20.00     Types: Cigars, Cigarettes    Smokeless tobacco: Never    Tobacco comments:     one cigar daily    Substance Use Topics    Alcohol use: Yes     Comment: heavy, 5/day beer      No Known Allergies  Current Outpatient Medications on File Prior to Visit   Medication Sig Dispense Refill    HYDROcodone-acetaminophen (NORCO) 5-325 MG per tablet Take 1 tablet by mouth every 6 hours as needed for Pain.      sildenafil (REVATIO) 20 MG tablet TAKE 1 TO 3 TABLETS BY MOUTH DAILY AS NEEDED 60 tablet 5    ibuprofen (IBU) 400 MG tablet Take 1 tablet by mouth every 6 hours as needed for Pain 20 tablet 0    Cholecalciferol (VITAMIN D) 50 MCG (2000 UT) CAPS capsule Take 1 capsule by mouth daily      b complex vitamins capsule Take 1 capsule by mouth daily      Garlic 1363 MG TABS Take by mouth      Multiple Vitamins-Minerals (MULTIVITAMIN ADULT PO) Take by mouth       No current facility-administered medications on file prior to visit. Review of Systems   Respiratory:  Negative for shortness of breath. Cardiovascular:  Negative for chest pain. Gastrointestinal:  Positive for constipation (due to Hydrocodone use). Negative for diarrhea. Musculoskeletal:  Positive for arthralgias and myalgias. Neurological:  Positive for weakness (in left hand).    Psychiatric/Behavioral:  Positive for sleep disturbance. OBJECTIVE:    /80   Temp 97.1 °F (36.2 °C)   Wt 151 lb (68.5 kg)   BMI 24.38 kg/m²    Physical Exam  Constitutional:       General: He is not in acute distress. Appearance: Normal appearance. He is well-developed. HENT:      Head: Normocephalic and atraumatic. Right Ear: Tympanic membrane and external ear normal.      Left Ear: Tympanic membrane and external ear normal.      Nose: Nose normal.      Mouth/Throat:      Mouth: Mucous membranes are moist.      Pharynx: No posterior oropharyngeal erythema. Eyes:      General:         Right eye: No discharge. Conjunctiva/sclera: Conjunctivae normal.   Neck:      Thyroid: No thyromegaly. Vascular: No JVD. Trachea: No tracheal deviation. Cardiovascular:      Rate and Rhythm: Normal rate and regular rhythm. Heart sounds: Normal heart sounds. Pulmonary:      Effort: Pulmonary effort is normal. No respiratory distress. Breath sounds: Normal breath sounds. No rales. Musculoskeletal:      Cervical back: Normal range of motion and neck supple. Right lower leg: No edema. Left lower leg: No edema. Lymphadenopathy:      Cervical: No cervical adenopathy. Skin:     General: Skin is warm and dry. Neurological:      Mental Status: He is alert and oriented to person, place, and time. Psychiatric:         Mood and Affect: Mood normal.         Behavior: Behavior normal.       ASSESSMENT/PLAN:    Alejandra Dodd was seen today for follow-up. Diagnoses and all orders for this visit:    Carpal tunnel syndrome of left wrist  -     Yasmin Marie MD, Hand Surgery (Hand, Wrist, Upper Extremity), [facility]    Mixed hyperlipidemia  LDL has increased to 130. He does have an excellent HDL at 71 and triglycerides are only 43  -     Comprehensive Metabolic Panel, Fasting; Future  -     CBC with Auto Differential; Future  -     Lipid, Fasting;  Future    Cervical radiculopathy  Continue to see  Emily Shoulders    Return in about 6 months (around 6/14/2023). Please note portions of this note were completed with a voicerecognition program.  Efforts were made to edit the dictations but occasionally words are mis-transcribed.

## 2022-12-14 NOTE — PROGRESS NOTES
New Patient: CERVICAL SPINE    Referring Provider:  Driss Anderson    CHIEF COMPLAINT:    Chief Complaint   Patient presents with    Neck Pain     Patient has neck pain with left arm pain,       HISTORY OF PRESENT ILLNESS:   Mr. Guy Prakash is a pleasant 58 y.o. old male currently furred by Dr. Jeri Crowe for the evaluation of neck and left arm pain. His symptoms began insidiously about 3 months ago. They have increased since that time. Rates his pain 9/10. Notes numbness tingling and weakness of his left arm and loss of fine motor control. He denies gait abnormality. He is status post left carpal tunnel release and ulnar nerve decompression    Current/Past Treatment:   Physical Therapy: No  Chiropractic: Yes  Injection: Yes  Medications: Norco and gabapentin    Past Medical History:   History reviewed. No pertinent past medical history. Past Surgical History:     Past Surgical History:   Procedure Laterality Date    APPENDECTOMY      CARPAL TUNNEL RELEASE Left 12/21/2017    Left  Carpal Tunnel Release & Left Ulnar Nerve decompression at the Cubital Tunnel     COLONOSCOPY      ELBOW SURGERY      left ulnar nerve entrapment    KNEE ARTHROPLASTY Right 03/28/2017    RIGHT PARTIAL KNEE ARTHROPLASTY           SHOULDER SURGERY Right      Current Medications:     Current Outpatient Medications:     HYDROcodone-acetaminophen (NORCO) 5-325 MG per tablet, Take 1 tablet by mouth every 6 hours as needed for Pain., Disp: , Rfl:     gabapentin (NEURONTIN) 300 MG capsule, Take 1 capsule by mouth 2 times daily for 60 days.  Intended supply: 30 days, Disp: 60 capsule, Rfl: 1    sildenafil (REVATIO) 20 MG tablet, TAKE 1 TO 3 TABLETS BY MOUTH DAILY AS NEEDED, Disp: 60 tablet, Rfl: 5    ibuprofen (IBU) 400 MG tablet, Take 1 tablet by mouth every 6 hours as needed for Pain, Disp: 20 tablet, Rfl: 0    Cholecalciferol (VITAMIN D) 50 MCG (2000 UT) CAPS capsule, Take 1 capsule by mouth daily, Disp: , Rfl:     b complex vitamins capsule, Take 1 capsule by mouth daily, Disp: , Rfl:     Garlic 5298 MG TABS, Take by mouth, Disp: , Rfl:     Multiple Vitamins-Minerals (MULTIVITAMIN ADULT PO), Take by mouth, Disp: , Rfl:   Allergies:  Patient has no known allergies. Social History:    reports that he has been smoking cigars and cigarettes. He has a 20.00 pack-year smoking history. He has never used smokeless tobacco. He reports current alcohol use. He reports that he does not use drugs. Family History:   Family History   Problem Relation Age of Onset    Heart Disease Brother 64    Hypertension Father     Stroke Father     Hypertension Brother     Alcohol Abuse Brother     Other Brother         Factor V Leiden    Other Mother         dementia       REVIEW OF SYSTEMS: Full ROS noted & scanned   CONSTITUTIONAL: Denies unexplained weight loss, fevers, chills or fatigue  NEUROLOGICAL: Denies unsteady gait or progressive weakness  MUSCULOSKELETAL: Denies joint swelling or redness  PSYCHOLOGICAL: Denies anxiety, depression   SKIN: Denies skin changes, delayed healing, rash, itching   HEMATOLOGIC: Denies easy bleeding or bruising  ENDOCRINE: Denies excessive thirst, urination, heat/cold  RESPIRATORY: Denies current dyspnea, cough  GI: Denies nausea, vomiting, diarrhea   : Denies bowel or bladder issues       PHYSICAL EXAM:    Vitals: Height 5' 5\" (1.651 m), weight 151 lb (68.5 kg). GENERAL EXAM:  General Apparence: Patient is adequately groomed with no evidence of malnutrition. Orientation: The patient is oriented to time, place and person. Mood & Affect:The patient's mood and affect are appropriate   Vascular: Examination reveals no swelling tenderness in upper or lower extremities.  Good capillary refill  Lymphatic: The lymphatic examination bilaterally reveals all areas to be without enlargement or induration  Sensation: Sensation is intact without deficit  Coordination/Balance: Good coordination     CERVICAL EXAMINATION:  Inspection: Local inspection shows no step-off or bruising. Cervical alignment is normal.     Palpation: No evidence of tenderness at the midline, and trapezius. Paraspinal tenderness is present. There is no step-off or paraspinal spasm. Range of Motion: Cervical flexion, extension, and rotation are mildly reduced with pain. Strength: 5/5 bilateral upper extremities other than 4/5 strength left interosseous muscles  Special Tests:     Mckeon's negative bilaterally. Cubital and Carpal tunnel Tinel's negative bilaterally. Skin:There are no rashes, ulcerations or lesions in right & left upper extremities. Reflexes: Bilaterally triceps, biceps and brachioradialis are 2+. Clonus absent bilaterally at the feet. Gait & station: normal, patient ambulates without assistance     Additional Examinations:       RIGHT UPPER EXTREMITY:  Inspection/examination of the right upper extremity does not show any tenderness, deformity or injury. Range of motion is unremarkable. There is no gross instability. There are no rashes, ulcerations or lesions. Strength and tone are normal.  LEFT UPPER EXTREMITY: Inspection/examination of the left upper extremity does not show any tenderness, deformity or injury. Range of motion is unremarkable. There is no gross instability. There are no rashes, ulcerations or lesions. Strength and tone are normal.    Diagnostic Testing:  Reviewed MRI images of his cervical spine from 11/18/2022 in the office today. Those show spondylosis with severe foraminal stenosis left C4-C5 C5-C6 and C7-T1    I reviewed an EMG from 11/8/2022 in the office today. That showed mild residual left ulnar nerve lesions at the elbow and median nerves at the wrist and C8-T1 radiculopathy    Impression:   Spondylosis with radiculopathy    Plan:    Discussed treatment options including observation, physical therapy, medications, epidural injections and additional imaging.  He would like to proceed with therapy and second epidural injection. He will return to discuss surgery if his symptoms persist after that.   He would likely require an anterior procedure

## 2022-12-16 ENCOUNTER — TELEPHONE (OUTPATIENT)
Dept: ORTHOPEDIC SURGERY | Age: 62
End: 2022-12-16

## 2022-12-16 DIAGNOSIS — M47.22 CERVICAL SPONDYLOSIS WITH RADICULOPATHY: Primary | ICD-10-CM

## 2022-12-16 DIAGNOSIS — M48.02 FORAMINAL STENOSIS OF CERVICAL REGION: ICD-10-CM

## 2022-12-16 DIAGNOSIS — M54.12 CERVICAL RADICULOPATHY AT C8: ICD-10-CM

## 2022-12-16 DIAGNOSIS — M47.812 CS (CERVICAL SPONDYLOSIS): ICD-10-CM

## 2022-12-16 DIAGNOSIS — M54.12 RADICULITIS OF LEFT CERVICAL REGION: ICD-10-CM

## 2022-12-16 NOTE — TELEPHONE ENCOUNTER
General Question     Subject: PATIENT HAS QUESTIONS REGARDING EPIDURAL INJECTION. PLEASE ADVISE.    Patient Hildred Lefort  Contact Number: [phone]

## 2022-12-16 NOTE — TELEPHONE ENCOUNTER
General Question     Subject: Patient is requesting a call back regarding an epidural for pain. Says Dr Kevin Pretty told him that it could be an option.   Patient and /or Facility Request: Octaviano Castillo  Contact Number: [phone]

## 2022-12-16 NOTE — TELEPHONE ENCOUNTER
DANYEL and he states that Dr. Mi King is in agreement with him getting a 2nd epidural. Dr Cristal Maria note is complete and it states that he believes he should get a 2nd one. Unsure if Dr. Mi King is going to be the one to order the epidural or you. Please advise.

## 2022-12-27 ENCOUNTER — HOSPITAL ENCOUNTER (OUTPATIENT)
Dept: PHYSICAL THERAPY | Age: 62
Setting detail: THERAPIES SERIES
Discharge: HOME | End: 2022-12-27
Payer: COMMERCIAL

## 2022-12-27 PROCEDURE — 97110 THERAPEUTIC EXERCISES: CPT

## 2022-12-27 PROCEDURE — 97530 THERAPEUTIC ACTIVITIES: CPT

## 2022-12-27 PROCEDURE — 97161 PT EVAL LOW COMPLEX 20 MIN: CPT

## 2022-12-27 NOTE — FLOWSHEET NOTE
[address]  Phone: [phone]   Fax: [phone]    Physical Therapy Daily Treatment Note    Date:  2022     Patient Name:  Nory Orona    :  1960  MRN: [id number]  Medical Diagnosis:  Cervical spondylosis with radiculopathy [M47.22]  Treatment Diagnosis: decreased cervical ROM, scapular strength, control and postural awareness contributing to persisting intermittent neurological compression contributing to decreased strength in L UE as well as pain and limitations with ADL, IADL, recreational and work activity. Insurance/Certification information:  PT Insurance Information: THE [facility] (20 visits PCY: $50 copay; auth required AFTER visit 12)  Physician Information:  Wilmer Acevedo MD    Plan of care signed (Y/N): []  Yes [x]  No     Date of Patient follow up with Physician:      Progress Report: []  Yes  [x]  No     Date Range for reporting period:  Beginnin2022  Ending:     Progress report due (10 Rx/or 30 days whichever is less): visit #10 or  (date)     Recertification due (POC duration/ or 90 days whichever is less): visit #- or  (date)     Visit # Insurance Allowable Auth required?  Date Range   1 20 [x]  Yes - AFTER VISIT 12  []  No -       Units approved Units used Date Range   - - -     Latex Allergy:  [x]NO      []YES  Preferred Language for Healthcare:   [x]English       []other:    Functional Scale:           Date assessed:  FOTO physical FS primary measure score = -; risk adjusted = -  PLEASE GET NEXT SESSION     Pain level:  0-4/10     SUBJECTIVE:  See eval    OBJECTIVE: See eval  Observation:   Test measurements:      RESTRICTIONS/PRECAUTIONS: Carpal tunnel and cubital tunnel release in 2017; recent EMG showing continued issues at C8-T1, cubital tunnel and median nerve at carpal tunnel; Recent JIMMIE in cervical spine    Exercises/Interventions:   Therapeutic Exercise (69829) Resistance / level Sets/sec Reps Notes Wrist flex/ext with wt 3# 2 10 Changing  on weights HEP   Gripping putty yellow  1 min HEP   Pincer  and pull yellow  x10 HEP   Pad to pad pinch and pull yellow  x10 HEP                                      Therapeutic Activities (97799)                                                 NMR re-education (46609)                                          Manual Intervention (04424)       Cerv mobs/manip       Thoracic mobs/manip       CT manip       Rib mobilizations       STM                  Modalities:     Patient education:  -pt educated on diagnosis, prognosis and expectations for rehab  -all pt questions were answered    Home Exercise Program:  Access Code: [email or web address]  URL: [email or web address]  Date: 12/27/2022  Prepared by: Mackenzie Michaud    Exercises  Seated Wrist Flexion with Dumbbell - 1 x daily - 7 x weekly - 3 sets - 10 reps  Seated Wrist Extension with Dumbbell - 1 x daily - 7 x weekly - 3 sets - 10 reps  Tip Pinch with Putty - 1 x daily - 7 x weekly - 10 reps  Key Pinch with Putty - 1 x daily - 7 x weekly - 20 reps  Thumb Opposition with Putty - 1 x daily - 7 x weekly - 20 reps      Therapeutic Exercise and NMR EXR  [x] (57609) Provided verbal/tactile cueing for activities related to strengthening, flexibility, endurance, ROM  for improvements in cervical, postural, scapular, scapulothoracic and UE control with self care, reaching, carrying, lifting, house/yardwork, driving/computer work.    [] (39200) Provided verbal/tactile cueing for activities related to improving balance, coordination, kinesthetic sense, posture, motor skill, proprioception  to assist with cervical, scapular, scapulothoracic and UE control with self care, reaching, carrying, lifting, house/yardwork, driving/computer work.  [] (05680) Therapist is in constant attendance of 2 or more patients providing skilled therapy interventions, but not providing any significant amount of measurable one-on-one time to either patient, for improvements in cervical, scapular, scapulothoracic and UE control with self care, reaching, carrying, lifting, house/yardwork, driving, computer work. Therapeutic Activities:    [] (37538 or 42599) Provided verbal/tactile cueing for activities related to improving balance, coordination, kinesthetic sense, posture, motor skill, proprioception and motor activation to allow for proper function of cervical, scapular, scapulothoracic and UE control with self care, carrying, lifting, driving/computer work.      Home Exercise Program:    [x] (83906) Reviewed/Progressed HEP activities related to strengthening, flexibility, endurance, ROM of cervical, scapular, scapulothoracic and UE control with self care, reaching, carrying, lifting, house/yardwork, driving/computer work  [] (77520) Reviewed/Progressed HEP activities related to improving balance, coordination, kinesthetic sense, posture, motor skill, proprioception of cervical, scapular, scapulothoracic and UE control with self care, reaching, carrying, lifting, house/yardwork, driving/computer work      Manual Treatments:  PROM / STM / Oscillations-Mobs:  G-I, II, III, IV (PA's, Inf., Post.)  [] (98751) Provided manual therapy to mobilize soft tissue/joints of cervical/CT, scapular GHJ and UE for the purpose of decreasing headache, modulating pain, promoting relaxation,  increasing ROM, reducing/eliminating soft tissue swelling/inflammation/restriction, improving soft tissue extensibility and allowing for proper ROM for normal function with self care, reaching, carrying, lifting, house/yardwork, driving/computer work    Charges:  Timed Code Treatment Minutes: 25   Total Treatment Minutes: 45       [] EVAL - LOW (37452)   [] EVAL - MOD (05289)  [] EVAL - HIGH (93133)  [] RE-EVAL (81386)  [] OI(76441) x       [] Ionto  [] NMR (86697) x       [] Vaso  [] Manual (19367) x       [] Ultrasound  [] TA x        [] Mech Traction (80782)  [] Aquatic Therapy x     [] ES (un) (18792):   [] Home Management Training x  [] ES(attended) (69506)   [] Dry Needling 1-2 muscles (99702):  [] Dry Needling 3+ muscles (059895  [] Group:      [] Other:     GOALS:  Patient stated goal: improve L UE usage during work tasks as well as decrease N/T   [] Progressing: [] Met: [] Not Met: [] Adjusted    Therapist goals for Patient:   Short Term Goals: To be achieved in: 2 weeks  1. Independent in HEP and progression per patient tolerance, in order to prevent re-injury. [] Progressing: [] Met: [] Not Met: [] Adjusted  2. Patient will have a decrease in pain to facilitate improvement in movement, function, and ADLs as indicated by Functional Deficits. [] Progressing: [] Met: [] Not Met: [] Adjusted    Long Term Goals: To be achieved in: 8 weeks  1. FOTO score of at least - to assist with reaching prior level of function. [] Progressing: [] Met: [] Not Met: [] Adjusted  2. Patient will demonstrate increased AROM to [employer] of cervical/thoracic spine to allow for proper joint functioning as needed for full work tasks without limitations or restrictions   [] Progressing: [] Met: [] Not Met: [] Adjusted  3. Patient will demonstrate an increase in postural awareness and control and increased L  strength to within 15 lbs of R  strength to improve L UE usage during IADL's and work activity. [] Progressing: [] Met: [] Not Met: [] Adjusted  4. Patient will return to functional activities including trying shoes without increased symptoms or restriction with L hand dexterity. [] Progressing: [] Met: [] Not Met: [] Adjusted    Overall Progression Towards Functional goals/ Treatment Progress Update:  [] Patient is progressing as expected towards functional goals listed. [] Progression is slowed due to complexities/Impairments listed. [] Progression has been slowed due to co-morbidities.   [x] Plan just implemented, too soon to assess goals progression <30days   [] Goals require adjustment due to lack of progress  [] Patient is not progressing as expected and requires additional follow up with physician  [] Other    Persisting Functional Limitations/Impairments:  []Sitting []Standing   []Walking []Squatting/bending    []Stairs []ADL's    []Transfers []Reaching  []Housework []Lifting  []Driving []Job related tasks  []Sports/Recreation  []Sleeping  []Other:    ASSESSMENT:  See eval    Treatment/Activity Tolerance:  [x] Patient able to complete tx  [] Patient limited by fatique  [] Patient limited by pain   [] Patient limited by other medical complications  [] Other:     Prognosis: [] Good [] Fair  [] Poor    Patient Requires Follow-up: [x] Yes  [] No    PLAN: See eval. PT 1-2x / week for 8 weeks. [] Continue per plan of care [] Alter current plan (see comments)  [x] Plan of care initiated [] Hold pending MD visit [] Discharge    Electronically signed by: Alcides Montiel, PT DPT, OCS, OMT-C      Note: If patient does not return for scheduled/ recommended follow up visits, this note will serve as a discharge from care along with most recent update on progress.

## 2022-12-27 NOTE — PLAN OF CARE
[address] [facility], [address]  Phone: [phone]   Fax:     [phone]                                                       Physical Therapy Certification    Dear Talha Cary MD  ,    We had the pleasure of evaluating the following patient for physical therapy services at [address] [place] [address]. A summary of our findings can be found in the initial assessment below. This includes our plan of care. If you have any questions or concerns regarding these findings, please do not hesitate to contact me at the office phone number checked above. Thank you for the referral.       Physician Signature:_______________________________Date:__________________  By signing above (or electronic signature), therapists plan is approved by physician      Patient: Chalino Villegas   : 1960   MRN: [id number]  Referring Physician: Talha Cary MD        Evaluation Date: 2022      Medical Diagnosis Information:  Cervical spondylosis with radiculopathy [M47.22]   PT diagnosis: decreased cervical ROM, scapular strength, control and postural awareness contributing to persisting intermittent neurological compression contributing to decreased strength in L UE as well as pain and limitations with ADL, IADL, recreational and work activity. Insurance information: PT Insurance Information: Angie [facility] (20 visits PCY: $50 copay; auth required AFTER visit 12)    Precautions/ Contra-indications: -  Latex Allergy:  [x]NO      []YES  Preferred Language for Healthcare:   [x]English       []Other:    C-SSRS Triggered by Intake questionnaire (Past 2 wk assessment ):   [x] No, Questionnaire did not trigger screening.   [] Yes, Patient intake triggered C-SSRS Screening     [] Completed, no further action required.    [] Completed, PCP notified via Epic    SUBJECTIVE: Patient stated complaint:Pt reports having a long history of neck pain over the years with his work activity. Pt reports several months ago he was at work and felt his pain getting worse which it does and usually with seeing his DC his pain gets progressively better. After 4 visits his pain remained the same and started getting a little worse and f/u with MD for his neck as well as surgeon. Pt reports his surgeon recommended trial of JIMMIE injections and PT as his potential surgery will require front and back fixation and has not exhausted non-operative management yet. Pt states he underwent JIMMIE about 4 weeks ago which his main source of pain was eliminated. Pt reports now he has intermittent N/T from his shoulder down to his hand and almost constant N/T in his L hand and last 2 fingers. Recent EMG showing issues with cervical spine as well as cubital tunnel and carpal tunnel both of which were operated on in years past but never really got a whole lot better. Pt states about 3-4 months ago, he started having progressively worsening issues with strength and dexterity of his L hand and he would like for this to be the big focus with PT. Fear avoidance: I should not do physical activities that (might) make my pain worse   [] True   [x] False     Relevant Medical History:CT release and cubital tunnel release on L arm 2017  Functional Outcome: FOTO physical FS primary measure score = -; Risk adjusted = -    Pain Scale: 0-5/10  Easing factors: change in positioning and activity   Provocative factors: Lying on sides slee     Type: []Constant   [x]Intermittent  [x]Radiating []Localized []other:     Numbness/Tingling: constant in L last 2 digits, intermittently throughout whole arm     Occupation/School: Construction      Living Status/Prior Level of Function: Prior to this injury / incident, pt was independent with ADLs and IADLs, recreational and work activity without issues or concerns.      OBJECTIVE:   Palpation: -    Functional Mobility/Transfers: no issues noted    Posture: increased thoracic kyphosis and increased forward flexed posture     Bandages/Dressings/Incisions: n/a    Gait: (include devices/WB status): WNL     Dermatomes Normal Abnormal Comments   Top of head (C1)      Posterior occipital region (C2)      Side of neck (C3) x     Top of shoulder (C4) x     Lateral deltoid (C5) x     Tip of thumb (C6) x     Distal middle finger (C7) x     Distal fifth finger (C8)  X L    Medial forearm (T1) x     Lower extremity            CERV ROM     Cervical Flexion 53    Cervical Extension 55    Cervical SB 33 R 27 L   Cervical rotation 70 R 65 L        ROM Left Right   Shoulder Flex     Shoulder Abd     Shoulder ER     Shoulder IR               Strength / Myotomes Left Right   Cervical Flexion (C1-2)     Cervical Side-bending (C3)     Shoulder Shrug (C4)     Shoulder Flex 4+ 4+   Shoulder Scap     Shoulder Abduction (C5) 5 5   Shoulder ER 5 4+   Shoulder IR 5 5   Biceps (C6) 5 5   Triceps (C7) 4 4+   Wrist Extension (C6) 5 5   Wrist Flexion (C7) 4- 5    (avg 3 trials) 9 lbs 70 lbs   Key  (avg 3 trials) 4 lbs 17 lbs   Thumb Abduction (C8) 3+ 5   Finger Abduction (T1)         Orthopaedic Special Tests - Positive  Negative  NT Comments    Hautard's        Rhomberg       Sharps-Henrry       Cervical Torsion / Body Rotation        C2 Kick       Modified Shear       Compression       Distraction                                      [x] Patient history, allergies, meds reviewed. Medical chart reviewed. See intake form. Review Of Systems (ROS):  [x]Performed Review of systems (Integumentary, CardioPulmonary, Neurological) by intake and observation. Intake form has been scanned into medical record. Patient has been instructed to contact their primary care physician regarding ROS issues if not already being addressed at this time.       Co-morbidities/Complexities (which will affect course of rehabilitation): -  []None        []Hx of COVID   Arthritic conditions []Rheumatoid arthritis (M05.9)  []Osteoarthritis (M19.91)  []Gout   Cardiovascular conditions   []Hypertension (I10)  []Hyperlipidemia (E78.5)  []Angina pectoris (I20)  []Atherosclerosis (I70)  []Pacemaker  []Hx of CABG/stent/  cardiac surgeries   Musculoskeletal conditions   []Disc pathology   []Congenital spine pathologies   []Osteoporosis (M81.8)  []Osteopenia (M85.8)  []Scoliosis       Endocrine conditions   []Hypothyroid (E03.9)  []Hyperthyroid Gastrointestinal conditions   []Constipation (P64.90)   Metabolic conditions   []Morbid obesity (E66.01)  []Diabetes type 1(E10.65) or 2 (E11.65)   []Neuropathy (G60.9)     Cardio/Pulmonary conditions   []Asthma (J45)  []Coughing   []COPD (J44.9)  []CHF  []A-fib   Psychological Disorders  []Anxiety (F41.9)  []Depression (F32.9)   []Other:   Developmental Disorders  []Autism (F84.0)  []CP (G80)  []Down Syndrome (Q90.9)  []Developmental delay     Neurological conditions  []Prior Stroke (I69.30)  []Parkinson's (G20)  []Encephalopathy (G93.40)  []MS (G35)  []Post-polio (G14)  []SCI  []TBI  []ALS Other conditions  []Fibromyalgia (M79.7)  []Vertigo  []Syncope  []Kidney Failure  []Cancer      []currently undergoing                treatment  []Pregnancy  []Incontinence   Prior surgeries  [x]involved limb  []previous spinal surgery  [] section birth  []hysterectomy  []bowel / bladder surgery  []other relevant surgeries   []Other:              Barriers to/and or personal factors that will affect rehab potential:              []Age  []Sex   []Smoker              []Motivation/Lack of Motivation                        []Co-Morbidities              []Cognitive Function, education/learning barriers              []Environmental, home barriers              []profession/work barriers  []past PT/medical experience  []other:  Justification: prior surgical history of CTS and Cubital tunnel syndrome with repeat EMC showing continued compression/involvement potential contributing to limited rehab potential     Falls Risk Assessment (30 days): -  [x] Falls Risk assessed and no intervention required. [] Falls Risk assessed and Patient requires intervention due to being higher risk   TUG score (>12s at risk):     [] Falls education provided, including         ASSESSMENT: Pt presents to PT with decreased cervical ROM, decreased scapular and UE strength, myotomal weakness and dermatomal changes with these symptoms contributing to increased difficulty with L UE utilization with ADL, IADL, recreational and work activity. Pt will benefit from PT to work to address these issues to improve cervical and throacic spinal mobility, and UE strength and control to maximize L UE utilization during work and IADL tasks.       Functional Impairments:     [x]Noted cervical/thoracic/GHJ joint hypomobility   []Noted cervical/thoracic/GHJ joint hypermobility   [x]Decreased cervical/UE functional ROM   []Noted Headache pain aggravated by neck movements with/without dizziness   [x]Abnormal reflexes/sensation/myotomal/dermatomal deficits   []Decreased DCF control or ability to hold head up   []Decreased RC/scapular/core strength and neuromuscular control    []Decreased UE functional strength   []other:      Functional Activity Limitations (from functional questionnaire and intake)   [x]Reduced ability to tolerate prolonged functional positions   [x]Reduced ability or difficulty with changes of positions or transfers between positions   [x]Reduced ability to maintain good posture and demonstrate good body mechanics with sitting, bending, and lifting   [] Reduced ability or tolerance with driving and/or computer work   [x]Reduced ability to perform lifting, reaching, carrying tasks   []Reduced ability to concentrate   [x]Reduced ability to sleep    []Reduced ability to tolerate any impact through UE or spine   []Reduced ability to ambulate prolonged functional periods/distances   []other:    Participation Restrictions   []Reduced participation in self care activities   [x]Reduced participation in home management activities   [x]Reduced participation in work activities   [x]Reduced participation in social activities. []Reduced participation in sport/recreational activities. Classification/Subgrouping:   []signs/symptoms consistent with neck pain with mobility deficits     [x]signs/symptoms consistent with neck pain with movement coordinated impairments    []signs/symptoms consistent with neck pain with radiating pain    []signs/symptoms consistent with neck pain with headaches (cervicogenic)    [x]Signs/symptoms consistent with nerve root involvement including myotome & dermatome dysfunction   []sign/symptoms consistent with facet dysfunction of cervical and thoracic spine    []signs/symptoms consistent suggesting central cord compression/UMN syndromes   []signs/symptoms consistent with discogenic cervical pain   []signs/symptoms consistent with rib dysfunction   []signs/symptoms consistent with postural dysfunction   []signs/symptoms consistent with shoulder pathology    []signs/symptoms consistent with post-surgical status including decreased ROM, strength and function.    []signs/symptoms consistent with pathology which may benefit from Dry Needling   []signs/symptoms which may limit the use of advanced manual therapy techniques: (Hypertension, recent trauma, intolerance to end range positions, prior TIA, visual issues, UE myotomes loss )     Prognosis/Rehab Potential:      []Excellent   [x]Good    []Fair   []Poor    Tolerance of evaluation/treatment:    []Excellent   [x]Good    []Fair   []Poor    Physical Therapy Evaluation Complexity Justification  [x] A history of present problem with:  [] no personal factors and/or comorbidities that impact the plan of care;  [x]1-2 personal factors and/or comorbidities that impact the plan of care  []3 personal factors and/or comorbidities that impact the plan of care  [x] An examination of body systems using standardized tests and measures addressing any of the following: body structures and functions (impairments), activity limitations, and/or participation restrictions;:  [x] a total of 1-2 or more elements   [] a total of 3 or more elements   [] a total of 4 or more elements   [x] A clinical presentation with:  [x] stable and/or uncomplicated characteristics   [] evolving clinical presentation with changing characteristics  [] unstable and unpredictable characteristics;   [x] Clinical decision making of [x] Low-, [] moderate, [] high complexity using standardized patient assessment instrument and/or measurable assessment of functional outcome. [x] EVAL (LOW) 70726 (typically 20 minutes face-to-face)  [] EVAL (MOD) 74079 (typically 30 minutes face-to-face)  [] EVAL (HIGH) 57252 (typically 45 minutes face-to-face)  [] RE-EVAL     PLAN:   Frequency/Duration:  1-2 days per week for 8 Weeks:  Interventions:  [x]  Therapeutic exercise including: strength training, ROM, for cervical spine,scapula, core and Upper extremity, including postural re-education. [x]  NMR activation and proprioception for Deep cervical flexors, periscapular and RC muscles and Core, including postural re-education. [x]  Manual therapy as indicated for C/T spine, ribs, Soft tissue to include: Dry Needling/IASTM, STM, PROM, Gr I-IV mobilizations, manipulation. [x] Modalities as needed that may include: thermal agents, E-stim, Biofeedback, US, iontophoresis as indicated  [x] Patient education on joint protection, postural re-education, activity modification, progression of HEP. HEP instruction: Written HEP instructions provided and reviewed. GOALS:  Patient stated goal: improve L UE usage during work tasks as well as decrease N/T   [] Progressing: [] Met: [] Not Met: [] Adjusted    Therapist goals for Patient:   Short Term Goals: To be achieved in: 2 weeks  1.  Independent in HEP and progression per patient tolerance, in order to prevent re-injury. [] Progressing: [] Met: [] Not Met: [] Adjusted  2. Patient will have a decrease in pain to facilitate improvement in movement, function, and ADLs as indicated by Functional Deficits. [] Progressing: [] Met: [] Not Met: [] Adjusted    Long Term Goals: To be achieved in: 8 weeks  1. FOTO score of at least - to assist with reaching prior level of function. [] Progressing: [] Met: [] Not Met: [] Adjusted  2. Patient will demonstrate increased AROM to [facility] of cervical/thoracic spine to allow for proper joint functioning as needed for full work tasks without limitations or restrictions   [] Progressing: [] Met: [] Not Met: [] Adjusted  3. Patient will demonstrate an increase in postural awareness and control and increased L  strength to within 15 lbs of R  strength to improve L UE usage during IADL's and work activity. [] Progressing: [] Met: [] Not Met: [] Adjusted  4. Patient will return to functional activities including trying shoes without increased symptoms or restriction with L hand dexterity.    [] Progressing: [] Met: [] Not Met: [] Adjusted      Electronically signed by:  Kristan Haro PT DPT, OCS, OMT-C

## 2022-12-29 ENCOUNTER — HOSPITAL ENCOUNTER (OUTPATIENT)
Dept: INTERVENTIONAL RADIOLOGY/VASCULAR | Age: 62
Discharge: HOME | End: 2022-12-29
Payer: COMMERCIAL

## 2022-12-29 DIAGNOSIS — M54.12 BRACHIAL NEURITIS: ICD-10-CM

## 2022-12-29 PROCEDURE — 6360000004 HC RX CONTRAST MEDICATION: Performed by: RADIOLOGY

## 2022-12-29 PROCEDURE — 62321 NJX INTERLAMINAR CRV/THRC: CPT

## 2022-12-29 RX ADMIN — IOHEXOL 10 ML: 180 INJECTION INTRAVENOUS at 11:23

## 2022-12-29 NOTE — DISCHARGE INSTRUCTIONS
[facility] [employer]  Cardiovascular Special Procedures  Cervical Epidural Steroid Injection  Patient Discharge Instructions      When [address] should not drive the day of the procedure. You may experience arm weakness during the first 24 hours following the procedure. However, you do not need to stay in bed when you get home. Even if you feel better right away, avoid activities that may strain your neck. Keep in mind that most patients feel increased pain for the first 24 hours. You should start feeling some pain relief 3-7 days following the injection. This is because the steroid will start working within three days of the injection with maximal effect by one week. At that time, we will evaluate your pain level to determine the need for another steroid injection. Remove bandaid(s) within 24 hours. When to Call Your Doctor    Call right away if you notice any of the following symptoms:    Severe pain or headache;  Fever or chills; Redness or swelling around the injection site. You may contact [address] [place], [place] [address] for any questions or problems that may occur at [phone] during the hours of 9am-5pm Monday-Friday, or the hospital  after hours at [phone], to have the interventional radiologist on call paged. [facility]  Cardiovascular Special Procedures  General Discharge Instructions    PROCEDURE: ____________________________________________________    ____ Eladia Deleon may be drowsy or lightheaded after receiving sedation.  DO NOT operate a vehicle (automobile, bicycle, motorcycle, machinery, or power tools), make any important decisions or sign any important/legal documents, or drink alcoholic beverages for the next 24 hours  ____ We strongly suggest that a responsible adult be with you for the next 24 hours for your protection and safety  ____ If the intravenous catheter site is painful, apply warm wet compresses on the site until the soreness is relieved and elevate the arm above the heart. Call your physician if no improvement in 2 to 3 days    DIETARY INSTRUCTIONS:    ____ Drink extra fluids over the next 24 hours (If not contraindicated by illness or by                   physician order)  ____ Start with clear liquids and progress to normal diet as you feel like eating. If you experience nausea or repeated episodes of vomiting, which persist beyond 12-24 hours, notify your doctor        ___x_ Resume your previous diet    ACTIVITY INSTRUCTIONS:    ____ See other instructions  ____ No special instructions  ____ Rest for 24 hours    __x__ Up as tolerated  __x__ Increase activity as tolerated    Wound/Dressing Instructions:  ____ See other instructions  __x__ May shower, tomorrow  __x_ Remove bandage within 24 hours    MEDICATION INSTRUCTIONS:    __x__ See Medication Reconciliation Sheet      SPECIAL INSTRUCTIONS:  __________________________________________________________________________________________________________________________________________________________________________________________________________________________________________                                                                                                                                                                                                                                                                                                  Please make sure that you follow-up with your doctors office for your results.

## 2023-01-04 ENCOUNTER — HOSPITAL ENCOUNTER (OUTPATIENT)
Dept: PHYSICAL THERAPY | Age: 63
Setting detail: THERAPIES SERIES
Discharge: HOME | End: 2023-01-04
Payer: COMMERCIAL

## 2023-01-04 PROCEDURE — 97140 MANUAL THERAPY 1/> REGIONS: CPT

## 2023-01-04 PROCEDURE — 97110 THERAPEUTIC EXERCISES: CPT

## 2023-01-04 NOTE — FLOWSHEET NOTE
[address]  Phone: [phone]   Fax: [phone]    Physical Therapy Daily Treatment Note    Date:  2023     Patient Name:  Antione Heaton    :  1960  MRN: [id number]  Medical Diagnosis:  Cervical spondylosis with radiculopathy [M47.22]  Treatment Diagnosis: decreased cervical ROM, scapular strength, control and postural awareness contributing to persisting intermittent neurological compression contributing to decreased strength in L UE as well as pain and limitations with ADL, IADL, recreational and work activity. Insurance/Certification information:  PT Insurance Information: [employer] (20 visits PCY: $50 copay; auth required AFTER visit 12)  Physician Information:  Heydi Russell MD    Plan of care signed (Y/N): []  Yes [x]  No     Date of Patient follow up with Physician:      Progress Report: []  Yes  [x]  No     Date Range for reporting period:  Beginnin2022  Ending:     Progress report due (10 Rx/or 30 days whichever is less): visit #10 or  (date)     Recertification due (POC duration/ or 90 days whichever is less): visit #- or  (date)     Visit # Insurance Allowable Auth required? Date Range   2 20 [x]  Yes - AFTER VISIT 12  []  No -       Units approved Units used Date Range   - - -     Latex Allergy:  [x]NO      []YES  Preferred Language for Healthcare:   [x]English       []other:    Functional Scale:           Date assessed:  FOTO physical FS primary measure score = -; risk adjusted = -  PLEASE GET NEXT SESSION     Pain level:  0/10     SUBJECTIVE: He reports weakness persist in left hand. He had 2nd epidural last week, and feels like this was better than the first (as the first epidural increased arm pain and prevented him from lying down to sleep). He has absolutely no pain now.      OBJECTIVE:   Observation:   Test measurements:   - Median / Ulnar ULTT + for tension, discomfort (not pain) on LUE, all negative R    RESTRICTIONS/PRECAUTIONS: Carpal tunnel and cubital tunnel release in 2017; recent EMG showing continued issues at C8-T1, cubital tunnel and median nerve at carpal tunnel; Recent JIMMIE in cervical spine    Exercises/Interventions:   Therapeutic Exercise (48363) Resistance / level Sets/sec Reps Notes   Wrist flex/ext with wt Gripping putty Pincer  and pull Pad to pad pinch and pull Clothespins:  Pad to pad  One-cher  Two-cher     20x  20x  20x    Tabletop to Clawhand   10x    Flexbar:  Supination  Pronation  Wrist Extension  Wrist Flexion       25x  25x  25x  25x    Rubber band:  1st/2nd finger abduction none  14  10  10x  10x  10x             Therapeutic Activities (25485)                                                 NMR re-education (13667)                                          Manual Intervention (96824)       Cerv mobs:  Sideglides L to R  Cervical traction  Cervical PA   C5-T1    C2-T1   G3-4   10x ea  4 x 10''  8x ea    Thoracic mobs/manip       CT manip       Rib mobilizations       STM       Median/ulnar nerve glides   10x L, ea      Modalities:     Patient education:  -pt educated on diagnosis, prognosis and expectations for rehab  -all pt questions were answered    Home Exercise Program:  1/4 - provided rubber bands for finger abduction; recommended tabletop to clawhand, wrist flex/ext/sup/pron with weight, pad to pad contact (thumb through 5th digit), gripping (with ulnar and median biases). Access Code: [email or web address]  URL: [email or web address]  Date: 12/27/2022  Prepared by: Jesus Ross    Exercises  Seated Wrist Flexion with Dumbbell - 1 x daily - 7 x weekly - 3 sets - 10 reps  Seated Wrist Extension with Dumbbell - 1 x daily - 7 x weekly - 3 sets - 10 reps  Tip Pinch with Putty - 1 x daily - 7 x weekly - 10 reps  Key Pinch with Putty - 1 x daily - 7 x weekly - 20 reps  Thumb Opposition with Putty - 1 x daily - 7 x weekly - 20 reps      Therapeutic Exercise and NMR EXR  [x] (62749) Provided verbal/tactile cueing for activities related to strengthening, flexibility, endurance, ROM  for improvements in cervical, postural, scapular, scapulothoracic and UE control with self care, reaching, carrying, lifting, house/yardwork, driving/computer work.    [] (67485) Provided verbal/tactile cueing for activities related to improving balance, coordination, kinesthetic sense, posture, motor skill, proprioception  to assist with cervical, scapular, scapulothoracic and UE control with self care, reaching, carrying, lifting, house/yardwork, driving/computer work.  [] (55872) Therapist is in constant attendance of 2 or more patients providing skilled therapy interventions, but not providing any significant amount of measurable one-on-one time to either patient, for improvements in cervical, scapular, scapulothoracic and UE control with self care, reaching, carrying, lifting, house/yardwork, driving, computer work. Therapeutic Activities:    [] (43902 or 75024) Provided verbal/tactile cueing for activities related to improving balance, coordination, kinesthetic sense, posture, motor skill, proprioception and motor activation to allow for proper function of cervical, scapular, scapulothoracic and UE control with self care, carrying, lifting, driving/computer work.      Home Exercise Program:    [] (42180) Reviewed/Progressed HEP activities related to strengthening, flexibility, endurance, ROM of cervical, scapular, scapulothoracic and UE control with self care, reaching, carrying, lifting, house/yardwork, driving/computer work  [] (66756) Reviewed/Progressed HEP activities related to improving balance, coordination, kinesthetic sense, posture, motor skill, proprioception of cervical, scapular, scapulothoracic and UE control with self care, reaching, carrying, lifting, house/yardwork, driving/computer work      Manual Treatments:  PROM / STM / Oscillations-Mobs:  G-I, II, III, IV (PA's, Inf., Post.)  [x] (11387) Provided manual therapy to mobilize soft tissue/joints of cervical/CT, scapular GHJ and UE for the purpose of decreasing headache, modulating pain, promoting relaxation,  increasing ROM, reducing/eliminating soft tissue swelling/inflammation/restriction, improving soft tissue extensibility and allowing for proper ROM for normal function with self care, reaching, carrying, lifting, house/yardwork, driving/computer work    Charges:  Timed Code Treatment Minutes: 40   Total Treatment Minutes: 40       [] EVAL - LOW (05829)   [] EVAL - MOD (92277)  [] EVAL - HIGH (52866)  [] RE-EVAL (88410)  [x] TE(01074) x 2       [] Ionto  [] NMR (11605) x       [] Vaso  [x] Manual (51902) x 1      [] Ultrasound  [] TA x        [] Mech Traction (58653)  [] Aquatic Therapy x     [] ES (un) (38179):   [] Home Management Training x  [] ES(attended) (59269)   [] Dry Needling 1-2 muscles (14972):  [] Dry Needling 3+ muscles (574708  [] Group:      [] Other:     GOALS:  Patient stated goal: improve L UE usage during work tasks as well as decrease N/T   [] Progressing: [] Met: [] Not Met: [] Adjusted    Therapist goals for Patient:   Short Term Goals: To be achieved in: 2 weeks  1. Independent in HEP and progression per patient tolerance, in order to prevent re-injury. [] Progressing: [] Met: [] Not Met: [] Adjusted  2. Patient will have a decrease in pain to facilitate improvement in movement, function, and ADLs as indicated by Functional Deficits. [] Progressing: [] Met: [] Not Met: [] Adjusted    Long Term Goals: To be achieved in: 8 weeks  1. FOTO score of at least - to assist with reaching prior level of function. [] Progressing: [] Met: [] Not Met: [] Adjusted  2. Patient will demonstrate increased AROM to [facility] of cervical/thoracic spine to allow for proper joint functioning as needed for full work tasks without limitations or restrictions   [] Progressing: [] Met: [] Not Met: [] Adjusted  3.  Patient will demonstrate an increase in postural awareness and control and increased L  strength to within 15 lbs of R  strength to improve L UE usage during IADL's and work activity. [] Progressing: [] Met: [] Not Met: [] Adjusted  4. Patient will return to functional activities including trying shoes without increased symptoms or restriction with L hand dexterity. [] Progressing: [] Met: [] Not Met: [] Adjusted    Overall Progression Towards Functional goals/ Treatment Progress Update:  [] Patient is progressing as expected towards functional goals listed. [] Progression is slowed due to complexities/Impairments listed. [] Progression has been slowed due to co-morbidities. [x] Plan just implemented, too soon to assess goals progression <30days   [] Goals require adjustment due to lack of progress  [] Patient is not progressing as expected and requires additional follow up with physician  [] Other    Persisting Functional Limitations/Impairments:  []Sitting []Standing   []Walking []Squatting/bending    []Stairs []ADL's    []Transfers []Reaching  []Housework []Lifting  []Driving []Job related tasks  []Sports/Recreation  []Sleeping  []Other:    ASSESSMENT:  The patient's sensation and motor function are reduced in left C7-T1 distribution. Today's focus was on improving cervical mobility, upper limb neural mobility, and improving motor control/coordination. The patient fatigued rapidly with manual coordination, strength and endurance, marked by elbow/shoulder compensation for wrist and hand activity. Treatment/Activity Tolerance:  [x] Patient able to complete tx  [] Patient limited by fatique  [] Patient limited by pain   [] Patient limited by other medical complications  [] Other:     Prognosis: [] Good [] Fair  [] Poor    Patient Requires Follow-up: [x] Yes  [] No    PLAN: See eval. PT 1-2x / week for 8 weeks.    [] Continue per plan of care [] Alter current plan (see comments)  [x] Plan of care initiated [] Hold pending MD visit [] Discharge    Electronically signed by: Yoni Carter, PT DPT, OMT-C      Note: If patient does not return for scheduled/ recommended follow up visits, this note will serve as a discharge from care along with most recent update on progress.

## 2023-01-06 ENCOUNTER — HOSPITAL ENCOUNTER (OUTPATIENT)
Dept: PHYSICAL THERAPY | Age: 63
Setting detail: THERAPIES SERIES
Discharge: HOME | End: 2023-01-06
Payer: COMMERCIAL

## 2023-01-06 PROCEDURE — 97110 THERAPEUTIC EXERCISES: CPT

## 2023-01-06 PROCEDURE — 97112 NEUROMUSCULAR REEDUCATION: CPT

## 2023-01-06 NOTE — FLOWSHEET NOTE
[address]  Phone: [phone]   Fax: [phone]    Physical Therapy Daily Treatment Note    Date:  2023     Patient Name:  Deann Davies    :  1960  MRN: [id number]  Medical Diagnosis:  Cervical spondylosis with radiculopathy [M47.22]  Treatment Diagnosis: decreased cervical ROM, scapular strength, control and postural awareness contributing to persisting intermittent neurological compression contributing to decreased strength in L UE as well as pain and limitations with ADL, IADL, recreational and work activity. Insurance/Certification information:  PT Insurance Information: [facility] (20 visits PCY: $50 copay; auth required AFTER visit 12)  Physician Information:  Melania Brown MD    Plan of care signed (Y/N): []  Yes [x]  No     Date of Patient follow up with Physician:      Progress Report: []  Yes  [x]  No     Date Range for reporting period:  Beginnin2022  Ending:     Progress report due (10 Rx/or 30 days whichever is less): visit #10 or 3/07 (date)     Recertification due (POC duration/ or 90 days whichever is less): visit #- or  (date)     Visit # Insurance Allowable Auth required? Date Range   3 20 [x]  Yes - AFTER VISIT 12  []  No -       Units approved Units used Date Range   - - -     Latex Allergy:  [x]NO      []YES  Preferred Language for Healthcare:   [x]English       []other:    Functional Scale:           Date assessed:  FOTO physical FS primary measure score = 48; risk adjusted = -  23    Pain level:  0/10     SUBJECTIVE: Weakness is primary complaint. Little to no N&T.  Denies pain    OBJECTIVE:   Observation:   Test measurements:   - Median / Ulnar ULTT + for tension, discomfort (not pain) on LUE, all negative R    RESTRICTIONS/PRECAUTIONS: Carpal tunnel and cubital tunnel release in 2017; recent EMG showing continued issues at C8-T1, cubital tunnel and median nerve at carpal tunnel; Recent JIMMIE in cervical spine    Exercises/Interventions:   Therapeutic Exercise (52398) Resistance / level Sets/sec Reps Notes   Wrist flex/ext with wt Gripping putty Pincer  and pull Pad to pad pinch and pull Clothespins:  Pad to pad  One-cher  Two-cher   Tabletop to Clawhand   15x 1/6 - ^ reps   Flexbar:  Supination  Pronation  Wrist Extension  Wrist Flexion     Green  Green  Green  green    25x  25x  25x  25x   1/6 - progressed resistance   Rubber band:  1st/2nd finger abduction  4th/5th finger abduction  2nd/3rd finger abduction   10 gauge  10 gauge  10 gauge    15x  15x  15x   1/6   UBE 2'/'2  RPM 60   1/6   Wrist Radial Deviation 3# 2 15 1/6   Forearm Supination/Pronation (4th/5th digits & thumb holding firmly) 3# 2 15 1/6          Therapeutic Activities (91377)                                                 NMR re-education (27016)       Digiflex - digital progression    Digiflex - gripping Red R  Yellow L       5 5x      5 1/6 - \"trumpet fingers\"       Powerweb:  Splayed Fingers to one point    One point to splayed fingers     Green    Green   1    1   15    15 1/6   Pincer  Velcro Cubes:  Remove & Place White - pincer on block    Black - pincer on tube  15, ea        15 ea 1/6                 Manual Intervention (95972)       Cerv mobs:  Sideglides L to R  Cervical traction  Cervical PA Thoracic mobs/manip       CT manip       Rib mobilizations       STM       Median/ulnar nerve glides    Modalities:     Patient education:  -pt educated on diagnosis, prognosis and expectations for rehab  -all pt questions were answered    Home Exercise Program:  1/4 - provided rubber bands for finger abduction; recommended tabletop to clawhand, wrist flex/ext/sup/pron with weight, pad to pad contact (thumb through 5th digit), gripping (with ulnar and median biases). Access Code: [email or web address]  URL: [email or web address]  Date: 12/27/2022  Prepared by: Dima Gomez    Exercises  Seated Wrist Flexion with Dumbbell - 1 x daily - 7 x weekly - 3 sets - 10 reps  Seated Wrist Extension with Dumbbell - 1 x daily - 7 x weekly - 3 sets - 10 reps  Tip Pinch with Putty - 1 x daily - 7 x weekly - 10 reps  Key Pinch with Putty - 1 x daily - 7 x weekly - 20 reps  Thumb Opposition with Putty - 1 x daily - 7 x weekly - 20 reps      Therapeutic Exercise and NMR EXR  [x] (66531) Provided verbal/tactile cueing for activities related to strengthening, flexibility, endurance, ROM  for improvements in cervical, postural, scapular, scapulothoracic and UE control with self care, reaching, carrying, lifting, house/yardwork, driving/computer work. [x] (72344) Provided verbal/tactile cueing for activities related to improving balance, coordination, kinesthetic sense, posture, motor skill, proprioception  to assist with cervical, scapular, scapulothoracic and UE control with self care, reaching, carrying, lifting, house/yardwork, driving/computer work.  [] (24944) Therapist is in constant attendance of 2 or more patients providing skilled therapy interventions, but not providing any significant amount of measurable one-on-one time to either patient, for improvements in cervical, scapular, scapulothoracic and UE control with self care, reaching, carrying, lifting, house/yardwork, driving, computer work. Therapeutic Activities:    [] (28261 or 44787) Provided verbal/tactile cueing for activities related to improving balance, coordination, kinesthetic sense, posture, motor skill, proprioception and motor activation to allow for proper function of cervical, scapular, scapulothoracic and UE control with self care, carrying, lifting, driving/computer work.      Home Exercise Program:    [] (13060) Reviewed/Progressed HEP activities related to strengthening, flexibility, endurance, ROM of cervical, scapular, scapulothoracic and UE control with self care, reaching, carrying, lifting, house/yardwork, driving/computer work  [] (17729) Reviewed/Progressed HEP activities related to improving balance, coordination, kinesthetic sense, posture, motor skill, proprioception of cervical, scapular, scapulothoracic and UE control with self care, reaching, carrying, lifting, house/yardwork, driving/computer work      Manual Treatments:  PROM / STM / Oscillations-Mobs:  G-I, II, III, IV (PA's, Inf., Post.)  [] (89927) Provided manual therapy to mobilize soft tissue/joints of cervical/CT, scapular GHJ and UE for the purpose of decreasing headache, modulating pain, promoting relaxation,  increasing ROM, reducing/eliminating soft tissue swelling/inflammation/restriction, improving soft tissue extensibility and allowing for proper ROM for normal function with self care, reaching, carrying, lifting, house/yardwork, driving/computer work    Charges:  Timed Code Treatment Minutes: 45   Total Treatment Minutes: 45     [] EVAL - LOW (45607)   [] EVAL - MOD (99053)  [] EVAL - HIGH (63946)  [] RE-EVAL (84440)  [x] LM(36828) x 2       [] Ionto  [x] NMR (06788) x 1       [] Vaso  [] Manual (16623) x      [] Ultrasound  [] TA x        [] Mech Traction (90497)  [] Aquatic Therapy x     [] ES (un) (24547):   [] Home Management Training x  [] ES(attended) (15226)   [] Dry Needling 1-2 muscles (25130):  [] Dry Needling 3+ muscles (129384  [] Group:      [] Other:     GOALS:  Patient stated goal: improve L UE usage during work tasks as well as decrease N/T   [] Progressing: [] Met: [] Not Met: [] Adjusted    Therapist goals for Patient:   Short Term Goals: To be achieved in: 2 weeks  1. Independent in HEP and progression per patient tolerance, in order to prevent re-injury. [] Progressing: [] Met: [] Not Met: [] Adjusted  2. Patient will have a decrease in pain to facilitate improvement in movement, function, and ADLs as indicated by Functional Deficits. [] Progressing: [] Met: [] Not Met: [] Adjusted    Long Term Goals: To be achieved in: 8 weeks  1.  FOTO score of at least - to assist with reaching prior level of function. [] Progressing: [] Met: [] Not Met: [] Adjusted  2. Patient will demonstrate increased AROM to [facility] of cervical/thoracic spine to allow for proper joint functioning as needed for full work tasks without limitations or restrictions   [] Progressing: [] Met: [] Not Met: [] Adjusted  3. Patient will demonstrate an increase in postural awareness and control and increased L  strength to within 15 lbs of R  strength to improve L UE usage during IADL's and work activity. [] Progressing: [] Met: [] Not Met: [] Adjusted  4. Patient will return to functional activities including trying shoes without increased symptoms or restriction with L hand dexterity. [] Progressing: [] Met: [] Not Met: [] Adjusted    Overall Progression Towards Functional goals/ Treatment Progress Update:  [] Patient is progressing as expected towards functional goals listed. [] Progression is slowed due to complexities/Impairments listed. [] Progression has been slowed due to co-morbidities. [x] Plan just implemented, too soon to assess goals progression <30days   [] Goals require adjustment due to lack of progress  [] Patient is not progressing as expected and requires additional follow up with physician  [] Other    Persisting Functional Limitations/Impairments:  []Sitting []Standing   []Walking []Squatting/bending    []Stairs []ADL's    []Transfers []Reaching  []Housework []Lifting  []Driving []Job related tasks  []Sports/Recreation  []Sleeping  []Other:    ASSESSMENT:  The patient was challenged by additional dexterity, wrist and hand exercises and NMR this date. Fatigue rapidly with manual tasks. Ongoing focus on intrinsics, wrist/hand long tendons, ulnar-innervated musculature.     Treatment/Activity Tolerance:  [x] Patient able to complete tx  [] Patient limited by fatique  [] Patient limited by pain   [] Patient limited by other medical complications  [] Other:     Prognosis: [] Good [] Fair  [] Poor    Patient Requires Follow-up: [x] Yes  [] No    PLAN: See eval. PT 1-2x / week for 8 weeks. [] Continue per plan of care [] Alter current plan (see comments)  [x] Plan of care initiated [] Hold pending MD visit [] Discharge    Electronically signed by: Rachelle Hawkins, PT DPT, OMT-C      Note: If patient does not return for scheduled/ recommended follow up visits, this note will serve as a discharge from care along with most recent update on progress.

## 2023-01-09 ENCOUNTER — HOSPITAL ENCOUNTER (OUTPATIENT)
Dept: PHYSICAL THERAPY | Age: 63
Setting detail: THERAPIES SERIES
Discharge: HOME | End: 2023-01-09
Payer: COMMERCIAL

## 2023-01-09 PROCEDURE — 97112 NEUROMUSCULAR REEDUCATION: CPT

## 2023-01-09 PROCEDURE — 97110 THERAPEUTIC EXERCISES: CPT

## 2023-01-09 PROCEDURE — 97140 MANUAL THERAPY 1/> REGIONS: CPT

## 2023-01-09 NOTE — FLOWSHEET NOTE
[facility] - Outpatient Physical Therapy, [place]  Phone: [phone]   Fax: [phone]    Physical Therapy Daily Treatment Note    Date:  2023     Patient Name:  Taco Kerr    :  1960  MRN: [id number]  Medical Diagnosis:  Cervical spondylosis with radiculopathy [M47.22]  Treatment Diagnosis: decreased cervical ROM, scapular strength, control and postural awareness contributing to persisting intermittent neurological compression contributing to decreased strength in L UE as well as pain and limitations with ADL, IADL, recreational and work activity.   Insurance/Certification information:  PT Insurance Information: Juan Carlos (20 visits PCY: $50 copay; auth required AFTER visit 12)  Physician Information:  Julian Leonard MD    Plan of care signed (Y/N): [x]  Yes []  No     Date of Patient follow up with Physician:      Progress Report: []  Yes  [x]  No     Date Range for reporting period:  Beginnin2022  Ending:     Progress report due (10 Rx/or 30 days whichever is less): visit #10 or  (date)     Recertification due (POC duration/ or 90 days whichever is less): visit #- or  (date)     Visit # Insurance Allowable Auth required? Date Range   4 20 [x]  Yes - AFTER VISIT 12  []  No -       Units approved Units used Date Range   - - -     Latex Allergy:  [x]NO      []YES  Preferred Language for Healthcare:   [x]English       []other:    Functional Scale:           Date assessed:  FOTO physical FS primary measure score = 48     23    Pain level:  0/10     SUBJECTIVE: The patient reports he had a twinge of pain in left scapula over the weekend, he attributes it to how he was lying in bed - but reports levels of symptoms are reduced to a very low level today. Still dealing with weakness in hand.    OBJECTIVE:   Observation:   Test measurements:     -  (in #): R: 90, 98, 90, L: 18, 19, 18; Thumb Pad Pinch: R: 19, 18, 19, L: 5, 4, 5  1/ - Median / Ulnar ULTT +  for tension, discomfort (not pain) on LUE, all negative R    RESTRICTIONS/PRECAUTIONS: Carpal tunnel and cubital tunnel release in 2017; recent EMG showing continued issues at C8-T1, cubital tunnel and median nerve at carpal tunnel; Recent JIMMIE in cervical spine    Exercises/Interventions:   Therapeutic Exercise (15474) Resistance / level Sets/sec Reps Notes   Wrist flex/ext with wt Gripping putty Pincer  and pull Pad to pad pinch and pull Clothespins:  Pad to pad  One-cher  Two-cher   Tabletop to Clawhand   15x 1/6 - ^ reps   Flexbar:  Supination  Pronation  Wrist Extension  Wrist Flexion     Green  Green  Green  green    25x  25x  25x  25x   Rubber band:  1st/2nd finger abduction  4th/5th finger abduction  2nd/3rd finger abduction  1st/Thumb abduction   10 gauge  10 gauge  10 gauge  10 gauge    15x  15x  15x  15x   1/6   UBE \"Mr. Alvaradoer\" - up Emilia Topete 5#  3x 1/9   \"Hammer\":  supination/pronation  RD/UD 2 weights   1  1   15  15 1/9 - replaced weight pron/sup          Therapeutic Activities (37290)                                                 NMR re-education (68035)       Digiflex - digital progression    Digiflex - gripping Powerweb:  Splayed Fingers to one point    One point to splayed fingers     Green    Green   1    1   20    20 1/6   Pincer  Velcro Cubes:  Remove & Place Finger Adduction Isometric:  Thumb/1st  1st/2nd  2nd/3rd  3rd/4th  4th/5th     10x  10x  10x  10x  10x 1/9          Manual Intervention (85926) - 13'       Cerv mobs:  Sideglides L to R, R to L  Cervical traction  Cervical PA   C5-T1    C2-T1   G3-4   10x ea  4 x 10''  1/9 - edu/guidance for cervical traction at home, seated > standing, pull down manually vs squatting, 20-30'' H, 10'' Rest, start with 5 min, increase to 8-10 PRN   Thoracic mobs/manip       CT manip       Rib mobilizations       STM       Median/ulnar nerve glides   15x L, ea      Modalities:     Patient education:  -pt educated on diagnosis, prognosis and expectations for rehab  -all pt questions were answered    Home Exercise Program:  Access Code: [email or web address]  URL: [email or web address]  Date: 01/09/2023  Prepared by: Natasha Case    Exercises  Seated Wrist Flexion with Dumbbell - 1 x daily - 7 x weekly - 3 sets - 10 reps  Seated Wrist Extension with Dumbbell - 1 x daily - 7 x weekly - 3 sets - 10 reps  Tip Pinch with Putty - 1 x daily - 7 x weekly - 10 reps  Key Pinch with Putty - 1 x daily - 7 x weekly - 20 reps  Thumb Opposition with Putty - 1 x daily - 7 x weekly - 20 reps  Seated Finger Adduction with Putty - 1 x daily - 7 x weekly - 2 sets - 10 reps  Ulnar Nerve Flossing - 2 x daily - 7 x weekly - 2 sets - 10 reps  Median Nerve Flossing - Tray - 2 x daily - 7 x weekly - 1 sets - 10 reps  Wrist Tendon Gliding - 1 x daily - 7 x weekly - 3 sets - 10 reps  Resisted Finger Abduction - Index and Middle - 1 x daily - 7 x weekly - 2 sets - 15 reps  Resisted Finger Abduction - Index and Small - 1 x daily - 7 x weekly - 2 sets - 15 reps    1/4 - provided rubber bands for finger abduction; recommended tabletop to clawhand, wrist flex/ext/sup/pron with weight, pad to pad contact (thumb through 5th digit), gripping (with ulnar and median biases). Access Code: [email or web address]  URL: [email or web address]  Date: 12/27/2022  Prepared by: Tasneem Hare    Exercises  Seated Wrist Flexion with Dumbbell - 1 x daily - 7 x weekly - 3 sets - 10 reps  Seated Wrist Extension with Dumbbell - 1 x daily - 7 x weekly - 3 sets - 10 reps  Tip Pinch with Putty - 1 x daily - 7 x weekly - 10 reps  Key Pinch with Putty - 1 x daily - 7 x weekly - 20 reps  Thumb Opposition with Putty - 1 x daily - 7 x weekly - 20 reps    Therapeutic Exercise and NMR EXR  [x] (20623) Provided verbal/tactile cueing for activities related to strengthening, flexibility, endurance, ROM  for improvements in cervical, postural, scapular, scapulothoracic and UE control with self care, reaching, carrying, lifting, house/yardwork, driving/computer work. [x] (79038) Provided verbal/tactile cueing for activities related to improving balance, coordination, kinesthetic sense, posture, motor skill, proprioception  to assist with cervical, scapular, scapulothoracic and UE control with self care, reaching, carrying, lifting, house/yardwork, driving/computer work.  [] (58308) Therapist is in constant attendance of 2 or more patients providing skilled therapy interventions, but not providing any significant amount of measurable one-on-one time to either patient, for improvements in cervical, scapular, scapulothoracic and UE control with self care, reaching, carrying, lifting, house/yardwork, driving, computer work. Therapeutic Activities:    [] (03048 or 25902) Provided verbal/tactile cueing for activities related to improving balance, coordination, kinesthetic sense, posture, motor skill, proprioception and motor activation to allow for proper function of cervical, scapular, scapulothoracic and UE control with self care, carrying, lifting, driving/computer work.      Home Exercise Program:    [x] (67814) Reviewed/Progressed HEP activities related to strengthening, flexibility, endurance, ROM of cervical, scapular, scapulothoracic and UE control with self care, reaching, carrying, lifting, house/yardwork, driving/computer work  [x] (54502) Reviewed/Progressed HEP activities related to improving balance, coordination, kinesthetic sense, posture, motor skill, proprioception of cervical, scapular, scapulothoracic and UE control with self care, reaching, carrying, lifting, house/yardwork, driving/computer work      Manual Treatments:  PROM / STM / Oscillations-Mobs:  G-I, II, III, IV (PA's, Inf., Post.)  [x] (50808) Provided manual therapy to mobilize soft tissue/joints of cervical/CT, scapular GHJ and UE for the purpose of decreasing headache, modulating pain, promoting relaxation,  increasing ROM, reducing/eliminating soft tissue swelling/inflammation/restriction, improving soft tissue extensibility and allowing for proper ROM for normal function with self care, reaching, carrying, lifting, house/yardwork, driving/computer work    Charges:  Timed Code Treatment Minutes: 50   Total Treatment Minutes: 50     [] EVAL - LOW (13153)   [] EVAL - MOD (24581)  [] EVAL - HIGH (46537)  [] RE-EVAL (98802)  [x] PX(56186) x 1       [] Ionto  [x] NMR (41495) x 1       [] Vaso  [x] Manual (60136) x 1     [] Ultrasound  [] TA x        [] Mech Traction (59884)  [] Aquatic Therapy x     [] ES (un) (40696):   [] Home Management Training x  [] ES(attended) (03395)   [] Dry Needling 1-2 muscles (65269):  [] Dry Needling 3+ muscles (244518  [] Group:      [] Other:     GOALS:  Patient stated goal: improve L UE usage during work tasks as well as decrease N/T   [] Progressing: [] Met: [] Not Met: [] Adjusted    Therapist goals for Patient:   Short Term Goals: To be achieved in: 2 weeks  1. Independent in HEP and progression per patient tolerance, in order to prevent re-injury. [] Progressing: [] Met: [] Not Met: [] Adjusted  2. Patient will have a decrease in pain to facilitate improvement in movement, function, and ADLs as indicated by Functional Deficits. [] Progressing: [] Met: [] Not Met: [] Adjusted    Long Term Goals: To be achieved in: 8 weeks  1. FOTO score of at least - to assist with reaching prior level of function. [] Progressing: [] Met: [] Not Met: [] Adjusted  2. Patient will demonstrate increased [facility] to [facility] of cervical/thoracic spine to allow for proper joint functioning as needed for full work tasks without limitations or restrictions   [] Progressing: [] Met: [] Not Met: [] Adjusted  3. Patient will demonstrate an increase in postural awareness and control and increased L  strength to within 15 lbs of R  strength to improve L UE usage during IADL's and work activity. [] Progressing: [] Met: [] Not Met: [] Adjusted  4.  Patient will return to functional activities including trying shoes without increased symptoms or restriction with L hand dexterity. [] Progressing: [] Met: [] Not Met: [] Adjusted    Overall Progression Towards Functional goals/ Treatment Progress Update:  [] Patient is progressing as expected towards functional goals listed. [] Progression is slowed due to complexities/Impairments listed. [] Progression has been slowed due to co-morbidities. [x] Plan just implemented, too soon to assess goals progression <30days   [] Goals require adjustment due to lack of progress  [] Patient is not progressing as expected and requires additional follow up with physician  [] Other    Persisting Functional Limitations/Impairments:  []Sitting []Standing   []Walking []Squatting/bending    []Stairs []ADL's    []Transfers []Reaching  []Housework []Lifting  []Driving []Job related tasks  []Sports/Recreation  []Sleeping  []Other:    ASSESSMENT:  Updated HEP to include neural mobility, other intrinsic hand strengthening. The patient was OK'd to resume cervical traction with additional guidelines for tx. The patient continues to lack strength in thenar, hypothenar musculature, as well as fine dexterity of left hand. Neutral mobilizations progressed manually this date, and improved patient tolerance to neural tension with reduced reports of numbness. [Patient still fatigues rapidly with manual tasks. Ongoing focus on intrinsics, wrist/hand long tendons, ulnar-innervated musculature. Treatment/Activity Tolerance:  [x] Patient able to complete tx  [] Patient limited by fatique  [] Patient limited by pain   [] Patient limited by other medical complications  [] Other:     Prognosis: [x] Good [] Fair  [] Poor    Patient Requires Follow-up: [x] Yes  [] No    PLAN: See eval. PT 1-2x / week for 8 weeks.    [x] Continue per plan of care [] Alter current plan (see comments)  [] Plan of care initiated [] Hold pending MD visit [] Discharge    Electronically signed by: Cornelius Liu, PT DPT, OMT-C      Note: If patient does not return for scheduled/ recommended follow up visits, this note will serve as a discharge from care along with most recent update on progress.

## 2023-01-11 ENCOUNTER — HOSPITAL ENCOUNTER (OUTPATIENT)
Dept: PHYSICAL THERAPY | Age: 63
Setting detail: THERAPIES SERIES
Discharge: HOME | End: 2023-01-11
Payer: COMMERCIAL

## 2023-01-11 PROCEDURE — 97112 NEUROMUSCULAR REEDUCATION: CPT

## 2023-01-11 PROCEDURE — 97110 THERAPEUTIC EXERCISES: CPT

## 2023-01-11 NOTE — FLOWSHEET NOTE
[address]  Phone: [phone]   Fax: [phone]    Physical Therapy Daily Treatment Note    Date:  2023     Patient Name:  Beatriz Mcginnis    :  1960  MRN: [id number]  Medical Diagnosis:  Cervical spondylosis with radiculopathy [M47.22]  Treatment Diagnosis: decreased cervical ROM, scapular strength, control and postural awareness contributing to persisting intermittent neurological compression contributing to decreased strength in L UE as well as pain and limitations with ADL, IADL, recreational and work activity. Insurance/Certification information:  PT Insurance Information: Raymond Bose (20 visits PCY: $50 copay; auth required AFTER visit 12)  Physician Information:  Alisson Antony MD    Plan of care signed (Y/N): [x]  Yes []  No     Date of Patient follow up with Physician:      Progress Report: []  Yes  [x]  No     Date Range for reporting period:  Beginnin2022  Ending:     Progress report due (10 Rx/or 30 days whichever is less): visit #10 or  (date)     Recertification due (POC duration/ or 90 days whichever is less): visit #- or  (date)     Visit # Insurance Allowable Auth required? Date Range   5 20 [x]  Yes - AFTER VISIT 12  []  No -       Units approved Units used Date Range   - - -     Latex Allergy:  [x]NO      []YES  Preferred Language for Healthcare:   [x]English       []other:    Functional Scale:           Date assessed:  FOTO physical FS primary measure score = 48     23    Pain level:  0/10     SUBJECTIVE: Fewer zings in left periscapular. He reports N&T in left hand, specifically 4th and 5th digits. Weakness in hand persists. He reports he is still weak in the hand. He is noticing marginal improvement in opening cardoor, turning doorknobs and opening pop bottles. He is still active at work and trying to utilize left hand as much able.     OBJECTIVE:   Observation:   Test measurements:     -  (in #): R: 90, 98, 90, L: 18, 19, 18; Thumb Pad Pinch: R: 19, 18, 19, L: 5, 4, 5  1/4 - Median / Ulnar ULTT + for tension, discomfort (not pain) on LUE, all negative R    RESTRICTIONS/PRECAUTIONS: Carpal tunnel and cubital tunnel release in 2017; recent EMG showing continued issues at C8-T1, cubital tunnel and median nerve at carpal tunnel; Recent JIMMIE in cervical spine    Exercises/Interventions:   Therapeutic Exercise (22484) Resistance / level Sets/sec Reps Notes   Wrist flex/ext with wt Gripping putty Pincer  and pull Pad to pad pinch and pull Clothespins:  Pad to pad  One-cher  Two-cher   Tabletop to Clawhand   15x 1/6 - ^ reps   Flexbar:  Supination  Pronation  Wrist Extension  Wrist Flexion     Green  Green  Green  green    25x  25x  25x  25x   Rubber band:  1st/2nd finger abduction  4th/5th finger abduction  2nd/3rd finger abduction  1st/Thumb abduction UBE \"Mr.  \" - up Lenin Farhat  - palms up / down 5#  3x palm down  2x palm up 1/11 - palms up/down   \"Hammer\":  supination/pronation  RD/UD 2 weights   1  1   15  15 1/9 - replaced weight pron/sup   Push-Ups - Incline on Table  1 15 1/11   Juxtapositioner   3x through 1/11 - elbow at 90deg flex   Kevin Pinches  - Neutral  - Pronated     15x  15x   1/11   [place] Press  1 15 1/11   [place] 6# 1 15 1/11          Therapeutic Activities (57217)                                                 NMR re-education (14715)       Digiflex - digital progression    Digiflex - gripping Powerweb:  Splayed Fingers to one point    One point to splayed fingers    Supination    Pronation     red      Red      Red    red   1      1      1    1   10      15      10    10 1/11   Pincer  Velcro Cubes:  Remove & Place Finger Adduction Isometric:  Thumb/1st  1st/2nd  2nd/3rd  3rd/4th  4th/5th  Farmer's Carry 15#  10# 25ft  100ft 4  1   1/11 -  would slip with 15#    Tennis Racket:  Pron/Supination - wrist  Forearm  - pron/sup    1  1   15  15 1/11 - rapid          Manual Intervention (48805)        Cerv mobs:  Sideglides L to R, R to L  Cervical traction  Cervical PA Thoracic mobs/manip CT manip Rib mobilizations STM Median/ulnar nerve glides   Modalities:     Patient education:  -pt educated on diagnosis, prognosis and expectations for rehab  -all pt questions were answered    Home Exercise Program:  Access Code: [email or web address]  URL: [email or web address]  Date: 01/09/2023  Prepared by: Juanita Chávez    Exercises  Seated Wrist Flexion with Dumbbell - 1 x daily - 7 x weekly - 3 sets - 10 reps  Seated Wrist Extension with Dumbbell - 1 x daily - 7 x weekly - 3 sets - 10 reps  Tip Pinch with Putty - 1 x daily - 7 x weekly - 10 reps  Key Pinch with Putty - 1 x daily - 7 x weekly - 20 reps  Thumb Opposition with Putty - 1 x daily - 7 x weekly - 20 reps  Seated Finger Adduction with Putty - 1 x daily - 7 x weekly - 2 sets - 10 reps  Ulnar Nerve Flossing - 2 x daily - 7 x weekly - 2 sets - 10 reps  Median Nerve Flossing - Tray - 2 x daily - 7 x weekly - 1 sets - 10 reps  Wrist Tendon Gliding - 1 x daily - 7 x weekly - 3 sets - 10 reps  Resisted Finger Abduction - Index and Middle - 1 x daily - 7 x weekly - 2 sets - 15 reps  Resisted Finger Abduction - Index and Small - 1 x daily - 7 x weekly - 2 sets - 15 reps    1/4 - provided rubber bands for finger abduction; recommended tabletop to clawhand, wrist flex/ext/sup/pron with weight, pad to pad contact (thumb through 5th digit), gripping (with ulnar and median biases). Access Code: [email or web address]  URL: [email or web address]  Date: 12/27/2022  Prepared by: Rosemary Cervantes    Exercises  Seated Wrist Flexion with Dumbbell - 1 x daily - 7 x weekly - 3 sets - 10 reps  Seated Wrist Extension with Dumbbell - 1 x daily - 7 x weekly - 3 sets - 10 reps  Tip Pinch with Putty - 1 x daily - 7 x weekly - 10 reps  Key Pinch with Putty - 1 x daily - 7 x weekly - 20 reps  Thumb Opposition with Putty - 1 x daily - 7 x weekly - 20 reps    Therapeutic Exercise and NMR EXR  [x] (03457) Provided verbal/tactile cueing for activities related to strengthening, flexibility, endurance, ROM  for improvements in cervical, postural, scapular, scapulothoracic and UE control with self care, reaching, carrying, lifting, house/yardwork, driving/computer work. [x] (97709) Provided verbal/tactile cueing for activities related to improving balance, coordination, kinesthetic sense, posture, motor skill, proprioception  to assist with cervical, scapular, scapulothoracic and UE control with self care, reaching, carrying, lifting, house/yardwork, driving/computer work.  [] (31286) Therapist is in constant attendance of 2 or more patients providing skilled therapy interventions, but not providing any significant amount of measurable one-on-one time to either patient, for improvements in cervical, scapular, scapulothoracic and UE control with self care, reaching, carrying, lifting, house/yardwork, driving, computer work. Therapeutic Activities:    [] (97540 or 19228) Provided verbal/tactile cueing for activities related to improving balance, coordination, kinesthetic sense, posture, motor skill, proprioception and motor activation to allow for proper function of cervical, scapular, scapulothoracic and UE control with self care, carrying, lifting, driving/computer work.      Home Exercise Program:    [] (53649) Reviewed/Progressed HEP activities related to strengthening, flexibility, endurance, ROM of cervical, scapular, scapulothoracic and UE control with self care, reaching, carrying, lifting, house/yardwork, driving/computer work  [] (77215) Reviewed/Progressed HEP activities related to improving balance, coordination, kinesthetic sense, posture, motor skill, proprioception of cervical, scapular, scapulothoracic and UE control with self care, reaching, carrying, lifting, house/yardwork, driving/computer work      Manual Treatments:  PROM / STM / Oscillations-Mobs:  G-I, II, III, IV (PA's, Inf., Post.)  [] (88577) Provided manual therapy to mobilize soft tissue/joints of cervical/CT, scapular GHJ and UE for the purpose of decreasing headache, modulating pain, promoting relaxation,  increasing ROM, reducing/eliminating soft tissue swelling/inflammation/restriction, improving soft tissue extensibility and allowing for proper ROM for normal function with self care, reaching, carrying, lifting, house/yardwork, driving/computer work    Charges:  Timed Code Treatment Minutes: 44   Total Treatment Minutes: 44     [] EVAL - LOW (29202)   [] EVAL - MOD (88039)  [] EVAL - HIGH (90138)  [] RE-EVAL (91662)  [x] NM(93420) x 2      [] Ionto  [x] NMR (53795) x 1       [] Vaso  [] Manual (57811) x      [] Ultrasound  [] TA x        [] Mech Traction (27269)  [] Aquatic Therapy x     [] ES (un) (64968):   [] Home Management Training x  [] ES(attended) (96822)   [] Dry Needling 1-2 muscles (01775):  [] Dry Needling 3+ muscles (413682  [] Group:      [] Other:     GOALS:  Patient stated goal: improve L UE usage during work tasks as well as decrease N/T   [] Progressing: [] Met: [] Not Met: [] Adjusted    Therapist goals for Patient:   Short Term Goals: To be achieved in: 2 weeks  1. Independent in HEP and progression per patient tolerance, in order to prevent re-injury. [] Progressing: [] Met: [] Not Met: [] Adjusted  2. Patient will have a decrease in pain to facilitate improvement in movement, function, and ADLs as indicated by Functional Deficits. [] Progressing: [] Met: [] Not Met: [] Adjusted    Long Term Goals: To be achieved in: 8 weeks  1. FOTO score of at least - to assist with reaching prior level of function. [] Progressing: [] Met: [] Not Met: [] Adjusted  2. Patient will demonstrate increased AROM to [facility] of cervical/thoracic spine to allow for proper joint functioning as needed for full work tasks without limitations or restrictions   [] Progressing: [] Met: [] Not Met: [] Adjusted  3.  Patient will demonstrate an increase in postural awareness and control and increased L  strength to within 15 lbs of R  strength to improve L UE usage during IADL's and work activity. [] Progressing: [] Met: [] Not Met: [] Adjusted  4. Patient will return to functional activities including trying shoes without increased symptoms or restriction with L hand dexterity. [] Progressing: [] Met: [] Not Met: [] Adjusted    Overall Progression Towards Functional goals/ Treatment Progress Update:  [] Patient is progressing as expected towards functional goals listed. [] Progression is slowed due to complexities/Impairments listed. [] Progression has been slowed due to co-morbidities. [x] Plan just implemented, too soon to assess goals progression <30days   [] Goals require adjustment due to lack of progress  [] Patient is not progressing as expected and requires additional follow up with physician  [] Other    Persisting Functional Limitations/Impairments:  []Sitting []Standing   []Walking []Squatting/bending    []Stairs [x]ADL's    []Transfers []Reaching  []Housework [x]Lifting  []Driving [x]Job related tasks  []Sports/Recreation  []Sleeping  []Other:    ASSESSMENT:  Ongoing weakness in hand, forearm, as well as numbness in 4th, 5th fingers. The patient was challenged again by fine dexterity, , thumb, intrinsic, hypothenar activity. Ongoing focus on intrinsics, wrist/hand long tendons, ulnar-innervated musculature at this time. If neck and scapular sxs do not return, consider OT consult/eval for more specific wrist and hand treatment. Treatment/Activity Tolerance:  [x] Patient able to complete tx  [] Patient limited by fatique  [] Patient limited by pain   [] Patient limited by other medical complications  [] Other:     Prognosis: [x] Good [] Fair  [] Poor    Patient Requires Follow-up: [x] Yes  [] No    PLAN: See eval. PT 1-2x / week for 8 weeks.    [x] Continue per plan of care [] Alter current plan (see comments)  [] Plan of care initiated [] Hold pending MD visit [] Discharge    Electronically signed by: Nan Ryder PT DPT, OMT-C      Note: If patient does not return for scheduled/ recommended follow up visits, this note will serve as a discharge from care along with most recent update on progress.

## 2023-01-17 ENCOUNTER — HOSPITAL ENCOUNTER (OUTPATIENT)
Dept: PHYSICAL THERAPY | Age: 63
Setting detail: THERAPIES SERIES
Discharge: HOME | End: 2023-01-17
Payer: COMMERCIAL

## 2023-01-17 PROCEDURE — 97112 NEUROMUSCULAR REEDUCATION: CPT

## 2023-01-17 PROCEDURE — 97110 THERAPEUTIC EXERCISES: CPT

## 2023-01-17 NOTE — FLOWSHEET NOTE
[address]  Phone: [phone]   Fax: [phone]    Physical Therapy Daily Treatment Note    Date:  2023     Patient Name:  Mckenzie Cuevas    :  1960  MRN: [id number]  Medical Diagnosis:  Cervical spondylosis with radiculopathy [M47.22]  Treatment Diagnosis: decreased cervical ROM, scapular strength, control and postural awareness contributing to persisting intermittent neurological compression contributing to decreased strength in L UE as well as pain and limitations with ADL, IADL, recreational and work activity. Insurance/Certification information:  PT Insurance Information: Keyla Dey (20 visits PCY: $50 copay; auth required AFTER visit 12)  Physician Information:  Elmer Nieves MD    Plan of care signed (Y/N): [x]  Yes []  No     Date of Patient follow up with Physician:      Progress Report: []  Yes  [x]  No     Date Range for reporting period:  Beginnin2022  Ending:     Progress report due (10 Rx/or 30 days whichever is less): visit #10 or  (date)     Recertification due (POC duration/ or 90 days whichever is less): visit #- or  (date)     Visit # Insurance Allowable Auth required? Date Range   6 20 [x]  Yes - AFTER VISIT 12  []  No -       Units approved Units used Date Range   - - -     Latex Allergy:  [x]NO      []YES  Preferred Language for Healthcare:   [x]English       []other:    Functional Scale:           Date assessed:  FOTO physical FS primary measure score = 48     23    Pain level:  0/10     SUBJECTIVE: Pt reports overall he continues to do well and stabilizing with regards to neck symptoms. Pt reports very intermittently having shots of pain in neck and shoulder blade area but not consistent and not limiting. Pt reports his hand continues to be numb but feels his strength is getting better as he no longer notices his hand during most activity.  Activity involving fine motor skills of holding a nail/screw are still really noticeable and limited but feels they are getting better and at least he can do things like that again. OBJECTIVE:   Observation:   Test measurements:    1/9 -  (in #): R: 90, 98, 90, L: 18, 19, 18; Thumb Pad Pinch: R: 19, 18, 19, L: 5, 4, 5  1/4 - Median / Ulnar ULTT + for tension, discomfort (not pain) on LUE, all negative R    RESTRICTIONS/PRECAUTIONS: Carpal tunnel and cubital tunnel release in 2017; recent EMG showing continued issues at C8-T1, cubital tunnel and median nerve at carpal tunnel; Recent JIMMIE in cervical spine    Exercises/Interventions:   Therapeutic Exercise (20835) Resistance / level Sets/sec Reps Notes     Tabletop to Clawhand   15x 1/6 - ^ reps   Opposition all fingers   x20 1/17   Flexbar:  Supination  Pronation  Wrist Extension  Wrist Flexion  Opening top with 3 jaw cher     Green  Green  Green  Green  Red     25x  25x  25x  25x  x15   Theraband green finger Gambell ext with contact at distal fingers   x20 1/17   Rubber band:  1st/2nd finger abduction  4th/5th finger abduction  2nd/3rd finger abduction  1st/Thumb abduction UBE \"Mr. Flores\" - up Edenilson Climes  - palms up / down 5#  3x palm down  2x palm up 1/11 - palms up/down   Weighted bar:  supination/pronation  RD/UD 2 weights distally   1  1   20  20 1/9 - replaced weight pron/sup   Push-Ups - Incline on Table Juxtapositioner Kevin Anthony  - Neutral  - [address]        Therapeutic Activities (59952)                                                 NMR re-education (51011)       Digiflex - digital progression    Digiflex - gripping Powerweb:  Splayed Fingers to one point    One point to splayed fingers    Supination    Pronation     red      Red      Red    red   1      1      1    1   10      15      10    10 1/11   Pincer  Velcro Cubes:  Remove & Place Finger Adduction Isometric:  Thumb/1st  1st/2nd  2nd/3rd  3rd/4th  4th/5th  Farmer's Carry   5# head of weight hold  6# head of weight hold   100 ft    100 ft 4  1    1    1   1/11 -  would slip with 15#    Single arm row with CC and pronation to supination 3 plates 2 10 0/95   Double arm row with straps and pinch  2 plates 2 10 7/45   Tricep ext with straps 3 plates 2 10 0/48                                      Manual Intervention (26721)        Cerv mobs:  Sideglides L to R, R to L  Cervical traction  Cervical PA Thoracic mobs/manip CT manip Rib mobilizations STM Median/ulnar nerve glides   Modalities:     Patient education:  -pt educated on diagnosis, prognosis and expectations for rehab  -all pt questions were answered    Home Exercise Program:  Access Code: [email or web address]  URL: [email or web address]  Date: 01/09/2023  Prepared by: Keeley Pack    Exercises  Seated Wrist Flexion with Dumbbell - 1 x daily - 7 x weekly - 3 sets - 10 reps  Seated Wrist Extension with Dumbbell - 1 x daily - 7 x weekly - 3 sets - 10 reps  Tip Pinch with Putty - 1 x daily - 7 x weekly - 10 reps  Key Pinch with Putty - 1 x daily - 7 x weekly - 20 reps  Thumb Opposition with Putty - 1 x daily - 7 x weekly - 20 reps  Seated Finger Adduction with Putty - 1 x daily - 7 x weekly - 2 sets - 10 reps  Ulnar Nerve Flossing - 2 x daily - 7 x weekly - 2 sets - 10 reps  Median Nerve Flossing - Tray - 2 x daily - 7 x weekly - 1 sets - 10 reps  Wrist Tendon Gliding - 1 x daily - 7 x weekly - 3 sets - 10 reps  Resisted Finger Abduction - Index and Middle - 1 x daily - 7 x weekly - 2 sets - 15 reps  Resisted Finger Abduction - Index and Small - 1 x daily - 7 x weekly - 2 sets - 15 reps    1/4 - provided rubber bands for finger abduction; recommended tabletop to clawhand, wrist flex/ext/sup/pron with weight, pad to pad contact (thumb through 5th digit), gripping (with ulnar and median biases). Access Code: [email or web address]  URL: [email or web address]  Date: 12/27/2022  Prepared by: Brianne Shultz    Exercises  Seated Wrist Flexion with Dumbbell - 1 x daily - 7 x weekly - 3 sets - 10 reps  Seated Wrist Extension with Dumbbell - 1 x daily - 7 x weekly - 3 sets - 10 reps  Tip Pinch with Putty - 1 x daily - 7 x weekly - 10 reps  Key Pinch with Putty - 1 x daily - 7 x weekly - 20 reps  Thumb Opposition with Putty - 1 x daily - 7 x weekly - 20 reps    Therapeutic Exercise and NMR EXR  [x] (91377) Provided verbal/tactile cueing for activities related to strengthening, flexibility, endurance, ROM  for improvements in cervical, postural, scapular, scapulothoracic and UE control with self care, reaching, carrying, lifting, house/yardwork, driving/computer work. [x] (50976) Provided verbal/tactile cueing for activities related to improving balance, coordination, kinesthetic sense, posture, motor skill, proprioception  to assist with cervical, scapular, scapulothoracic and UE control with self care, reaching, carrying, lifting, house/yardwork, driving/computer work.  [] (08823) Therapist is in constant attendance of 2 or more patients providing skilled therapy interventions, but not providing any significant amount of measurable one-on-one time to either patient, for improvements in cervical, scapular, scapulothoracic and UE control with self care, reaching, carrying, lifting, house/yardwork, driving, computer work. Therapeutic Activities:    [] (15720 or 93034) Provided verbal/tactile cueing for activities related to improving balance, coordination, kinesthetic sense, posture, motor skill, proprioception and motor activation to allow for proper function of cervical, scapular, scapulothoracic and UE control with self care, carrying, lifting, driving/computer work.      Home Exercise Program:    [] (81532) Reviewed/Progressed HEP activities related to strengthening, flexibility, endurance, ROM of cervical, scapular, scapulothoracic and UE control with self care, reaching, carrying, lifting, house/yardwork, driving/computer work  [] (86476) Reviewed/Progressed HEP activities related to improving balance, coordination, kinesthetic sense, posture, motor skill, proprioception of cervical, scapular, scapulothoracic and UE control with self care, reaching, carrying, lifting, house/yardwork, driving/computer work      Manual Treatments:  PROM / STM / Oscillations-Mobs:  G-I, II, III, IV (PA's, Inf., Post.)  [] (74127) Provided manual therapy to mobilize soft tissue/joints of cervical/CT, scapular GHJ and UE for the purpose of decreasing headache, modulating pain, promoting relaxation,  increasing ROM, reducing/eliminating soft tissue swelling/inflammation/restriction, improving soft tissue extensibility and allowing for proper ROM for normal function with self care, reaching, carrying, lifting, house/yardwork, driving/computer work    Charges:  Timed Code Treatment Minutes: 43   Total Treatment Minutes: 43     [] EVAL - LOW (50665)   [] EVAL - MOD (02867)  [] EVAL - HIGH (86160)  [] RE-EVAL (06414)  [x] AJ(19952) x 1      [] Ionto  [x] NMR (49514) x 2       [] Vaso  [] Manual (83171) x      [] Ultrasound  [] TA x        [] Mech Traction (37550)  [] Aquatic Therapy x     [] ES (un) (32988):   [] Home Management Training x  [] ES(attended) (99288)   [] Dry Needling 1-2 muscles (68265):  [] Dry Needling 3+ muscles (426809  [] Group:      [] Other:     GOALS:  Patient stated goal: improve L UE usage during work tasks as well as decrease N/T   [] Progressing: [] Met: [] Not Met: [] Adjusted    Therapist goals for Patient:   Short Term Goals: To be achieved in: 2 weeks  1. Independent in HEP and progression per patient tolerance, in order to prevent re-injury. [] Progressing: [] Met: [] Not Met: [] Adjusted  2. Patient will have a decrease in pain to facilitate improvement in movement, function, and ADLs as indicated by Functional Deficits. [] Progressing: [] Met: [] Not Met: [] Adjusted    Long Term Goals: To be achieved in: 8 weeks  1.  FOTO score of at least - to assist with reaching prior level of function. [] Progressing: [] Met: [] Not Met: [] Adjusted  2. Patient will demonstrate increased AROM to [facility] of cervical/thoracic spine to allow for proper joint functioning as needed for full work tasks without limitations or restrictions   [] Progressing: [] Met: [] Not Met: [] Adjusted  3. Patient will demonstrate an increase in postural awareness and control and increased L  strength to within 15 lbs of R  strength to improve L UE usage during IADL's and work activity. [] Progressing: [] Met: [] Not Met: [] Adjusted  4. Patient will return to functional activities including trying shoes without increased symptoms or restriction with L hand dexterity. [] Progressing: [] Met: [] Not Met: [] Adjusted    Overall Progression Towards Functional goals/ Treatment Progress Update:  [] Patient is progressing as expected towards functional goals listed. [] Progression is slowed due to complexities/Impairments listed. [] Progression has been slowed due to co-morbidities. [x] Plan just implemented, too soon to assess goals progression <30days   [] Goals require adjustment due to lack of progress  [] Patient is not progressing as expected and requires additional follow up with physician  [] Other    Persisting Functional Limitations/Impairments:  []Sitting []Standing   []Walking []Squatting/bending    []Stairs [x]ADL's    []Transfers []Reaching  []Housework [x]Lifting  []Driving [x]Job related tasks  []Sports/Recreation  []Sleeping  []Other:    ASSESSMENT:  Progressions with activity as noted per log, focus on gripping strategies during familiar movement tasks to work various techniques for intrinsic hand strength. Pt was able to tolerate these progressions however needing breaks and repositioning as fatigue sets in quickly making repetitive gripping difficult at higher weights needing to train lower weights for improved neuromuscular control.      Treatment/Activity Tolerance:  [x] Patient able to complete tx  [] Patient limited by fatique  [] Patient limited by pain   [] Patient limited by other medical complications  [] Other:     Prognosis: [x] Good [] Fair  [] Poor    Patient Requires Follow-up: [x] Yes  [] No    PLAN: See eval. PT 1-2x / week for 8 weeks. [x] Continue per plan of care [] Alter current plan (see comments)  [] Plan of care initiated [] Hold pending MD visit [] Discharge    Electronically signed by: Marilyn Olmos, PT DPT, OCS, OMT-C      Note: If patient does not return for scheduled/ recommended follow up visits, this note will serve as a discharge from care along with most recent update on progress.

## 2023-01-19 ENCOUNTER — HOSPITAL ENCOUNTER (OUTPATIENT)
Dept: PHYSICAL THERAPY | Age: 63
Setting detail: THERAPIES SERIES
Discharge: HOME | End: 2023-01-19
Payer: COMMERCIAL

## 2023-01-19 PROCEDURE — 97110 THERAPEUTIC EXERCISES: CPT

## 2023-01-19 PROCEDURE — 97112 NEUROMUSCULAR REEDUCATION: CPT

## 2023-01-19 NOTE — FLOWSHEET NOTE
[address]  Phone: [phone]   Fax: [phone]    Physical Therapy Daily Treatment Note    Date:  2023     Patient Name:  Kamilah Weiner    :  1960  MRN: [id number]  Medical Diagnosis:  Cervical spondylosis with radiculopathy [M47.22]  Treatment Diagnosis: decreased cervical ROM, scapular strength, control and postural awareness contributing to persisting intermittent neurological compression contributing to decreased strength in L UE as well as pain and limitations with ADL, IADL, recreational and work activity. Insurance/Certification information:  PT Insurance Information: Saad Slater (20 visits PCY: $50 copay; auth required AFTER visit 12)  Physician Information:  Khoa Pineda MD    Plan of care signed (Y/N): [x]  Yes []  No     Date of Patient follow up with Physician:      Progress Report: []  Yes  [x]  No     Date Range for reporting period:  Beginnin2022  Ending:     Progress report due (10 Rx/or 30 days whichever is less): visit #10 or  (date)     Recertification due (POC duration/ or 90 days whichever is less): visit #- or  (date)     Visit # Insurance Allowable Auth required? Date Range   7 20 [x]  Yes - AFTER VISIT 12  []  No -       Units approved Units used Date Range   - - -     Latex Allergy:  [x]NO      []YES  Preferred Language for Healthcare:   [x]English       []other:    Functional Scale:           Date assessed:  FOTO physical FS primary measure score = 48     23    Pain level:  0/10     SUBJECTIVE: Pt states he has been doing well since last session, not having any pain, numbness still in the hand. Pt does report he feels his strength and motor control is getting a little better and has noticed he is opening doors and able to twist knobs more consistently without as much issues.      OBJECTIVE:   Observation:   Test measurements:    : Pt unable to demonstrate thumb ABD due to muscle weakness  1/9 -  (in #): R: 90, 98, 90, L: 18, 19, 18; Thumb Pad Pinch: R: 19, 18, 19, L: 5, 4, 5  1/4 - Median / Ulnar ULTT + for tension, discomfort (not pain) on LUE, all negative R    RESTRICTIONS/PRECAUTIONS: Carpal tunnel and cubital tunnel release in 2017; recent EMG showing continued issues at C8-T1, cubital tunnel and median nerve at carpal tunnel; Recent JIMMIE in cervical spine    Exercises/Interventions:   Therapeutic Exercise (22326) Resistance / level Sets/sec Reps Notes     Tabletop to Clawhand   Opposition all fingers   Flexbar:  Supination  Pronation  Wrist Extension  Wrist Flexion  Opening top with 3 jaw cher     Green  Green  Green  Green  Red     25x  25x  25x  25x  x15   Theraband green finger [place] ext with contact at distal fingers   x20 1/17   Rubber band:  1st/2nd finger abduction  4th/5th finger abduction  2nd/3rd finger abduction  1st/Thumb abduction UBE 2'/2'  RPM 60   1/6   \"Mr. Flores\" - up Noretta Drape  - palms up / down Weighted bar:  supination/pronation  RD/UD 2 weights distally   1  1   30  30 1/9 - replaced weight pron/sup, ^1/19 reps   Push-Ups - Incline on Table Juxtapositioner Kevin Pinches  - Neutral  - Pronated [facility]        Therapeutic Activities (30137)                                                 NMR re-education (61099)       Digiflex - digital progression    Digiflex - gripping Powerweb:  Splayed Fingers to one point    One point to splayed fingers    Supination    Pronation     Green    Green    Yellow    Yellow   1      1      1    1   20      20      10    10 1/11   Pincer  Velcro Cubes:  Remove & Place Finger Adduction Isometric:  Thumb/1st  1st/2nd  2nd/3rd  3rd/4th  4th/5th  Farmer's Carry   5# head of weight hold  6# head of weight hold   100 ft    100 ft 4  1    1    1   1/11 -  would slip with 15#    Farmer's Carry holding weights in between fingers 1#x2  3#x2 75'  75' 1  1 1/19   Single arm row with CC and pronation to supination 3 plates 2 10 8/16   Double arm row with straps and pinch  2 plates 2 10 9/19   Tricep ext with straps 3 plates 2 10 6/12                                      Manual Intervention (79090)        Cerv mobs:  Sideglides L to R, R to L  Cervical traction  Cervical PA Thoracic mobs/manip CT manip Rib mobilizations STM Median/ulnar nerve glides   Modalities:     Patient education:  -pt educated on diagnosis, prognosis and expectations for rehab  -all pt questions were answered    Home Exercise Program:  Access Code: [email or web address]  URL: [employer]/  Date: 01/09/2023  Prepared by: Gurinder Bose    Exercises  Seated Wrist Flexion with Dumbbell - 1 x daily - 7 x weekly - 3 sets - 10 reps  Seated Wrist Extension with Dumbbell - 1 x daily - 7 x weekly - 3 sets - 10 reps  Tip Pinch with Putty - 1 x daily - 7 x weekly - 10 reps  Key Pinch with Putty - 1 x daily - 7 x weekly - 20 reps  Thumb Opposition with Putty - 1 x daily - 7 x weekly - 20 reps  Seated Finger Adduction with Putty - 1 x daily - 7 x weekly - 2 sets - 10 reps  Ulnar Nerve Flossing - 2 x daily - 7 x weekly - 2 sets - 10 reps  Median Nerve Flossing - Tray - 2 x daily - 7 x weekly - 1 sets - 10 reps  Wrist Tendon Gliding - 1 x daily - 7 x weekly - 3 sets - 10 reps  Resisted Finger Abduction - Index and Middle - 1 x daily - 7 x weekly - 2 sets - 15 reps  Resisted Finger Abduction - Index and Small - 1 x daily - 7 x weekly - 2 sets - 15 reps    1/4 - provided rubber bands for finger abduction; recommended tabletop to clawhand, wrist flex/ext/sup/pron with weight, pad to pad contact (thumb through 5th digit), gripping (with ulnar and median biases). Access Code: [email or web address]  URL: [employer]/  Date: 12/27/2022  Prepared by: Chu Roper    Exercises  Seated Wrist Flexion with Dumbbell - 1 x daily - 7 x weekly - 3 sets - 10 reps  Seated Wrist Extension with Dumbbell - 1 x daily - 7 x weekly - 3 sets - 10 reps  Tip Pinch with Putty - 1 x daily - 7 x weekly - 10 reps  Key Pinch with Putty - 1 x daily - 7 x weekly - 20 reps  Thumb Opposition with Putty - 1 x daily - 7 x weekly - 20 reps    Therapeutic Exercise and NMR EXR  [x] (18558) Provided verbal/tactile cueing for activities related to strengthening, flexibility, endurance, ROM  for improvements in cervical, postural, scapular, scapulothoracic and UE control with self care, reaching, carrying, lifting, house/yardwork, driving/computer work. [x] (02099) Provided verbal/tactile cueing for activities related to improving balance, coordination, kinesthetic sense, posture, motor skill, proprioception  to assist with cervical, scapular, scapulothoracic and UE control with self care, reaching, carrying, lifting, house/yardwork, driving/computer work.  [] (91775) Therapist is in constant attendance of 2 or more patients providing skilled therapy interventions, but not providing any significant amount of measurable one-on-one time to either patient, for improvements in cervical, scapular, scapulothoracic and UE control with self care, reaching, carrying, lifting, house/yardwork, driving, computer work. Therapeutic Activities:    [] (09995 or 31380) Provided verbal/tactile cueing for activities related to improving balance, coordination, kinesthetic sense, posture, motor skill, proprioception and motor activation to allow for proper function of cervical, scapular, scapulothoracic and UE control with self care, carrying, lifting, driving/computer work.      Home Exercise Program:    [] (09654) Reviewed/Progressed HEP activities related to strengthening, flexibility, endurance, ROM of cervical, scapular, scapulothoracic and UE control with self care, reaching, carrying, lifting, house/yardwork, driving/computer work  [] (48302) Reviewed/Progressed HEP activities related to improving balance, coordination, kinesthetic sense, posture, motor skill, proprioception of cervical, scapular, scapulothoracic and UE control with self care, reaching, carrying, lifting, house/yardwork, driving/computer work      Manual Treatments:  PROM / STM / Oscillations-Mobs:  G-I, II, III, IV (Chi, Inf., Post.)  [] (30805) Provided manual therapy to mobilize soft tissue/joints of cervical/CT, scapular GHJ and UE for the purpose of decreasing headache, modulating pain, promoting relaxation,  increasing ROM, reducing/eliminating soft tissue swelling/inflammation/restriction, improving soft tissue extensibility and allowing for proper ROM for normal function with self care, reaching, carrying, lifting, house/yardwork, driving/computer work    Charges:  Timed Code Treatment Minutes: 43   Total Treatment Minutes: 43     [] EVAL - LOW (70038)   [] EVAL - MOD (42948)  [] EVAL - HIGH (09071)  [] RE-EVAL (59303)  [x] BM(67602) x 1      [] Ionto  [x] NMR (02049) x 2       [] Vaso  [] Manual (56435) x      [] Ultrasound  [] TA x        [] Mech Traction (89078)  [] Aquatic Therapy x     [] ES (un) (40669):   [] Home Management Training x  [] ES(attended) (10422)   [] Dry Needling 1-2 muscles (33227):  [] Dry Needling 3+ muscles (181794  [] Group:      [] Other:     GOALS:  Patient stated goal: improve L UE usage during work tasks as well as decrease N/T   [] Progressing: [] Met: [] Not Met: [] Adjusted    Therapist goals for Patient:   Short Term Goals: To be achieved in: 2 weeks  1. Independent in HEP and progression per patient tolerance, in order to prevent re-injury. [] Progressing: [] Met: [] Not Met: [] Adjusted  2. Patient will have a decrease in pain to facilitate improvement in movement, function, and ADLs as indicated by Functional Deficits. [] Progressing: [] Met: [] Not Met: [] Adjusted    Long Term Goals: To be achieved in: 8 weeks  1. FOTO score of at least - to assist with reaching prior level of function. [] Progressing: [] Met: [] Not Met: [] Adjusted  2.  Patient will demonstrate increased AROM to [facility] of cervical/thoracic spine to allow for proper joint functioning as needed for full work tasks without limitations or restrictions   [] Progressing: [] Met: [] Not Met: [] Adjusted  3. Patient will demonstrate an increase in postural awareness and control and increased L  strength to within 15 lbs of R  strength to improve L UE usage during IADL's and work activity. [] Progressing: [] Met: [] Not Met: [] Adjusted  4. Patient will return to functional activities including trying shoes without increased symptoms or restriction with L hand dexterity. [] Progressing: [] Met: [] Not Met: [] Adjusted    Overall Progression Towards Functional goals/ Treatment Progress Update:  [] Patient is progressing as expected towards functional goals listed. [] Progression is slowed due to complexities/Impairments listed. [] Progression has been slowed due to co-morbidities. [x] Plan just implemented, too soon to assess goals progression <30days   [] Goals require adjustment due to lack of progress  [] Patient is not progressing as expected and requires additional follow up with physician  [] Other    Persisting Functional Limitations/Impairments:  []Sitting []Standing   []Walking []Squatting/bending    []Stairs [x]ADL's    []Transfers []Reaching  []Housework [x]Lifting  []Driving [x]Job related tasks  []Sports/Recreation  []Sleeping  []Other:    ASSESSMENT:  Pt did well with session today, overall continues to tolerate PT well without reports of pain. Pt continues with greatest deficit lacking functional strength and intrinsic hand motor control. Pt struggles most with lumbrical activity as well as gross thumb strength and control. With interventions to target these areas pt fatigues very quickly lacking appropriate motor control to maintain consistent performance with interventions.      Treatment/Activity Tolerance:  [x] Patient able to complete tx  [] Patient limited by fatique  [] Patient limited by pain   [] Patient limited by other medical complications  [] Other:     Prognosis: [x] Good [] Fair  [] Poor    Patient Requires Follow-up: [x] Yes  [] No    PLAN: See eval. PT 1-2x / week for 8 weeks. [x] Continue per plan of care [] Alter current plan (see comments)  [] Plan of care initiated [] Hold pending MD visit [] Discharge    Electronically signed by: Joel Montana, PT DPT, OCS, OMT-C      Note: If patient does not return for scheduled/ recommended follow up visits, this note will serve as a discharge from care along with most recent update on progress.

## 2023-01-24 ENCOUNTER — HOSPITAL ENCOUNTER (OUTPATIENT)
Dept: PHYSICAL THERAPY | Age: 63
Setting detail: THERAPIES SERIES
Discharge: HOME | End: 2023-01-24
Payer: COMMERCIAL

## 2023-01-24 PROCEDURE — 97110 THERAPEUTIC EXERCISES: CPT

## 2023-01-24 PROCEDURE — 97530 THERAPEUTIC ACTIVITIES: CPT

## 2023-01-24 PROCEDURE — 97112 NEUROMUSCULAR REEDUCATION: CPT

## 2023-01-24 NOTE — PROGRESS NOTES
[address]  Phone: [phone]   Fax: [phone]    Physical Therapy Daily Treatment Note    Date:  2023     Patient Name:  Gordon Sorto    :  1960  MRN: [id number]  Medical Diagnosis:  Cervical spondylosis with radiculopathy [M47.22]  Treatment Diagnosis: decreased cervical ROM, scapular strength, control and postural awareness contributing to persisting intermittent neurological compression contributing to decreased strength in L UE as well as pain and limitations with ADL, IADL, recreational and work activity. Insurance/Certification information:  PT Insurance Information: Matt Sánchez (20 visits PCY: $50 copay; auth required AFTER visit 12)  Physician Information:  Bev Reed MD    Plan of care signed (Y/N): [x]  Yes []  No     Date of Patient follow up with Physician:      Progress Report: []  Yes  [x]  No     Date Range for reporting period:  Beginnin2022  Ending:     Progress report due (10 Rx/or 30 days whichever is less): visit #10 or  (date)     Recertification due (POC duration/ or 90 days whichever is less): visit #- or  (date)     Visit # Insurance Allowable Auth required? Date Range   8 20 [x]  Yes - AFTER VISIT 12  []  No -       Units approved Units used Date Range   - - -     Latex Allergy:  [x]NO      []YES  Preferred Language for Healthcare:   [x]English       []other:    Functional Scale:           Date assessed:  FOTO physical FS primary measure score = 48     23    Pain level:  0/10     SUBJECTIVE: Pt reports he continues to do well. Overall noticing slow and gradual improvements with usage of his L hand. 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  Neck and shoulder blades are doing really well with only very intermittent jabs of pain    OBJECTIVE:   Observation: Test measurements:    1/24:   Strength / Myotomes Left Right   Cervical Flexion (C1-2)       Cervical Side-bending (C3)       Shoulder Shrug (C4)       Shoulder Flex 4+ 4+   Shoulder Scap       Shoulder Abduction (C5) 5 5   Shoulder ER 5 4+   Shoulder IR 5 5   Biceps (C6) 5 5   Triceps (C7) 4 4+   Wrist Extension (C6) 5 5   Wrist Flexion (C7) 4- 5    (avg 3 trials) 19 lbs 89 lbs   Key  (avg 3 trials) 5 lbs 17 lbs   Thumb Abduction (C8) 3+ 5   Finger Abduction (T1)          1/19: Pt unable to demonstrate thumb ABD due to muscle weakness  1/9 -  (in #): R: 90, 98, 90, L: 18, 19, 18; Thumb Pad Pinch: R: 19, 18, 19, L: 5, 4, 5  1/4 - Median / Ulnar ULTT + for tension, discomfort (not pain) on LUE, all negative R    RESTRICTIONS/PRECAUTIONS: Carpal tunnel and cubital tunnel release in 2017; recent EMG showing continued issues at C8-T1, cubital tunnel and median nerve at carpal tunnel; Recent JIMMIE in cervical spine    Exercises/Interventions:   Therapeutic Exercise (60599) Resistance / level Sets/sec Reps Notes     Tabletop to Clawhand   Opposition all fingers   Flexbar:  Supination  Pronation  Wrist Extension  Wrist Flexion  Opening top with 3 jaw cher     Green  Green  Green  Green  Red     25x  25x  25x  25x  x15   Theraband green finger [place] ext with contact at distal fingers   Rubber band:  1st/2nd finger abduction  4th/5th finger abduction  2nd/3rd finger abduction  1st/Thumb abduction UBE 2'/2'  RPM 60   1/6   \"Mr. Flores\" - up Cally Ermelinda  - palms up / down Weighted bar:  supination/pronation  RD/UD 2 weights distally   1  1   30  30 1/9 - replaced weight pron/sup, ^1/19 reps   Push-Ups - Incline on Table Juxtapositioner Kevin Anthony  - Neutral  - [address]        Therapeutic Activities (99805)                                                 NMR re-education (57505)       Digiflex - digital progression    Digiflex - gripping Powerweb:  Splayed Fingers to one point    One point to splayed fingers    Supination    Pronation     Green    Green    Yellow    Yellow   1      1      1    1   20      20      10    10 1/11   Pincer  Velcro Cubes:  Remove & Place Finger Adduction Isometric:  Thumb/1st  1st/2nd  2nd/3rd  3rd/4th  4th/5th  Farmer's Carry   5# head of weight hold  6# head of weight hold   100 ft    100 ft 4  1    1    1   1/11 -  would slip with 15#    Farmer's Carry holding weights in between fingers Single arm row with CC and pronation to supination 3 plates 2 10 9/19   Double arm row with straps and pinch  2 plates 2 10 1/56   Tricep ext with straps 3 plates 2 10 3/50   D2 PNF Ext 3 plates  4 plates 1  1 10  10 2/77   Bent over row 10# KB  15# KB 1  1   10 R/L  10 R/L 1/24                        Manual Intervention (08907)        Cerv mobs:  Sideglides L to R, R to L  Cervical traction  Cervical PA Thoracic mobs/manip CT manip Rib mobilizations STM Median/ulnar nerve glides   Modalities:     Patient education:  -pt educated on diagnosis, prognosis and expectations for rehab  -all pt questions were answered    Home Exercise Program:  Access Code: [email or web address]  URL: [email or web address]  Date: 01/09/2023  Prepared by: Army Millan    Exercises  Seated Wrist Flexion with Dumbbell - 1 x daily - 7 x weekly - 3 sets - 10 reps  Seated Wrist Extension with Dumbbell - 1 x daily - 7 x weekly - 3 sets - 10 reps  Tip Pinch with Putty - 1 x daily - 7 x weekly - 10 reps  Key Pinch with Putty - 1 x daily - 7 x weekly - 20 reps  Thumb Opposition with Putty - 1 x daily - 7 x weekly - 20 reps  Seated Finger Adduction with Putty - 1 x daily - 7 x weekly - 2 sets - 10 reps  Ulnar Nerve Flossing - 2 x daily - 7 x weekly - 2 sets - 10 reps  Median Nerve Flossing - Tray - 2 x daily - 7 x weekly - 1 sets - 10 reps  Wrist Tendon Gliding - 1 x daily - 7 x weekly - 3 sets - 10 reps  Resisted Finger Abduction - Index and Middle - 1 x daily - 7 x weekly - 2 sets - 15 reps  Resisted Finger Abduction - Index and Small - 1 x daily - 7 x weekly - 2 sets - 15 reps    1/4 - provided rubber bands for finger abduction; recommended tabletop to clawhand, wrist flex/ext/sup/pron with weight, pad to pad contact (thumb through 5th digit), gripping (with ulnar and median biases). Access Code: [email or web address]  URL: [email or web address]  Date: 12/27/2022  Prepared by: Dena Walker    Exercises  Seated Wrist Flexion with Dumbbell - 1 x daily - 7 x weekly - 3 sets - 10 reps  Seated Wrist Extension with Dumbbell - 1 x daily - 7 x weekly - 3 sets - 10 reps  Tip Pinch with Putty - 1 x daily - 7 x weekly - 10 reps  Key Pinch with Putty - 1 x daily - 7 x weekly - 20 reps  Thumb Opposition with Putty - 1 x daily - 7 x weekly - 20 reps    Therapeutic Exercise and NMR EXR  [x] (81890) Provided verbal/tactile cueing for activities related to strengthening, flexibility, endurance, ROM  for improvements in cervical, postural, scapular, scapulothoracic and UE control with self care, reaching, carrying, lifting, house/yardwork, driving/computer work. [x] (95858) Provided verbal/tactile cueing for activities related to improving balance, coordination, kinesthetic sense, posture, motor skill, proprioception  to assist with cervical, scapular, scapulothoracic and UE control with self care, reaching, carrying, lifting, house/yardwork, driving/computer work.  [] (57293) Therapist is in constant attendance of 2 or more patients providing skilled therapy interventions, but not providing any significant amount of measurable one-on-one time to either patient, for improvements in cervical, scapular, scapulothoracic and UE control with self care, reaching, carrying, lifting, house/yardwork, driving, computer work.      Therapeutic Activities:    [] (22536 or 31133) Provided verbal/tactile cueing for activities related to improving balance, coordination, kinesthetic sense, posture, motor skill, proprioception and motor activation to allow for proper function of cervical, scapular, scapulothoracic and UE control with self care, carrying, lifting, driving/computer work. Home Exercise Program:    [] (03628) Reviewed/Progressed HEP activities related to strengthening, flexibility, endurance, ROM of cervical, scapular, scapulothoracic and UE control with self care, reaching, carrying, lifting, house/yardwork, driving/computer work  [] (90907) Reviewed/Progressed HEP activities related to improving balance, coordination, kinesthetic sense, posture, motor skill, proprioception of cervical, scapular, scapulothoracic and UE control with self care, reaching, carrying, lifting, house/yardwork, driving/computer work      Manual Treatments:  PROM / STM / Oscillations-Mobs:  G-I, II, III, IV (PA's, Inf., Post.)  [] (26611) Provided manual therapy to mobilize soft tissue/joints of cervical/CT, scapular GHJ and UE for the purpose of decreasing headache, modulating pain, promoting relaxation,  increasing ROM, reducing/eliminating soft tissue swelling/inflammation/restriction, improving soft tissue extensibility and allowing for proper ROM for normal function with self care, reaching, carrying, lifting, house/yardwork, driving/computer work    Charges:  Timed Code Treatment Minutes: 43   Total Treatment Minutes: 43     [] EVAL - LOW (32515)   [] EVAL - MOD (95016)  [] EVAL - HIGH (80851)  [] RE-EVAL (20560)  [x] XM(04692) x 1      [] Ionto  [x] NMR (94758) x 2       [] Vaso  [] Manual (98945) x      [] Ultrasound  [] TA x        [] Mech Traction (10104)  [] Aquatic Therapy x     [] ES (un) (15043):   [] Home Management Training x  [] ES(attended) (43947)   [] Dry Needling 1-2 muscles (56691):  [] Dry Needling 3+ muscles (607523  [] Group:      [] Other:     GOALS:  Patient stated goal: improve L UE usage during work tasks as well as decrease N/T   [] Progressing: [] Met: [] Not Met: [] Adjusted    Therapist goals for Patient:   Short Term Goals:  To be achieved in: 2 weeks  1. Independent in HEP and progression per patient tolerance, in order to prevent re-injury. [] Progressing: [] Met: [] Not Met: [] Adjusted  2. Patient will have a decrease in pain to facilitate improvement in movement, function, and ADLs as indicated by Functional Deficits. [] Progressing: [] Met: [] Not Met: [] Adjusted    Long Term Goals: To be achieved in: 8 weeks  1. FOTO score of at least - to assist with reaching prior level of function. [] Progressing: [] Met: [] Not Met: [] Adjusted  2. Patient will demonstrate increased [facility]/thoracic spine to allow for proper joint functioning as needed for full work tasks without limitations or restrictions   [] Progressing: [] Met: [] Not Met: [] Adjusted  3. Patient will demonstrate an increase in postural awareness and control and increased L  strength to within 15 lbs of R  strength to improve L UE usage during IADL's and work activity. [] Progressing: [] Met: [] Not Met: [] Adjusted  4. Patient will return to functional activities including trying shoes without increased symptoms or restriction with L hand dexterity. [] Progressing: [] Met: [] Not Met: [] Adjusted    Overall Progression Towards Functional goals/ Treatment Progress Update:  [] Patient is progressing as expected towards functional goals listed. [] Progression is slowed due to complexities/Impairments listed. [] Progression has been slowed due to co-morbidities.   [x] Plan just implemented, too soon to assess goals progression <30days   [] Goals require adjustment due to lack of progress  [] Patient is not progressing as expected and requires additional follow up with physician  [] Other    Persisting Functional Limitations/Impairments:  []Sitting []Standing   []Walking []Squatting/bending    []Stairs [x]ADL's    []Transfers []Reaching  []Housework [x]Lifting  []Driving [x]Job related tasks  []Sports/Recreation []Sleeping  []Other:    ASSESSMENT:  Progress note today. Overall ROM and general scapular and UE strength continues to do well with minimal pain, good range and progressing strength. Wrist/hand strength and intrinsic hand muscle activation seeming to be most limited with lack of general strength and motor control.  strength has progressed since last assessment but still significantly weaker on L compared to R. At this time due to progress PT will finish off of last few approved visits and then f/u prn with trial of d/c to HEP as per pt request.     Treatment/Activity Tolerance:  [x] Patient able to complete tx  [] Patient limited by fatique  [] Patient limited by pain   [] Patient limited by other medical complications  [] Other:     Prognosis: [x] Good [] Fair  [] Poor    Patient Requires Follow-up: [x] Yes  [] No    PLAN: See eval. PT 1-2x / week for 8 weeks. [x] Continue per plan of care [] Alter current plan (see comments)  [] Plan of care initiated [] Hold pending MD visit [] Discharge    Electronically signed by: Natalie Murphy, PT DPT, OCS, OMT-C      Note: If patient does not return for scheduled/ recommended follow up visits, this note will serve as a discharge from care along with most recent update on progress.

## 2023-01-26 ENCOUNTER — HOSPITAL ENCOUNTER (OUTPATIENT)
Dept: PHYSICAL THERAPY | Age: 63
Setting detail: THERAPIES SERIES
Discharge: HOME | End: 2023-01-26
Payer: COMMERCIAL

## 2023-01-26 PROCEDURE — 97110 THERAPEUTIC EXERCISES: CPT

## 2023-01-26 PROCEDURE — 97112 NEUROMUSCULAR REEDUCATION: CPT

## 2023-01-26 NOTE — FLOWSHEET NOTE
[address]  Phone: [phone]   Fax: [phone]    Physical Therapy Daily Treatment Note    Date:  2023     Patient Name:  Dia Godinez    :  1960  MRN: [id number]  Medical Diagnosis:  Cervical spondylosis with radiculopathy [M47.22]  Treatment Diagnosis: decreased cervical ROM, scapular strength, control and postural awareness contributing to persisting intermittent neurological compression contributing to decreased strength in L UE as well as pain and limitations with ADL, IADL, recreational and work activity. Insurance/Certification information:  PT Insurance Information: Meka Perales (20 visits PCY: $50 copay; auth required AFTER visit 12)  Physician Information:  Leodan Sharma MD    Plan of care signed (Y/N): [x]  Yes []  No     Date of Patient follow up with Physician:      Progress Report: []  Yes  [x]  No     Date Range for reporting period:  Beginnin2022  Ending:     Progress report due (10 Rx/or 30 days whichever is less): visit #10 or  (date)     Recertification due (POC duration/ or 90 days whichever is less): visit #- or  (date)     Visit # Insurance Allowable Auth required? Date Range   9 20 [x]  Yes - AFTER VISIT 12  []  No -       Units approved Units used Date Range   - - -     Latex Allergy:  [x]NO      []YES  Preferred Language for Healthcare:   [x]English       []other:    Functional Scale:           Date assessed:  FOTO physical FS primary measure score = 48     23    Pain level:  0/10     SUBJECTIVE: Pt reports overall wrist/hand/neck doing about the same. Over the past couple of days he feels his pinky finger might have gotten a little weaker but not sure if it actually has.      OBJECTIVE:   Observation:   Test measurements:    :   Strength / Myotomes Left Right   Cervical Flexion (C1-2)       Cervical Side-bending (C3)       Shoulder Shrug (C4)       Shoulder Flex 4+ 4+   Shoulder Scap Shoulder Abduction (C5) 5 5   Shoulder ER 5 4+   Shoulder IR 5 5   Biceps (C6) 5 5   Triceps (C7) 4 4+   Wrist Extension (C6) 5 5   Wrist Flexion (C7) 4- 5    (avg 3 trials) 19 lbs 89 lbs   Key  (avg 3 trials) 5 lbs 17 lbs   Thumb Abduction (C8) 3+ 5   Finger Abduction (T1)          1/19: Pt unable to demonstrate thumb ABD due to muscle weakness  1/9 -  (in #): R: 90, 98, 90, L: 18, 19, 18; Thumb Pad Pinch: R: 19, 18, 19, L: 5, 4, 5  1/4 - Median / Ulnar ULTT + for tension, discomfort (not pain) on LUE, all negative R    RESTRICTIONS/PRECAUTIONS: Carpal tunnel and cubital tunnel release in 2017; recent EMG showing continued issues at C8-T1, cubital tunnel and median nerve at carpal tunnel; Recent JIMMIE in cervical spine    Exercises/Interventions:   Therapeutic Exercise (03833) Resistance / level Sets/sec Reps Notes     Tabletop to Clawhand   Opposition all fingers   Flexbar:  Supination  Pronation  Wrist Extension  Wrist Flexion  Opening top with 3 jaw cher     Green  Green  Green  Green  Red     25x  25x  25x  25x  x15   Theraband green finger [place] ext with contact at distal fingers   Rubber band:  1st/2nd finger abduction  4th/5th finger abduction  2nd/3rd finger abduction  1st/Thumb abduction UBE \"Mr.  \" - up Danella Roosevelt  - palms up / down Weighted bar:  supination/pronation  RD/UD 2 weights distally   1  1   30  30 1/9 - replaced weight pron/sup, ^1/19 reps   Push-Ups - Incline on Table Juxtapositioner Kevin Anthony  - Neutral  - Pronated [employer] [place] Press 1 20 Webymaster        Therapeutic Activities (71177)                                                 NMR re-education (16666)       Digiflex - digital progression    Digiflex - gripping Powerweb:  Splayed Fingers to one point    One point to splayed fingers    Supination    Pronation     Green    Green    Yellow    Yellow   1    1    1    1   20    20    20    20 1/11   Pincer  Velcro Cubes:  Remove & Place Finger Adduction Isometric:  Thumb/1st  1st/2nd  2nd/3rd  3rd/4th  4th/5th  Farmer's Carry   5# head of weight hold  6# head of weight hold   100 ft    100 ft 4  1    1    1   1/11 -  would slip with 15#    's carry 5# ' X2 laps 1/26   Farmer's Carry holding weights in between fingers Squats holding sport cord in key  1 yellow 2 10 1/26 standing on mid cord. Single arm row with CC and pronation to supination 3 plates 2 10 1/82   Double arm row with straps and pinch  2 plates 2 10 8/72   Tricep ext with straps 3 plates 2 10 7/93   D2 PNF Ext Bent over row                      Manual Intervention (87615)        Cerv mobs:  Sideglides L to R, R to L  Cervical traction  Cervical PA Thoracic mobs/manip CT manip Rib mobilizations STM Median/ulnar nerve glides   Modalities:     Patient education:  -pt educated on diagnosis, prognosis and expectations for rehab  -all pt questions were answered    Home Exercise Program:  Access Code: [email or web address]  URL: [email or web address]  Date: 01/09/2023  Prepared by: Robert Bills    Exercises  Seated Wrist Flexion with Dumbbell - 1 x daily - 7 x weekly - 3 sets - 10 reps  Seated Wrist Extension with Dumbbell - 1 x daily - 7 x weekly - 3 sets - 10 reps  Tip Pinch with Putty - 1 x daily - 7 x weekly - 10 reps  Key Pinch with Putty - 1 x daily - 7 x weekly - 20 reps  Thumb Opposition with Putty - 1 x daily - 7 x weekly - 20 reps  Seated Finger Adduction with Putty - 1 x daily - 7 x weekly - 2 sets - 10 reps  Ulnar Nerve Flossing - 2 x daily - 7 x weekly - 2 sets - 10 reps  Median Nerve Flossing - Tray - 2 x daily - 7 x weekly - 1 sets - 10 reps  Wrist Tendon Gliding - 1 x daily - 7 x weekly - 3 sets - 10 reps  Resisted Finger Abduction - Index and Middle - 1 x daily - 7 x weekly - 2 sets - 15 reps  Resisted Finger Abduction - Index and Small - 1 x daily - 7 x weekly - 2 sets - 15 reps    1/4 - provided rubber bands for finger abduction; recommended tabletop to clawhand, wrist flex/ext/sup/pron with weight, pad to pad contact (thumb through 5th digit), gripping (with ulnar and median biases).    Access Code: [email or web address]  URL: [email or web address]  Date: 12/27/2022  Prepared by: Brad Guzman    Exercises  Seated Wrist Flexion with Dumbbell - 1 x daily - 7 x weekly - 3 sets - 10 reps  Seated Wrist Extension with Dumbbell - 1 x daily - 7 x weekly - 3 sets - 10 reps  Tip Pinch with Putty - 1 x daily - 7 x weekly - 10 reps  Key Pinch with Putty - 1 x daily - 7 x weekly - 20 reps  Thumb Opposition with Putty - 1 x daily - 7 x weekly - 20 reps    Therapeutic Exercise and NMR EXR  [x] (28244) Provided verbal/tactile cueing for activities related to strengthening, flexibility, endurance, ROM  for improvements in cervical, postural, scapular, scapulothoracic and UE control with self care, reaching, carrying, lifting, house/yardwork, driving/computer work.    [x] (77293) Provided verbal/tactile cueing for activities related to improving balance, coordination, kinesthetic sense, posture, motor skill, proprioception  to assist with cervical, scapular, scapulothoracic and UE control with self care, reaching, carrying, lifting, house/yardwork, driving/computer work.  [] (32465) Therapist is in constant attendance of 2 or more patients providing skilled therapy interventions, but not providing any significant amount of measurable one-on-one time to either patient, for improvements in cervical, scapular, scapulothoracic and UE control with self care, reaching, carrying, lifting, house/yardwork, driving, computer work.     Therapeutic Activities:    [] (89630 or 29776) Provided verbal/tactile cueing for activities related to improving balance, coordination, kinesthetic sense, posture, motor skill, proprioception and motor activation to allow for proper function of cervical, scapular, scapulothoracic and UE control with self care, carrying, lifting, driving/computer work.     Home Exercise Program:   [] (78032) Reviewed/Progressed HEP activities related to strengthening, flexibility, endurance, ROM of cervical, scapular, scapulothoracic and UE control with self care, reaching, carrying, lifting, house/yardwork, driving/computer work  [] (79703) Reviewed/Progressed HEP activities related to improving balance, coordination, kinesthetic sense, posture, motor skill, proprioception of cervical, scapular, scapulothoracic and UE control with self care, reaching, carrying, lifting, house/yardwork, driving/computer work      Manual Treatments:  PROM / STM / Oscillations-Mobs:  G-I, II, III, IV (PA's, Inf., Post.)  [] (93165) Provided manual therapy to mobilize soft tissue/joints of cervical/CT, scapular GHJ and UE for the purpose of decreasing headache, modulating pain, promoting relaxation,  increasing ROM, reducing/eliminating soft tissue swelling/inflammation/restriction, improving soft tissue extensibility and allowing for proper ROM for normal function with self care, reaching, carrying, lifting, house/yardwork, driving/computer work    Charges:  Timed Code Treatment Minutes: 43   Total Treatment Minutes: 43     [] EVAL - LOW (52024)   [] EVAL - MOD (62378)  [] EVAL - HIGH (27232)  [] RE-EVAL (04984)  [x] PF(48283) x 1      [] Ionto  [x] NMR (15707) x 2       [] Vaso  [] Manual (20235) x      [] Ultrasound  [] TA x        [] Mech Traction (09215)  [] Aquatic Therapy x     [] ES (un) (07348):   [] Home Management Training x  [] ES(attended) (27163)   [] Dry Needling 1-2 muscles (66760):  [] Dry Needling 3+ muscles (973990  [] Group:      [] Other:     GOALS:  Patient stated goal: improve L UE usage during work tasks as well as decrease N/T   [] Progressing: [] Met: [] Not Met: [] Adjusted    Therapist goals for Patient:   Short Term Goals: To be achieved in: 2 weeks  1. Independent in HEP and progression per patient tolerance, in order to prevent re-injury. [] Progressing: [] Met: [] Not Met: [] Adjusted  2. Patient will have a decrease in pain to facilitate improvement in movement, function, and ADLs as indicated by Functional Deficits. [] Progressing: [] Met: [] Not Met: [] Adjusted    Long Term Goals: To be achieved in: 8 weeks  1. FOTO score of at least - to assist with reaching prior level of function. [] Progressing: [] Met: [] Not Met: [] Adjusted  2. Patient will demonstrate increased AROM to [facility] of cervical/thoracic spine to allow for proper joint functioning as needed for full work tasks without limitations or restrictions   [] Progressing: [] Met: [] Not Met: [] Adjusted  3. Patient will demonstrate an increase in postural awareness and control and increased L  strength to within 15 lbs of R  strength to improve L UE usage during IADL's and work activity. [] Progressing: [] Met: [] Not Met: [] Adjusted  4. Patient will return to functional activities including trying shoes without increased symptoms or restriction with L hand dexterity. [] Progressing: [] Met: [] Not Met: [] Adjusted    Overall Progression Towards Functional goals/ Treatment Progress Update:  [] Patient is progressing as expected towards functional goals listed. [] Progression is slowed due to complexities/Impairments listed. [] Progression has been slowed due to co-morbidities. [x] Plan just implemented, too soon to assess goals progression <30days   [] Goals require adjustment due to lack of progress  [] Patient is not progressing as expected and requires additional follow up with physician  [] Other    Persisting Functional Limitations/Impairments:  []Sitting []Standing   []Walking []Squatting/bending    []Stairs [x]ADL's    []Transfers []Reaching  []Housework [x]Lifting  []Driving [x]Job related tasks  []Sports/Recreation  []Sleeping  []Other:    ASSESSMENT:  Modifications with session today, continued progressions as noted to work on various UE strength/control interventions with variations with gripping strategies.  Pt needing to repeatedly readjust  on L as with slight increase in fatigue pt loosing the ability to maintain appropriate gripping technique which required to keep weights/resistance with interventions lower. Treatment/Activity Tolerance:  [x] Patient able to complete tx  [] Patient limited by fatique  [] Patient limited by pain   [] Patient limited by other medical complications  [] Other:     Prognosis: [x] Good [] Fair  [] Poor    Patient Requires Follow-up: [x] Yes  [] No    PLAN: See eval. PT 1-2x / week for 8 weeks. [x] Continue per plan of care [] Alter current plan (see comments)  [] Plan of care initiated [] Hold pending MD visit [] Discharge    Electronically signed by: Ana Rosa Ford, PT DPT, OCS, OMT-C      Note: If patient does not return for scheduled/ recommended follow up visits, this note will serve as a discharge from care along with most recent update on progress.

## 2023-01-27 ENCOUNTER — OFFICE VISIT (OUTPATIENT)
Dept: FAMILY MEDICINE CLINIC | Age: 63
End: 2023-01-27
Payer: COMMERCIAL

## 2023-01-27 VITALS
HEART RATE: 98 BPM | TEMPERATURE: 98.9 F | DIASTOLIC BLOOD PRESSURE: 80 MMHG | WEIGHT: 148.6 LBS | BODY MASS INDEX: 24.73 KG/M2 | SYSTOLIC BLOOD PRESSURE: 130 MMHG | OXYGEN SATURATION: 98 %

## 2023-01-27 DIAGNOSIS — H60.331 ACUTE SWIMMER'S EAR OF RIGHT SIDE: Primary | ICD-10-CM

## 2023-01-27 PROCEDURE — 3017F COLORECTAL CA SCREEN DOC REV: CPT | Performed by: NURSE PRACTITIONER

## 2023-01-27 PROCEDURE — 99213 OFFICE O/P EST LOW 20 MIN: CPT | Performed by: NURSE PRACTITIONER

## 2023-01-27 PROCEDURE — G8427 DOCREV CUR MEDS BY ELIG CLIN: HCPCS | Performed by: NURSE PRACTITIONER

## 2023-01-27 PROCEDURE — G8484 FLU IMMUNIZE NO ADMIN: HCPCS | Performed by: NURSE PRACTITIONER

## 2023-01-27 PROCEDURE — G8420 CALC BMI NORM PARAMETERS: HCPCS | Performed by: NURSE PRACTITIONER

## 2023-01-27 PROCEDURE — 4130F TOPICAL PREP RX AOE: CPT | Performed by: NURSE PRACTITIONER

## 2023-01-27 PROCEDURE — 4004F PT TOBACCO SCREEN RCVD TLK: CPT | Performed by: NURSE PRACTITIONER

## 2023-01-27 RX ORDER — OFLOXACIN 3 MG/ML
1 SOLUTION/ DROPS OPHTHALMIC 4 TIMES DAILY
Qty: 1 EACH | Refills: 0 | Status: SHIPPED | OUTPATIENT
Start: 2023-01-27 | End: 2023-02-06

## 2023-01-27 RX ORDER — CIPROFLOXACIN AND DEXAMETHASONE 3; 1 MG/ML; MG/ML
4 SUSPENSION/ DROPS AURICULAR (OTIC) 2 TIMES DAILY
Qty: 1 EACH | Refills: 0 | Status: SHIPPED | OUTPATIENT
Start: 2023-01-27 | End: 2023-01-27

## 2023-01-27 ASSESSMENT — ENCOUNTER SYMPTOMS
SHORTNESS OF BREATH: 0
VOMITING: 0
COUGH: 0
DIARRHEA: 0
NAUSEA: 0

## 2023-01-27 ASSESSMENT — PATIENT HEALTH QUESTIONNAIRE - PHQ9
SUM OF ALL RESPONSES TO PHQ QUESTIONS 1-9: 0
1. LITTLE INTEREST OR PLEASURE IN DOING THINGS: 0
SUM OF ALL RESPONSES TO PHQ QUESTIONS 1-9: 0
SUM OF ALL RESPONSES TO PHQ9 QUESTIONS 1 & 2: 0
SUM OF ALL RESPONSES TO PHQ QUESTIONS 1-9: 0
SUM OF ALL RESPONSES TO PHQ QUESTIONS 1-9: 0
2. FEELING DOWN, DEPRESSED OR HOPELESS: 0

## 2023-02-14 ENCOUNTER — TELEPHONE (OUTPATIENT)
Dept: FAMILY MEDICINE CLINIC | Age: 63
End: 2023-02-14

## 2023-02-14 NOTE — TELEPHONE ENCOUNTER
Pt called and states he was diagnosed with an ear infection in right ear during recent Red visit with SW. He states that the medication didn't fully take care of the problem, and pain is returning. States SW told him to call back in if it didn't work, so he would like to have something called in for him.     [id number] [address]   [address] [place] [address]., [facility] [address]   Phone:  [phone]  Fax:  [phone]

## 2023-02-23 RX ORDER — GABAPENTIN 300 MG/1
300 CAPSULE ORAL NIGHTLY
Qty: 30 CAPSULE | Refills: 0 | Status: SHIPPED | OUTPATIENT
Start: 2023-02-23 | End: 2023-03-25

## 2023-02-27 ENCOUNTER — TELEPHONE (OUTPATIENT)
Dept: FAMILY MEDICINE CLINIC | Age: 63
End: 2023-02-27

## 2023-02-27 DIAGNOSIS — H60.331 ACUTE SWIMMER'S EAR OF RIGHT SIDE: Primary | ICD-10-CM

## 2023-02-27 NOTE — TELEPHONE ENCOUNTER
Pt called and states that his ear/hearing hasn't improved in the couple of weeks he's been receiving treatment. He would like a referral for an ENT unless Ardath Doing thinks he should try another course of medication.   Please advise

## 2023-03-03 ENCOUNTER — OFFICE VISIT (OUTPATIENT)
Dept: ENT CLINIC | Age: 63
End: 2023-03-03
Payer: COMMERCIAL

## 2023-03-03 VITALS
OXYGEN SATURATION: 96 % | HEIGHT: 65 IN | SYSTOLIC BLOOD PRESSURE: 126 MMHG | BODY MASS INDEX: 24.99 KG/M2 | HEART RATE: 96 BPM | TEMPERATURE: 97.7 F | WEIGHT: 150 LBS | DIASTOLIC BLOOD PRESSURE: 67 MMHG

## 2023-03-03 DIAGNOSIS — H65.191 ACUTE OTITIS MEDIA WITH EFFUSION OF RIGHT EAR: ICD-10-CM

## 2023-03-03 DIAGNOSIS — F17.290 CIGAR SMOKER: ICD-10-CM

## 2023-03-03 DIAGNOSIS — H90.11 CONDUCTIVE HEARING LOSS OF RIGHT EAR, UNSPECIFIED HEARING STATUS ON CONTRALATERAL SIDE: Primary | ICD-10-CM

## 2023-03-03 PROCEDURE — 3017F COLORECTAL CA SCREEN DOC REV: CPT | Performed by: STUDENT IN AN ORGANIZED HEALTH CARE EDUCATION/TRAINING PROGRAM

## 2023-03-03 PROCEDURE — G8484 FLU IMMUNIZE NO ADMIN: HCPCS | Performed by: STUDENT IN AN ORGANIZED HEALTH CARE EDUCATION/TRAINING PROGRAM

## 2023-03-03 PROCEDURE — 4004F PT TOBACCO SCREEN RCVD TLK: CPT | Performed by: STUDENT IN AN ORGANIZED HEALTH CARE EDUCATION/TRAINING PROGRAM

## 2023-03-03 PROCEDURE — 99204 OFFICE O/P NEW MOD 45 MIN: CPT | Performed by: STUDENT IN AN ORGANIZED HEALTH CARE EDUCATION/TRAINING PROGRAM

## 2023-03-03 PROCEDURE — G8427 DOCREV CUR MEDS BY ELIG CLIN: HCPCS | Performed by: STUDENT IN AN ORGANIZED HEALTH CARE EDUCATION/TRAINING PROGRAM

## 2023-03-03 PROCEDURE — G8420 CALC BMI NORM PARAMETERS: HCPCS | Performed by: STUDENT IN AN ORGANIZED HEALTH CARE EDUCATION/TRAINING PROGRAM

## 2023-03-03 RX ORDER — METHYLPREDNISOLONE 4 MG/1
TABLET ORAL
Qty: 1 KIT | Refills: 0 | Status: SHIPPED | OUTPATIENT
Start: 2023-03-03

## 2023-03-03 RX ORDER — FLUTICASONE PROPIONATE 50 MCG
2 SPRAY, SUSPENSION (ML) NASAL DAILY
Qty: 48 G | Refills: 1 | Status: SHIPPED | OUTPATIENT
Start: 2023-03-03

## 2023-03-03 NOTE — PROGRESS NOTES
[facility]  NEW PATIENT HISTORY AND PHYSICAL NOTE      Patient Name: Lyssa Rivas  Medical Record Number:  [id number]  Primary Care Physician:  Dominique Laguna MD    ChiefComplaint     Chief Complaint   Patient presents with    Ear Problem     Rt ear hard to hear out of it        History of Present Illness     Lyssa Rivas is an 58 y.o. male presenting with right ear issues. Unable to hear out of right ear. Started 2 months ago. Started with right ear clogged, then transitioned to pain and swelling along the right side of face. Pain has resolved. Was placed on bactrim and ofoxacin otic ggt. No hx of DM. No nasal sprays. Occasional claritin for allergies. Smokes a cigar nightly. No hx of ear surgery. No hx of recurrent ear infections. Past Medical History     No past medical history on file.     Past Surgical History     Past Surgical History:   Procedure Laterality Date    APPENDECTOMY      CARPAL TUNNEL RELEASE Left 12/21/2017    Left  Carpal Tunnel Release & Left Ulnar Nerve decompression at the Cubital Tunnel     COLONOSCOPY      ELBOW SURGERY      left ulnar nerve entrapment    KNEE ARTHROPLASTY Right 03/28/2017    RIGHT PARTIAL KNEE ARTHROPLASTY           SHOULDER SURGERY Right        Family History     Family History   Problem Relation Age of Onset    Heart Disease Brother 64    Hypertension Father     Stroke Father     Hypertension Brother     Alcohol Abuse Brother     Other Brother         Factor V Leiden    Other Mother         dementia       Social History     Social History     Tobacco Use    Smoking status: Some Days     Packs/day: 1.00     Years: 20.00     Pack years: 20.00     Types: Cigars, Cigarettes    Smokeless tobacco: Never    Tobacco comments:     one cigar daily    Substance Use Topics    Alcohol use: Yes     Comment: heavy, 5/day beer    Drug use: No        Allergies     No Known Allergies    Medications     Current Outpatient Medications   Medication Sig Dispense Refill    methylPREDNISolone (MEDROL DOSEPACK) 4 MG tablet Take by mouth as instructed. Take with food. 1 kit 0    fluticasone (FLONASE) 50 MCG/ACT nasal spray 2 sprays by Each Nostril route daily 48 g 1    sildenafil (REVATIO) 20 MG tablet TAKE 1 TO 3 TABLETS BY MOUTH DAILY AS NEEDED 60 tablet 5    ibuprofen (IBU) 400 MG tablet Take 1 tablet by mouth every 6 hours as needed for Pain 20 tablet 0    Cholecalciferol (VITAMIN D) 50 MCG (2000 UT) CAPS capsule Take 1 capsule by mouth daily      b complex vitamins capsule Take 1 capsule by mouth daily      Garlic 8043 MG TABS Take by mouth      Multiple Vitamins-Minerals (MULTIVITAMIN ADULT PO) Take by mouth      gabapentin (NEURONTIN) 300 MG capsule Take 1 capsule by mouth nightly for 30 days. Intended supply: 30 days (Patient not taking: Reported on 3/3/2023) 30 capsule 0     No current facility-administered medications for this visit. Review of Systems     REVIEW OF SYSTEMS    See HPI Above    PhysicalExam     Vitals:    03/03/23 1334   BP: 126/67   Pulse: 96   Temp: 97.7 °F (36.5 °C)   TempSrc: Temporal   SpO2: 96%   Weight: 150 lb (68 kg)   Height: 5' 5\" (1.651 m)       PHYSICAL EXAM  /67   Pulse 96   Temp 97.7 °F (36.5 °C) (Temporal)   Ht 5' 5\" (1.651 m)   Wt 150 lb (68 kg)   SpO2 96%   BMI 24.96 kg/m²     GENERAL: No acute distress, alert and oriented  EYES: EOMI, Anti-icteric  NOSE: On anterior rhinoscopy there is no epistaxis, nasal mucosa moist and normal appearing, no purulent drainage. EARS: Normal external appearance; on portable otomicroscopy:     -Ad: External auditory canal without stenosis, tympanic membrane with amirah colored middle ear effusion. No otorrhea.      -As: External auditory canal without stenosis, tympanic membrane clear, no middle ear effusions or retractions.    Pneumatic otoscopy: Bilateral tympanic membranes mobile pneumatic otoscopy  FACE: HB 1/6 bilaterally, symmetric appearing, sensation equal bilaterally  ORAL CAVITY: No masses or lesions visualized or palpated, uvula is midline, moist mucous membranes, no oropharyngeal masses or oropharyngeal obstruction  NECK: Normal range of motion, no thyromegaly, trachea is midline, no palpable lymphadenopathy or neck masses, no crepitus  NEURO: Cranial Nerves 2, 3, 4, 5, 6, 7, 11, 12 grossly intact bilaterally     I have performed a head and neck physical exam personally or was physically present during the key or critical portions of the service. Procedure     Nasal Endoscopy, Diagnostic (29596)    Pre-op: right middle ear effusion, current cigar smoker  Post-op: Same  Procedure: Nasal endoscopy    After obtaining verbal consent from the patient 2% lidocaine with afrin was sprayed into the nasal cavities. After allowing a time for anesthesia, a flexible endoscope was used to examine the nasal septum, turbinates, and mucosal tissue. The middle meatus and sphenoethmoid recess was examined bilaterally. There were no complications. Pertinent findings include:   No polyps or intranasal masses identified in the right nasal passages. No excessive edema or purulence within the bilateral nasal passages. No nasopharyngeal masses. Torus tubarius without obstruction bilaterally. *The patient tolerated the procedure well without complication. I attest that I was present for and did the entire procedure myself. Assessment and Plan     1. Acute otitis media with effusion of right ear  - methylPREDNISolone (MEDROL DOSEPACK) 4 MG tablet; Take by mouth as instructed. Take with food. Dispense: 1 kit; Refill: 0  - fluticasone (FLONASE) 50 MCG/ACT nasal spray; 2 sprays by Each Nostril route daily  Dispense: 48 g; Refill: 1  - Ambulatory referral to Audiology  - Hearing test at follow-up 1 month, will place tympanostomy tube if effusion still present    2.  Conductive hearing loss of right ear, unspecified hearing status on contralateral side  - Ambulatory referral to Audiology    3. Cigar smoker  - No obstructive masses on endoscopy    Follow Up     Return in about 1 month (around 4/3/2023) for audiogram prior to appointment. Yolanda Shepherd   Department of Otolaryngology/Head & Neck Surgery  3/3/23    Medical Decision Making: The following items were considered in medical decision making:  Independent review of images  Review / order clinical lab tests  Review / order radiology tests  Decision to obtain old records    This note was generated completely or in part utilizing Dragon dictation speech recognition software. Occasionally, words are mistranscribed and despite editing, the text may contain inaccuracies due to incorrect word recognition. If further clarification is needed please contact the office at [phone].

## 2023-03-20 ENCOUNTER — TELEPHONE (OUTPATIENT)
Dept: ENT CLINIC | Age: 63
End: 2023-03-20

## 2023-03-20 NOTE — TELEPHONE ENCOUNTER
Pt. States medication slightly helped he still can't barley hear out of right ear with little pain. He is requesting a refill. Refill Request: Medrol Dose Pack    Last Office Visit:  3/3/2023     Next Scheduled Visit : 4/3/2023     Medication Requested:    Pharmacy:    [id number] [phone]  [address] [place] [address] [place]   Sharee Adeline [address]  Phone: [phone] Fax: [phone]

## 2023-03-22 NOTE — TELEPHONE ENCOUNTER
Please call the patient back and let him know that I would like to hold off on trying another Medrol Dosepak at this time. Have him continue the Flonase use and we will see him back on 4/3 with an audiogram at that time. Sometimes these can take a while to get better on their own, and if it is not better at that time we can consider placing ear tube in the office to help drain the fluid behind his eardrum.

## 2023-04-03 ENCOUNTER — PROCEDURE VISIT (OUTPATIENT)
Dept: AUDIOLOGY | Age: 63
End: 2023-04-03
Payer: COMMERCIAL

## 2023-04-03 ENCOUNTER — OFFICE VISIT (OUTPATIENT)
Dept: ENT CLINIC | Age: 63
End: 2023-04-03
Payer: COMMERCIAL

## 2023-04-03 VITALS
BODY MASS INDEX: 24.66 KG/M2 | HEIGHT: 65 IN | TEMPERATURE: 97.5 F | SYSTOLIC BLOOD PRESSURE: 124 MMHG | DIASTOLIC BLOOD PRESSURE: 69 MMHG | HEART RATE: 81 BPM | OXYGEN SATURATION: 97 % | WEIGHT: 148 LBS

## 2023-04-03 DIAGNOSIS — H90.11 CONDUCTIVE HEARING LOSS OF RIGHT EAR, UNSPECIFIED HEARING STATUS ON CONTRALATERAL SIDE: ICD-10-CM

## 2023-04-03 DIAGNOSIS — H93.13 TINNITUS, BILATERAL: ICD-10-CM

## 2023-04-03 DIAGNOSIS — H90.A31 MIXED CONDUCTIVE AND SENSORINEURAL HEARING LOSS OF RIGHT EAR WITH RESTRICTED HEARING OF LEFT EAR: Primary | ICD-10-CM

## 2023-04-03 DIAGNOSIS — H90.A22 SENSORINEURAL HEARING LOSS (SNHL) OF LEFT EAR WITH RESTRICTED HEARING OF RIGHT EAR: ICD-10-CM

## 2023-04-03 DIAGNOSIS — H65.191 ACUTE OTITIS MEDIA WITH EFFUSION OF RIGHT EAR: Primary | ICD-10-CM

## 2023-04-03 PROCEDURE — 92567 TYMPANOMETRY: CPT | Performed by: AUDIOLOGIST

## 2023-04-03 PROCEDURE — 92557 COMPREHENSIVE HEARING TEST: CPT | Performed by: AUDIOLOGIST

## 2023-04-03 PROCEDURE — 69433 CREATE EARDRUM OPENING: CPT | Performed by: STUDENT IN AN ORGANIZED HEALTH CARE EDUCATION/TRAINING PROGRAM

## 2023-04-03 RX ORDER — OFLOXACIN 3 MG/ML
SOLUTION/ DROPS OPHTHALMIC
Qty: 1 EACH | Refills: 0 | Status: SHIPPED | OUTPATIENT
Start: 2023-04-03

## 2023-04-03 NOTE — PATIENT INSTRUCTIONS
at any age. There are small sensory cells, called inner and outer hair cells, within the inner ear (cochlea). These cells process the loudness (intensity) and pitch (frequency) of sound and send the signal to the brain via our auditory nerve (vestibulocochlear nerve, cranial nerve VIII). When these cells are damaged, they can result in permanent hearing loss and/or tinnitus. The hair cells responsible for high frequency sounds, like birds chirping, are most likely to be damaged due to loud sounds. The high frequency sounds are also very important for our clarity and understanding of speech. OCCUPATIONAL NOISE EXPOSURE RECREATIONAL NOISE EXPOSURE   Some jobs may have exposure to loud sounds in the workplace. These jobs may include but are not limited to:  [address] [place] [address] Street settings  Manufacturing  Construction  Welding  Landscaping  Hairdressing/hairstyling  [address]   . .. And more! Many activities outside of work may cause permanent hearing loss. These activities may include but are not limited to:  Lawnmowers, leaf blowers  Farming equipment and animals (such as pigs squealing)  Chainsaws and other power tools  Playing musical instruments and/or singing  Listening to music too loudly - at concerts, through stereo, through ear buds or headphones  Attending sporting events  Attending fireworks shows or using fireworks at home  Use of firearms  . .. And more! REDUCE OR PROTECT YOUR EARS FROM NOISE EXPOSURE    To do your best to avoid noise-induced hearing loss, here are some tips:  Limit exposure to loud sounds. 85 dB (decibels) is safe for 8 hours. As sounds are louder, the length of time the sound is safe lessens. These numbers are cumulative across a 24-hour period.   (NIOSH and CDC, 2002)  85 dB is safe for 8 hours  88 dB is safe for 4 hours  91 dB is safe for 2 hours  94 dB is safe for 1 hour  97 dB is safe for 30 minutes  100 dB is safe for 15 minutes  103 dB is safe for 7.5

## 2023-04-03 NOTE — PROGRESS NOTES
moderately-severe sensorineural hearing loss  Speech Recognition Threshold: 15 dB HL  Word Recognition: Excellent 100%, based on NU-6 25-word list at 60 dBHL using recorded speech stimuli. Tympanometry: Normal peak pressure and compliance, Type A tympanogram, consistent with normal middle ear function. Reliability: Good   Transducer: Inserts    See scanned audiogram dated 4/3/2023  for results. PATIENT EDUCATION:       The following items were discussed with the patient:   - Good Communication Strategies  - Tinnitus Management Strategies    - Noise-Induced Hearing Loss and use of Hearing Protection Devices (HPDs)     Educational information was shared in the After Visit Summary. RECOMMENDATIONS:                                                                                                                                                                                                                                                            The following items are recommended based on patient report and results from today's appointment:   - Continue medical follow-up with Willow Jones DO.   - Retest hearing as medically indicated and/or sooner if a change in hearing is noted. - Utilize \"Good Communication Strategies\" as discussed to assist in speech understanding with communication partners. - Maintain a sound enriched environment to assist in the management of tinnitus symptoms.  - Use hearing protection devices (HPDs), such as protective ear muffs and ear plugs, when exposed to dangerous sound levels.        Claudette Butts  Audiologist    Chart CC'd to: Willow Jones DO      Degree of   Hearing Sensitivity dB Range   Within Normal Limits (WNL) 0 - 20   Mild 20 - 40   Moderate 40 - 55   Moderately-Severe 55 - 70   Severe 70 - 90   Profound 90 +

## 2023-04-03 NOTE — PROGRESS NOTES
portions of the service. Procedure     Procedure: Myringotomy with placement of right-sided pressure equalization tube (CPT 25190)    Pre op Dx: right-sided serous otitis media  Post Op: Same  Procedure: right-sided myringotomy with tympanostomy tube placement  Consent: Written obtained  Surgeon: Dr. Anastasia Hicks    Description of procedure:    Consent was obtained after discussion concerning the risk, benefits, and alternatives of the procedure were discussed with the patient. Discussed risk include but are not limited to persistent tympanic membrane perforation, bleeding, infection, hearing loss. Patient was placed in the reclined position the procedure chair. With the use of an operating microscopy and an otologic speculum, the right external auditory canal was examined. Any cerumen was removed as necessary using instrumentation. The tympanic membrane was visualize, revealing intact tympanic membrane with serous effusion. Topical phenol was applied to the anterior-inferior quadrant of right-sided tympanic membrane. A myringotomy knife was used to make a radial incision in the tympanic membrane. A 3-suction was used to suction out serous fluid from the middle ear space. An alligator forceps was used to place a Paparella type PE tube in the myringotomy, followed by 4 drops of Ciprodex otic solution. A cottonball was placed in the distal aspect of the ear canal.  The patient tolerated the procedure well. There were no complications with the procedure. Data/Imaging Review        Media Information      Document Information    [facility] Clinical:  Audiology   audiogram and tymp 4/3/23   04/03/2023   Attached To:   Procedure visit on 4/3/23 with Bryan Davian, [place], [place] [facility] Audiology       Assessment and Plan     1. Acute otitis media with effusion of right ear  2.  Conductive hearing loss of right ear, unspecified hearing status on

## 2023-04-12 ENCOUNTER — TELEPHONE (OUTPATIENT)
Dept: ORTHOPEDIC SURGERY | Age: 63
End: 2023-04-12

## 2023-04-26 ENCOUNTER — TELEPHONE (OUTPATIENT)
Dept: ORTHOPEDIC SURGERY | Age: 63
End: 2023-04-26

## 2023-04-26 NOTE — TELEPHONE ENCOUNTER
Per [employer] [facility], code 94803 does not require a prior authorization.

## 2023-04-28 ENCOUNTER — TELEPHONE (OUTPATIENT)
Dept: ORTHOPEDIC SURGERY | Age: 63
End: 2023-04-28

## 2023-05-01 NOTE — PROGRESS NOTES
[facility] Pre-Admission Testing Electronic Communication Worksheet for OR/ENDO Procedures        Patient: Nicola Dumas    DOS:  5/4/23    Arrival Time: 1330    Surgery Time:1510    Meds to Bed:  [] YES    [x]  NO    Transportation Confirmed: [x] YES    []  NO    History and Physical:  [] YES    [x]  NO  [] N/A  If yes, please list doctor or Urgent Care and date of H&P: In [facility] Dr Sy Ribera -cleared    Additional Clearance(Cardiac, Pulmonary, etc):  [] YES    [x]  NO    Pre-Admission Testing Visit:  [] YES    [x]  NO If no, do labs/testing need to be done DOS? [] YES    []  NO    Medication Reconciliation Complete:  [x] YES    []  NO        Additional Notes:                Interview Complete: [x] YES    []  NO          Bill Back RN  2:49 PM C-diff Questionnaire:     * Admitted with diarrhea? [] YES    [x]  NO     *Prior history of C-Diff. In last 3 months? [] YES    [x]  NO     *Antibiotic use in the past 6-8 weeks? [x]  NO    []  YES      If yes, which: REASON_________________     *Prior hospitalization or nursing home in the last month? []  YES    [x]  NO     SAFETY FIRST. .call before you fall    [facility] time______1330______        Surgery time____1510________    Do not eat or drink anything after 12:00 midnight prior to your surgery. This includes water chewing gum, mints and ice chips- the Day of Surgery. You may brush your teeth and gargle the morning of your surgery, but do not swallow the water     Please see your family doctor/pediatrician for a history and physical and/or questions concerning medications. Bring any test results/reports from your physicians office.    If you are under the care of a heart doctor or specialist doctor, please be aware that you may be asked to them for clearance    You may be asked to stop blood thinners such as Coumadin, Plavix, Fragmin, Lovenox, etc., or any anti-inflammatories such

## 2023-05-03 ENCOUNTER — OFFICE VISIT (OUTPATIENT)
Dept: FAMILY MEDICINE CLINIC | Age: 63
End: 2023-05-03
Payer: COMMERCIAL

## 2023-05-03 ENCOUNTER — ANESTHESIA EVENT (OUTPATIENT)
Dept: OPERATING ROOM | Age: 63
End: 2023-05-03

## 2023-05-03 VITALS
HEART RATE: 74 BPM | OXYGEN SATURATION: 99 % | SYSTOLIC BLOOD PRESSURE: 120 MMHG | BODY MASS INDEX: 24.83 KG/M2 | WEIGHT: 149 LBS | TEMPERATURE: 97.2 F | DIASTOLIC BLOOD PRESSURE: 80 MMHG | HEIGHT: 65 IN

## 2023-05-03 DIAGNOSIS — G56.22 CUBITAL TUNNEL SYNDROME ON LEFT: Primary | ICD-10-CM

## 2023-05-03 DIAGNOSIS — Z01.818 PRE-OP EXAM: ICD-10-CM

## 2023-05-03 PROCEDURE — G8420 CALC BMI NORM PARAMETERS: HCPCS | Performed by: FAMILY MEDICINE

## 2023-05-03 PROCEDURE — 99242 OFF/OP CONSLTJ NEW/EST SF 20: CPT | Performed by: FAMILY MEDICINE

## 2023-05-03 PROCEDURE — G8427 DOCREV CUR MEDS BY ELIG CLIN: HCPCS | Performed by: FAMILY MEDICINE

## 2023-05-03 SDOH — ECONOMIC STABILITY: FOOD INSECURITY: WITHIN THE PAST 12 MONTHS, YOU WORRIED THAT YOUR FOOD WOULD RUN OUT BEFORE YOU GOT MONEY TO BUY MORE.: NEVER TRUE

## 2023-05-03 SDOH — ECONOMIC STABILITY: INCOME INSECURITY: HOW HARD IS IT FOR YOU TO PAY FOR THE VERY BASICS LIKE FOOD, HOUSING, MEDICAL CARE, AND HEATING?: NOT HARD AT ALL

## 2023-05-03 SDOH — ECONOMIC STABILITY: FOOD INSECURITY: WITHIN THE PAST 12 MONTHS, THE FOOD YOU BOUGHT JUST DIDN'T LAST AND YOU DIDN'T HAVE MONEY TO GET MORE.: NEVER TRUE

## 2023-05-03 SDOH — ECONOMIC STABILITY: HOUSING INSECURITY
IN THE LAST 12 MONTHS, WAS THERE A TIME WHEN YOU DID NOT HAVE A STEADY PLACE TO SLEEP OR SLEPT IN A SHELTER (INCLUDING NOW)?: NO

## 2023-05-03 ASSESSMENT — ENCOUNTER SYMPTOMS
CONSTIPATION: 0
DIARRHEA: 0
SHORTNESS OF BREATH: 0
RHINORRHEA: 0
EYE ITCHING: 0
ABDOMINAL PAIN: 0
BACK PAIN: 0

## 2023-05-03 NOTE — PROGRESS NOTES
Preoperative Consultation    Jose Alfredo Salas  YOB: 1960    Mr. Aleksandr Montes De Oca presents to the office today for a preoperative consultation at the request of surgeon, Dr. Randa Marcos, who plans on performing left ulnar nerve decompression on May 4 at 1510.       Planned anesthesia: General   Known anesthesia problems: None   Bleeding risk: No recent or remote history of abnormal bleeding  Personal or FH of DVT/PE: No      Patient Active Problem List   Diagnosis    Cigar smoker    Macrocytosis without anemia    Cubital tunnel syndrome on left    Family history of clotting disorder    3/28/17 Partial  RIGHT knee arthroplasty     Chronic fatigue    Erectile dysfunction     Past Surgical History:   Procedure Laterality Date    APPENDECTOMY      CARPAL TUNNEL RELEASE Left 12/21/2017    Left  Carpal Tunnel Release & Left Ulnar Nerve decompression at the Cubital Tunnel     COLONOSCOPY      ELBOW SURGERY      left ulnar nerve entrapment    KNEE ARTHROPLASTY Right 03/28/2017    RIGHT PARTIAL KNEE ARTHROPLASTY           SHOULDER SURGERY Right        No Known Allergies  Outpatient Medications Marked as Taking for the 5/3/23 encounter (Office Visit) with Edin Beltran MD   Medication Sig Dispense Refill    fluticasone (FLONASE) 50 MCG/ACT nasal spray 2 sprays by Each Nostril route daily (Patient taking differently: 2 sprays by Each Nostril route as needed) 48 g 1    sildenafil (REVATIO) 20 MG tablet TAKE 1 TO 3 TABLETS BY MOUTH DAILY AS NEEDED 60 tablet 5    ibuprofen (IBU) 400 MG tablet Take 1 tablet by mouth every 6 hours as needed for Pain 20 tablet 0    b complex vitamins capsule Take 1 capsule by mouth daily      Garlic 8449 MG TABS Take by mouth      Multiple Vitamins-Minerals (MULTIVITAMIN ADULT PO) Take by mouth         Social History     Tobacco Use    Smoking status: Some Days     Packs/day: 1.00     Years: 20.00     Pack years: 20.00     Types: Cigars, Cigarettes    Smokeless tobacco: Never    Tobacco

## 2023-05-04 ENCOUNTER — HOSPITAL ENCOUNTER (OUTPATIENT)
Age: 63
Setting detail: OUTPATIENT SURGERY
Discharge: HOME | End: 2023-05-04
Attending: ORTHOPAEDIC SURGERY | Admitting: ORTHOPAEDIC SURGERY

## 2023-05-04 ENCOUNTER — ANESTHESIA (OUTPATIENT)
Dept: OPERATING ROOM | Age: 63
End: 2023-05-04

## 2023-05-04 VITALS
BODY MASS INDEX: 22.5 KG/M2 | RESPIRATION RATE: 16 BRPM | SYSTOLIC BLOOD PRESSURE: 142 MMHG | HEIGHT: 66 IN | OXYGEN SATURATION: 98 % | WEIGHT: 140 LBS | HEART RATE: 72 BPM | TEMPERATURE: 97.2 F | DIASTOLIC BLOOD PRESSURE: 84 MMHG

## 2023-05-04 PROCEDURE — A4217 STERILE WATER/SALINE, 500 ML: HCPCS | Performed by: ORTHOPAEDIC SURGERY

## 2023-05-04 PROCEDURE — 3700000000 HC ANESTHESIA ATTENDED CARE: Performed by: ORTHOPAEDIC SURGERY

## 2023-05-04 PROCEDURE — 7100000001 HC PACU RECOVERY - ADDTL 15 MIN: Performed by: ORTHOPAEDIC SURGERY

## 2023-05-04 PROCEDURE — 2500000003 HC RX 250 WO HCPCS: Performed by: NURSE ANESTHETIST, CERTIFIED REGISTERED

## 2023-05-04 PROCEDURE — 7100000011 HC PHASE II RECOVERY - ADDTL 15 MIN: Performed by: ORTHOPAEDIC SURGERY

## 2023-05-04 PROCEDURE — 7100000000 HC PACU RECOVERY - FIRST 15 MIN: Performed by: ORTHOPAEDIC SURGERY

## 2023-05-04 PROCEDURE — 6360000002 HC RX W HCPCS: Performed by: NURSE ANESTHETIST, CERTIFIED REGISTERED

## 2023-05-04 PROCEDURE — 2580000003 HC RX 258: Performed by: ORTHOPAEDIC SURGERY

## 2023-05-04 PROCEDURE — 3600000005 HC SURGERY LEVEL 5 BASE: Performed by: ORTHOPAEDIC SURGERY

## 2023-05-04 PROCEDURE — 2709999900 HC NON-CHARGEABLE SUPPLY: Performed by: ORTHOPAEDIC SURGERY

## 2023-05-04 PROCEDURE — 2500000003 HC RX 250 WO HCPCS: Performed by: ORTHOPAEDIC SURGERY

## 2023-05-04 PROCEDURE — 7100000010 HC PHASE II RECOVERY - FIRST 15 MIN: Performed by: ORTHOPAEDIC SURGERY

## 2023-05-04 PROCEDURE — 2580000003 HC RX 258: Performed by: ANESTHESIOLOGY

## 2023-05-04 PROCEDURE — 3700000001 HC ADD 15 MINUTES (ANESTHESIA): Performed by: ORTHOPAEDIC SURGERY

## 2023-05-04 PROCEDURE — 3600000015 HC SURGERY LEVEL 5 ADDTL 15MIN: Performed by: ORTHOPAEDIC SURGERY

## 2023-05-04 RX ORDER — PROPOFOL 10 MG/ML
INJECTION, EMULSION INTRAVENOUS PRN
Status: DISCONTINUED | OUTPATIENT
Start: 2023-05-04 | End: 2023-05-04 | Stop reason: SDUPTHER

## 2023-05-04 RX ORDER — MAGNESIUM HYDROXIDE 1200 MG/15ML
LIQUID ORAL CONTINUOUS PRN
Status: DISCONTINUED | OUTPATIENT
Start: 2023-05-04 | End: 2023-05-04 | Stop reason: HOSPADM

## 2023-05-04 RX ORDER — MEPERIDINE HYDROCHLORIDE 25 MG/ML
12.5 INJECTION INTRAMUSCULAR; INTRAVENOUS; SUBCUTANEOUS
Status: DISCONTINUED | OUTPATIENT
Start: 2023-05-04 | End: 2023-05-04 | Stop reason: HOSPADM

## 2023-05-04 RX ORDER — FENTANYL CITRATE 50 UG/ML
50 INJECTION, SOLUTION INTRAMUSCULAR; INTRAVENOUS EVERY 5 MIN PRN
Status: DISCONTINUED | OUTPATIENT
Start: 2023-05-04 | End: 2023-05-04 | Stop reason: HOSPADM

## 2023-05-04 RX ORDER — FENTANYL CITRATE 50 UG/ML
25 INJECTION, SOLUTION INTRAMUSCULAR; INTRAVENOUS EVERY 5 MIN PRN
Status: DISCONTINUED | OUTPATIENT
Start: 2023-05-04 | End: 2023-05-04 | Stop reason: HOSPADM

## 2023-05-04 RX ORDER — ONDANSETRON 2 MG/ML
INJECTION INTRAMUSCULAR; INTRAVENOUS PRN
Status: DISCONTINUED | OUTPATIENT
Start: 2023-05-04 | End: 2023-05-04 | Stop reason: SDUPTHER

## 2023-05-04 RX ORDER — DEXAMETHASONE SODIUM PHOSPHATE 4 MG/ML
INJECTION, SOLUTION INTRA-ARTICULAR; INTRALESIONAL; INTRAMUSCULAR; INTRAVENOUS; SOFT TISSUE PRN
Status: DISCONTINUED | OUTPATIENT
Start: 2023-05-04 | End: 2023-05-04 | Stop reason: SDUPTHER

## 2023-05-04 RX ORDER — ONDANSETRON 2 MG/ML
4 INJECTION INTRAMUSCULAR; INTRAVENOUS
Status: DISCONTINUED | OUTPATIENT
Start: 2023-05-04 | End: 2023-05-04 | Stop reason: HOSPADM

## 2023-05-04 RX ORDER — BUPIVACAINE HYDROCHLORIDE 5 MG/ML
INJECTION, SOLUTION EPIDURAL; INTRACAUDAL
Status: COMPLETED | OUTPATIENT
Start: 2023-05-04 | End: 2023-05-04

## 2023-05-04 RX ORDER — SODIUM CHLORIDE 0.9 % (FLUSH) 0.9 %
5-40 SYRINGE (ML) INJECTION PRN
Status: DISCONTINUED | OUTPATIENT
Start: 2023-05-04 | End: 2023-05-04 | Stop reason: HOSPADM

## 2023-05-04 RX ORDER — FENTANYL CITRATE 50 UG/ML
INJECTION, SOLUTION INTRAMUSCULAR; INTRAVENOUS PRN
Status: DISCONTINUED | OUTPATIENT
Start: 2023-05-04 | End: 2023-05-04 | Stop reason: SDUPTHER

## 2023-05-04 RX ORDER — SODIUM CHLORIDE 9 MG/ML
INJECTION, SOLUTION INTRAVENOUS PRN
Status: DISCONTINUED | OUTPATIENT
Start: 2023-05-04 | End: 2023-05-04 | Stop reason: HOSPADM

## 2023-05-04 RX ORDER — LIDOCAINE HYDROCHLORIDE 20 MG/ML
INJECTION, SOLUTION EPIDURAL; INFILTRATION; INTRACAUDAL; PERINEURAL PRN
Status: DISCONTINUED | OUTPATIENT
Start: 2023-05-04 | End: 2023-05-04 | Stop reason: SDUPTHER

## 2023-05-04 RX ORDER — SODIUM CHLORIDE 0.9 % (FLUSH) 0.9 %
5-40 SYRINGE (ML) INJECTION EVERY 12 HOURS SCHEDULED
Status: DISCONTINUED | OUTPATIENT
Start: 2023-05-04 | End: 2023-05-04 | Stop reason: HOSPADM

## 2023-05-04 RX ADMIN — LIDOCAINE HYDROCHLORIDE 60 MG: 20 INJECTION, SOLUTION EPIDURAL; INFILTRATION; INTRACAUDAL; PERINEURAL at 15:25

## 2023-05-04 RX ADMIN — SODIUM CHLORIDE: 9 INJECTION, SOLUTION INTRAVENOUS at 14:19

## 2023-05-04 RX ADMIN — ONDANSETRON 4 MG: 2 INJECTION INTRAMUSCULAR; INTRAVENOUS at 15:28

## 2023-05-04 RX ADMIN — DEXAMETHASONE SODIUM PHOSPHATE 4 MG: 4 INJECTION, SOLUTION INTRAMUSCULAR; INTRAVENOUS at 15:28

## 2023-05-04 RX ADMIN — FENTANYL CITRATE 50 MCG: 50 INJECTION INTRAMUSCULAR; INTRAVENOUS at 15:25

## 2023-05-04 RX ADMIN — PROPOFOL 150 MG: 10 INJECTION, EMULSION INTRAVENOUS at 15:25

## 2023-05-04 RX ADMIN — FENTANYL CITRATE 50 MCG: 50 INJECTION INTRAMUSCULAR; INTRAVENOUS at 15:30

## 2023-05-04 ASSESSMENT — LIFESTYLE VARIABLES: SMOKING_STATUS: 1

## 2023-05-04 ASSESSMENT — PAIN - FUNCTIONAL ASSESSMENT: PAIN_FUNCTIONAL_ASSESSMENT: 0-10

## 2023-05-04 NOTE — PROGRESS NOTES
Patient to phase II, tolerating snack provided by family. Verbalized that he would like a beer, educated on alcohol use following anesthesia; patient and sister at bedside verbalized understanding. Patient dressing at this time.

## 2023-05-04 NOTE — PROGRESS NOTES
Verbal and written discharge instructions were given to the patient and family, patient and family verbalize understanding of discharge instructions including medications orders for home and possible side effects associated with them. Patient instructed and verbalizes understanding to call the doctor listed if they should have any complications or worsening symptoms. Verbalizes understanding about follow-up appointments. D/C IV patient tolerated well, no signs of bleeding. Patient discharged home with all belongings. Ambulated off unit with sister.

## 2023-05-04 NOTE — H&P
Pre-operative Update of H&P:    I  have seen & examined Mr. Sheeba Crain related solely to his hand and upper extremity conditions, prior to the scheduled procedure on the date of his surgery. The indications for the planned surgical procedure & and his upper-extremity condition are unchanged.

## 2023-05-04 NOTE — DISCHARGE INSTRUCTIONS
Post-Operative Instructions    Ulnar Nerve Release:    Keep hand elevated with fingers above eye-level to control swelling. Keep hand and bandage clean and dry. Do not change or unwrap bandage. Please leave bandage in place until your follow-up appointment. Maintain finger motion by fully straightening and fully bending fingers and thumb at least once an hour (while awake). This may cause some discomfort, but will not damage surgery. You may use your operated hand for lightweight tasks (e.g. writing, eating, dressing, etc.). NO LIFTING, CARRYING OR HEAVY USE. Most Patients do not have \"Serious Pain\" after this procedure and thus most patients do not require prescription pain medication. You may take over the counter medication (Tylenol, Advil, Aleve, etc.) as needed. If you are unable to tolerate the discomfort after your surgery and the OTC medications do not provide some relief, you may contact our office to discuss other options. .    Please call the office at [phone]  in 24 - 48 hours to schedule a follow up appointment for approximately 7 - 10 days after surgery. Please call the office at [phone]  if you have any questions or problems. Stephanie Yeung MD

## 2023-05-04 NOTE — PROGRESS NOTES
Patient expected at 1330. Reached patient and he said he was told by isaias to be here at [phone].   He is on his way in

## 2023-05-04 NOTE — PROGRESS NOTES
Patient admitted to PACU # 9 from OR at 1545 post left ulnar nerve decompression (at elbow) per Dr Félix Ribera. Attached to PACU monitoring system and report received from anesthesia provider. Patient was reported to be hemodynamically stable during procedure.   Patient with OPA in place upon arrival.

## 2023-05-04 NOTE — ANESTHESIA POSTPROCEDURE EVALUATION
Department of Anesthesiology  Postprocedure Note    Patient: Nicola Dumas  MRN: [id number]  YOB: 1960  Date of evaluation: 5/4/2023      Procedure Summary     Date: 05/04/23 Room / Location: [address]    Anesthesia Start: 0937 Anesthesia Stop: 1203    Procedure: LEFT ULNAR NERVE DECOMPRESSION (AT ELBOW) (Left: Elbow) Diagnosis:       Cubital tunnel syndrome on left      (LEFT CUBITAL TUNNEL SYNDROME / ULNAR NERVE ENTRAPMENT AT ELBOW)    Surgeons: General Arley MD Responsible Provider: Vivian Jean MD    Anesthesia Type: general ASA Status: 2          Anesthesia Type: No value filed.     Isauro Phase I: Isauro Score: 10    Isauro Phase II:        Anesthesia Post Evaluation    Patient location during evaluation: PACU  Patient participation: complete - patient participated  Level of consciousness: awake and alert  Pain score: 2  Airway patency: patent  Nausea & Vomiting: no nausea and no vomiting  Complications: no  Cardiovascular status: blood pressure returned to baseline  Respiratory status: acceptable  Hydration status: euvolemic

## 2023-05-04 NOTE — ANESTHESIA PRE PROCEDURE
[facility] Department of Anesthesiology  Pre-Anesthesia Evaluation/Consultation       Name:  Ro Villalpando  : 1960  Age:  58 y.o. MRN:  [id number]  Date: 2023           Surgeon: Surgeon(s):  Octavio Hermosillo MD    Procedure: Procedure(s):  LEFT ULNAR NERVE DECOMPRESSION (AT ELBOW)     No Known Allergies  Patient Active Problem List   Diagnosis    Cigar smoker    Macrocytosis without anemia    Cubital tunnel syndrome on left    Family history of clotting disorder    3/28/17 Partial  RIGHT knee arthroplasty     Chronic fatigue    Erectile dysfunction     Past Medical History:   Diagnosis Date    Carpal tunnel syndrome     Cervical radiculopathy      Past Surgical History:   Procedure Laterality Date    APPENDECTOMY      CARPAL TUNNEL RELEASE Left 2017    Left  Carpal Tunnel Release & Left Ulnar Nerve decompression at the Cubital Tunnel     COLONOSCOPY      ELBOW SURGERY      left ulnar nerve entrapment    KNEE ARTHROPLASTY Right 2017    RIGHT PARTIAL KNEE ARTHROPLASTY           SHOULDER SURGERY Right      Social History     Tobacco Use    Smoking status: Some Days     Packs/day: 1.00     Years: 20.00     Pack years: 20.00     Types: Cigars, Cigarettes    Smokeless tobacco: Never    Tobacco comments:     one cigar daily    Substance Use Topics    Alcohol use: Yes     Comment: heavy, 5/day beer    Drug use: No     Medications  No current facility-administered medications on file prior to encounter.      Current Outpatient Medications on File Prior to Encounter   Medication Sig Dispense Refill    fluticasone (FLONASE) 50 MCG/ACT nasal spray 2 sprays by Each Nostril route daily (Patient taking differently: 2 sprays by Each Nostril route as needed) 48 g 1    sildenafil (REVATIO) 20 MG tablet TAKE 1 TO 3 TABLETS BY MOUTH DAILY AS NEEDED 60 tablet 5    ibuprofen (IBU) 400 MG tablet Take 1 tablet by mouth every 6 hours as needed for

## 2023-05-04 NOTE — OP NOTE
OPERATIVE REPORT          Patient:  Hyla Siemens    YOB: 1960  Date of Service:  5/4/2023    Location:  [address] [facility] [address] OR      Preoperative Diagnosis:   Left Ulnar Nerve entrapment at the Cubital Tunnel    Postoperative Diagnosis:  Same    Procedure:   Left Ulnar Nerve decompression & Cubital Tunnel Release    Surgeon:    Cailin Conner. Sandy Mcgowan MD    Surgical Assistant:    Nissa Eli PA-C    Anesthesia:  General          Blood Loss:  Minimal    Complications:  None       Tourniquet Time: 6 minutes. Indications:  Mr. Hyla Siemens  is a 58y.o. year old male with entrapment of his Left ulnar nerve at the elbow. I have discussed preoperatively with Mr. Hyla Siemens  the complications, limitations, expectations, alternatives and risks of surgical care, which he understood. Mr. Hyla Siemens  has provided written informed consent to proceed. I have explained to Mr. Hyla Siemens that despite successful treatment (surgical or otherwise) of his current presenting condition, that due to his coexistent conditions (both diagnosed and undiagnosed), that he is likely to have some permanent residual symptoms related to these conditions that do not improve long term. I have also explained that maximal recovery of function & symptom improvement may take a full year or longer to realize. He voiced a clear understanding of this. After written consent was obtained and the proper operative site identified and marked, Mr. Hyla Siemens was brought to the operating room and placed in the supine position on the operating room table. The Left arm was extended on a hand table & the Left upper extremity was prepped and draped in the usual sterile fashion. After Esmarch exsanguination the pneumo-tourniquet was inflated about the upper arm to 250 millimeters of mercury.  A curvilinear incision was fashioned over the posterior medial aspect of the elbow centered between the medial epicondyle

## 2023-05-10 ENCOUNTER — OFFICE VISIT (OUTPATIENT)
Dept: ORTHOPEDIC SURGERY | Age: 63
End: 2023-05-10

## 2023-05-10 VITALS — RESPIRATION RATE: 16 BRPM | HEIGHT: 64 IN | WEIGHT: 149 LBS | BODY MASS INDEX: 25.44 KG/M2

## 2023-05-10 DIAGNOSIS — G56.22 CUBITAL TUNNEL SYNDROME ON LEFT: Primary | ICD-10-CM

## 2023-05-10 PROCEDURE — 99024 POSTOP FOLLOW-UP VISIT: CPT | Performed by: ORTHOPAEDIC SURGERY

## 2023-05-10 NOTE — PROGRESS NOTES
Mr. Andrey Cuba returns today in follow-up of his recent left Ulnar Nerve Decompression (Cubital Tunnel Release) done approximately 1 week ago. He has done well noting mild discomfort and no other reported complications. He notes pre-operative symptoms to be partially resolved at this time. Physical Exam:  Skin incision is healing well, without erythema, drainage or sign of infection. Digital range of motion is without significant limitation. Wrist range of motion is without significant limitation. Elbow range of motion remains without significant limitation. Sensation is not changed in the Median Innervated Digits. Vascular examination reveals normal, good capillary refill, and good color. Swelling is minimal.  Sensory and Motor Ulnar Nerve function is intact. Impression:  Mr. Andrey Cuba is doing well after recent left Ulnar Nerve Decompression (Cubital Tunnel Release). Plan:  Mr. Andrey Cuba is instructed in work on Active & Passive range of motion of the digits, wrist, & elbow. These modalities were specifically demonstrated to him today. We discussed the appropriateness of gradual resumption of use of the operated hand and the return to normal use as comfort allows. He is given instructions regarding management of the fresh surgical incision and progressive use of desensitization and tissue massage techniques. We discussed the appropriate expectations and timeline for symptom improvement. He is provided a written patient instruction sheet titled: Postoperative Instructions After Ulnar Nerve Decompression. I have asked Mr. Andrey Cuba to follow-up with me or contact me by telephone over the next 2-4 weeks if his symptoms have not fully resolved or if he has not regained full & painless return of function. He is also specifically instructed to return to the office or call for an appointment sooner if his symptoms are changing or worsening prior to that time.

## 2023-05-10 NOTE — PATIENT INSTRUCTIONS
Postoperative Instructions After Ulnar Nerve Decompression    Dr. Vimal Ferreira. Siva        After bandages are removed one week from surgery, you may chose to wear a small bandage over the incision if you wish, though you do not need to. Keep incision dry until sutures have fully dissolved  or it has been 14 days since your surgery. Thereafter, you may wash with mild soap and water and shower normally. Once your stiches have fully disappeared & skin appears normal, you should begin gently massaging the incision with Vitamin E (may use Vitamin E lotion or contents of Vitamin E capsule). Work hard on motion of the fingers and wrist, straightening each finger fully and bending each finger fully, bending wrist forward and bending wrist backwards. Do not be concerned if you experience discomfort. This will not damage the surgery. You may begin using the hand as it feels comfortable beginning 12-14 days from the day of surgery. You may not feel entirely comfortable gripping or lifting heavy objects for several weeks. You may expect to see some skin peel off around the incision. You may be left with a small area of pink baby skin. This is quite normal.    Thank you for choosing [facility]) Physicians for your Hand and Upper Extremity needs. If we can be of any further assistance to you, please do not hesitate to contact us.     Office Phone Number:  [phone]  or  [phone]

## 2023-05-31 ENCOUNTER — TELEPHONE (OUTPATIENT)
Dept: ENT CLINIC | Age: 63
End: 2023-05-31

## 2023-05-31 NOTE — TELEPHONE ENCOUNTER
Pt is asking to be seen today or tomorrow. He states he can not wait until 6/13 because he is having a lot of issues with his ear.

## 2023-05-31 NOTE — TELEPHONE ENCOUNTER
Going to open up my schedule this for this Friday early afternoon at [place] for add-ons.   Can you please call the patient to schedule

## 2023-06-02 ENCOUNTER — OFFICE VISIT (OUTPATIENT)
Dept: ENT CLINIC | Age: 63
End: 2023-06-02
Payer: COMMERCIAL

## 2023-06-02 VITALS
WEIGHT: 144 LBS | BODY MASS INDEX: 23.14 KG/M2 | TEMPERATURE: 97.5 F | SYSTOLIC BLOOD PRESSURE: 138 MMHG | DIASTOLIC BLOOD PRESSURE: 75 MMHG | HEART RATE: 77 BPM | OXYGEN SATURATION: 96 % | HEIGHT: 66 IN

## 2023-06-02 DIAGNOSIS — H65.21 RIGHT CHRONIC SEROUS OTITIS MEDIA: ICD-10-CM

## 2023-06-02 DIAGNOSIS — T85.618A NON-FUNCTIONING TYMPANOSTOMY TUBE, INITIAL ENCOUNTER: Primary | ICD-10-CM

## 2023-06-02 PROCEDURE — G8420 CALC BMI NORM PARAMETERS: HCPCS | Performed by: STUDENT IN AN ORGANIZED HEALTH CARE EDUCATION/TRAINING PROGRAM

## 2023-06-02 PROCEDURE — G8427 DOCREV CUR MEDS BY ELIG CLIN: HCPCS | Performed by: STUDENT IN AN ORGANIZED HEALTH CARE EDUCATION/TRAINING PROGRAM

## 2023-06-02 PROCEDURE — 99214 OFFICE O/P EST MOD 30 MIN: CPT | Performed by: STUDENT IN AN ORGANIZED HEALTH CARE EDUCATION/TRAINING PROGRAM

## 2023-06-02 PROCEDURE — 3017F COLORECTAL CA SCREEN DOC REV: CPT | Performed by: STUDENT IN AN ORGANIZED HEALTH CARE EDUCATION/TRAINING PROGRAM

## 2023-06-02 PROCEDURE — 4004F PT TOBACCO SCREEN RCVD TLK: CPT | Performed by: STUDENT IN AN ORGANIZED HEALTH CARE EDUCATION/TRAINING PROGRAM

## 2023-06-02 RX ORDER — CIPROFLOXACIN AND DEXAMETHASONE 3; 1 MG/ML; MG/ML
4 SUSPENSION/ DROPS AURICULAR (OTIC) 2 TIMES DAILY
Qty: 1 EACH | Refills: 0 | Status: SHIPPED | OUTPATIENT
Start: 2023-06-02 | End: 2023-06-12

## 2023-06-02 NOTE — PROGRESS NOTES
[address] VISIT      Patient Name: Lonnie Posey  Medical Record Number:  [id number]  Primary Care Physician:  Mera Cedeno MD    ChiefComplaint     Chief Complaint   Patient presents with    Ear Problem     Pt c/o ear drainage and ringing since tube placement in right ear. History of Present Illness     Lonnie Posey is an 58 y.o. male previously seen for right serous otitis media status post PE tube placement on 4/3/2023. Interval History:   After initial placement of his ear tube in April his ear was feeling back to normal for approximately 1 week. Then he started noted some drainage and pulsatile tinnitus in his right ear which has persisted. Right ear feels constantly full. Feels like \"little firecrackers\" going off in right ear, will crack and pop and is mildly painful. Has been using ofloxacin ggt 2-3 times a week. Using Flonase daily.      Past Medical History     Past Medical History:   Diagnosis Date    Carpal tunnel syndrome     Cervical radiculopathy        Past Surgical History     Past Surgical History:   Procedure Laterality Date    APPENDECTOMY      ARM SURGERY Left 5/4/2023    LEFT ULNAR NERVE DECOMPRESSION (AT ELBOW) performed by Mitzi Mckeon MD at [address] Left 12/21/2017    Left  Carpal Tunnel Release & Left Ulnar Nerve decompression at the Cubital Tunnel     COLONOSCOPY      ELBOW SURGERY      left ulnar nerve entrapment    KNEE ARTHROPLASTY Right 03/28/2017    RIGHT PARTIAL KNEE ARTHROPLASTY           SHOULDER SURGERY Right        Family History     Family History   Problem Relation Age of Onset    Heart Disease Brother 64    Hypertension Father     Stroke Father     Hypertension Brother     Alcohol Abuse Brother     Other Brother         Factor V Leiden    Other Mother         dementia       Social History     Social History     Tobacco Use    Smoking status: Some Days

## 2023-06-05 ENCOUNTER — TELEPHONE (OUTPATIENT)
Dept: ENT CLINIC | Age: 63
End: 2023-06-05

## 2023-06-05 NOTE — TELEPHONE ENCOUNTER
Pt states pharmacy told him that his insurance company would not cover the cipro ear drops that were prescribed on Friday. Please complete PA.

## 2023-06-06 NOTE — TELEPHONE ENCOUNTER
Still waiting to hear back on the PA for the medication. Pt is concerned about waiting - please advise.

## 2023-06-06 NOTE — TELEPHONE ENCOUNTER
Given issues with the Ciprodex I have placed an alternative prescription and sent it electronically to his pharmacy. Cortisporin eardrops sent to his pharmacy. Use as instructed for 10 days. Please call the patient and let him know these were sent to his pharmacy.

## 2023-06-06 NOTE — TELEPHONE ENCOUNTER
Patient is calling in about the PA for the ciprodex ear drops. Patient is concerned about irreversible hearing damage with not having these ear drops. Patient was told there are other ear drops that his insurance does cover and stated our office knows which ones they are. I did explain to the patient that we are waiting on a response from the PA.

## 2023-06-27 ENCOUNTER — OFFICE VISIT (OUTPATIENT)
Dept: ENT CLINIC | Age: 63
End: 2023-06-27
Payer: COMMERCIAL

## 2023-06-27 VITALS
HEIGHT: 66 IN | WEIGHT: 146 LBS | SYSTOLIC BLOOD PRESSURE: 122 MMHG | BODY MASS INDEX: 23.46 KG/M2 | DIASTOLIC BLOOD PRESSURE: 67 MMHG | TEMPERATURE: 97.5 F | OXYGEN SATURATION: 94 % | HEART RATE: 78 BPM

## 2023-06-27 DIAGNOSIS — H65.191 ACUTE OTITIS MEDIA WITH EFFUSION OF RIGHT EAR: Primary | ICD-10-CM

## 2023-06-27 DIAGNOSIS — K04.7 DENTAL INFECTION: ICD-10-CM

## 2023-06-27 DIAGNOSIS — H90.11 CONDUCTIVE HEARING LOSS OF RIGHT EAR, UNSPECIFIED HEARING STATUS ON CONTRALATERAL SIDE: ICD-10-CM

## 2023-06-27 PROCEDURE — 4004F PT TOBACCO SCREEN RCVD TLK: CPT | Performed by: STUDENT IN AN ORGANIZED HEALTH CARE EDUCATION/TRAINING PROGRAM

## 2023-06-27 PROCEDURE — G8420 CALC BMI NORM PARAMETERS: HCPCS | Performed by: STUDENT IN AN ORGANIZED HEALTH CARE EDUCATION/TRAINING PROGRAM

## 2023-06-27 PROCEDURE — G8427 DOCREV CUR MEDS BY ELIG CLIN: HCPCS | Performed by: STUDENT IN AN ORGANIZED HEALTH CARE EDUCATION/TRAINING PROGRAM

## 2023-06-27 PROCEDURE — 99214 OFFICE O/P EST MOD 30 MIN: CPT | Performed by: STUDENT IN AN ORGANIZED HEALTH CARE EDUCATION/TRAINING PROGRAM

## 2023-06-27 PROCEDURE — 69433 CREATE EARDRUM OPENING: CPT | Performed by: STUDENT IN AN ORGANIZED HEALTH CARE EDUCATION/TRAINING PROGRAM

## 2023-06-27 PROCEDURE — 3017F COLORECTAL CA SCREEN DOC REV: CPT | Performed by: STUDENT IN AN ORGANIZED HEALTH CARE EDUCATION/TRAINING PROGRAM

## 2023-06-27 RX ORDER — CIPROFLOXACIN AND DEXAMETHASONE 3; 1 MG/ML; MG/ML
4 SUSPENSION/ DROPS AURICULAR (OTIC) 2 TIMES DAILY
Qty: 1 EACH | Refills: 0 | Status: SHIPPED | OUTPATIENT
Start: 2023-06-27 | End: 2023-07-04

## 2023-06-27 RX ORDER — AMOXICILLIN AND CLAVULANATE POTASSIUM 875; 125 MG/1; MG/1
1 TABLET, FILM COATED ORAL 2 TIMES DAILY
Qty: 14 TABLET | Refills: 0 | Status: SHIPPED | OUTPATIENT
Start: 2023-06-27 | End: 2023-07-04

## 2023-07-05 DIAGNOSIS — N52.9 ERECTILE DYSFUNCTION, UNSPECIFIED ERECTILE DYSFUNCTION TYPE: ICD-10-CM

## 2023-07-05 RX ORDER — SILDENAFIL CITRATE 20 MG/1
TABLET ORAL
Qty: 60 TABLET | Refills: 5 | Status: SHIPPED | OUTPATIENT
Start: 2023-07-05

## 2023-07-11 ENCOUNTER — TELEPHONE (OUTPATIENT)
Dept: FAMILY MEDICINE CLINIC | Age: 63
End: 2023-07-11

## 2023-07-11 NOTE — TELEPHONE ENCOUNTER
sildenafil (REVATIO) 20 MG tablet       [id number] [place], [facility], [id number], [address] [place] [address]  [id number]   Phone:  [phone]  Fax:  [phone]

## 2023-07-17 ENCOUNTER — TELEPHONE (OUTPATIENT)
Dept: FAMILY MEDICINE CLINIC | Age: 63
End: 2023-07-17

## 2023-07-17 NOTE — TELEPHONE ENCOUNTER
----- Message from Christy Flores sent at 7/17/2023  8:14 AM EDT -----  Subject: Referral Request    Reason for referral request? routine existing condition follow up please,   place any orders/testing orders for pt. upcoming appointment   Provider patient wants to be referred to(if known):     Provider Phone Number(if known): Additional Information for Provider?  please, call pt. once orders have   been placed so he can schedule he can get them done prior to appointment   pt. states he had to rescheduled due to no orders placed pt. asked that   this be brought to attention   ---------------------------------------------------------------------------  --------------  Yudy Ramah Masoud    [phone]; OK to leave message on voicemail  ---------------------------------------------------------------------------  --------------

## 2023-08-01 ENCOUNTER — HOSPITAL ENCOUNTER (OUTPATIENT)
Age: 63
Discharge: HOME | End: 2023-08-01
Payer: COMMERCIAL

## 2023-08-01 DIAGNOSIS — E78.2 MIXED HYPERLIPIDEMIA: ICD-10-CM

## 2023-08-01 LAB
ALBUMIN SERPL-MCNC: 4.2 G/DL (ref 3.4–5)
ALBUMIN/GLOB SERPL: 1.8 {RATIO} (ref 1.1–2.2)
ALP SERPL-CCNC: 57 U/L (ref 40–129)
ALT SERPL-CCNC: 22 U/L (ref 10–40)
ANION GAP SERPL CALCULATED.3IONS-SCNC: 11 MMOL/L (ref 3–16)
AST SERPL-CCNC: 24 U/L (ref 15–37)
BASOPHILS # BLD: 0 K/UL (ref 0–0.2)
BASOPHILS NFR BLD: 0.7 %
BILIRUB SERPL-MCNC: <0.2 MG/DL (ref 0–1)
BUN SERPL-MCNC: 10 MG/DL (ref 7–20)
CALCIUM SERPL-MCNC: 9.1 MG/DL (ref 8.3–10.6)
CHLORIDE SERPL-SCNC: 108 MMOL/L (ref 99–110)
CHOLEST SERPL-MCNC: 234 MG/DL (ref 0–199)
CO2 SERPL-SCNC: 23 MMOL/L (ref 21–32)
CREAT SERPL-MCNC: 1 MG/DL (ref 0.8–1.3)
DEPRECATED RDW RBC AUTO: 13.6 % (ref 12.4–15.4)
EOSINOPHIL # BLD: 0.2 K/UL (ref 0–0.6)
EOSINOPHIL NFR BLD: 4.6 %
GFR SERPLBLD CREATININE-BSD FMLA CKD-EPI: >60 ML/MIN/{1.73_M2}
GLUCOSE P FAST SERPL-MCNC: 96 MG/DL (ref 70–99)
HCT VFR BLD AUTO: 42.1 % (ref 40.5–52.5)
HDLC SERPL-MCNC: 83 MG/DL (ref 40–60)
HGB BLD-MCNC: 14.5 G/DL (ref 13.5–17.5)
LDL CHOLESTEROL CALCULATED: 135 MG/DL
LYMPHOCYTES # BLD: 1.4 K/UL (ref 1–5.1)
LYMPHOCYTES NFR BLD: 29.2 %
MCH RBC QN AUTO: 33.6 PG (ref 26–34)
MCHC RBC AUTO-ENTMCNC: 34.4 G/DL (ref 31–36)
MCV RBC AUTO: 97.5 FL (ref 80–100)
MONOCYTES # BLD: 0.6 K/UL (ref 0–1.3)
MONOCYTES NFR BLD: 13.2 %
NEUTROPHILS # BLD: 2.5 K/UL (ref 1.7–7.7)
NEUTROPHILS NFR BLD: 52.3 %
PLATELET # BLD AUTO: 254 K/UL (ref 135–450)
PMV BLD AUTO: 7.6 FL (ref 5–10.5)
POTASSIUM SERPL-SCNC: 4.4 MMOL/L (ref 3.5–5.1)
PROT SERPL-MCNC: 6.5 G/DL (ref 6.4–8.2)
RBC # BLD AUTO: 4.32 M/UL (ref 4.2–5.9)
SODIUM SERPL-SCNC: 142 MMOL/L (ref 136–145)
TRIGL SERPL-MCNC: 82 MG/DL (ref 0–150)
VLDLC SERPL CALC-MCNC: 16 MG/DL
WBC # BLD AUTO: 4.8 K/UL (ref 4–11)

## 2023-08-01 PROCEDURE — 36415 COLL VENOUS BLD VENIPUNCTURE: CPT

## 2023-08-01 PROCEDURE — 80061 LIPID PANEL: CPT

## 2023-08-01 PROCEDURE — 85025 COMPLETE CBC W/AUTO DIFF WBC: CPT

## 2023-08-01 PROCEDURE — 80053 COMPREHEN METABOLIC PANEL: CPT

## 2023-08-03 ENCOUNTER — OFFICE VISIT (OUTPATIENT)
Dept: FAMILY MEDICINE CLINIC | Age: 63
End: 2023-08-03
Payer: COMMERCIAL

## 2023-08-03 VITALS
OXYGEN SATURATION: 97 % | SYSTOLIC BLOOD PRESSURE: 120 MMHG | HEART RATE: 82 BPM | RESPIRATION RATE: 16 BRPM | DIASTOLIC BLOOD PRESSURE: 78 MMHG | WEIGHT: 147.6 LBS | TEMPERATURE: 98 F | BODY MASS INDEX: 23.82 KG/M2

## 2023-08-03 DIAGNOSIS — H66.004 RECURRENT ACUTE SUPPURATIVE OTITIS MEDIA OF RIGHT EAR WITHOUT SPONTANEOUS RUPTURE OF TYMPANIC MEMBRANE: ICD-10-CM

## 2023-08-03 DIAGNOSIS — E78.00 PURE HYPERCHOLESTEROLEMIA: ICD-10-CM

## 2023-08-03 DIAGNOSIS — N52.9 ERECTILE DYSFUNCTION, UNSPECIFIED ERECTILE DYSFUNCTION TYPE: Primary | ICD-10-CM

## 2023-08-03 DIAGNOSIS — Z12.5 SCREENING FOR MALIGNANT NEOPLASM OF PROSTATE: ICD-10-CM

## 2023-08-03 PROCEDURE — G8427 DOCREV CUR MEDS BY ELIG CLIN: HCPCS | Performed by: FAMILY MEDICINE

## 2023-08-03 PROCEDURE — G8420 CALC BMI NORM PARAMETERS: HCPCS | Performed by: FAMILY MEDICINE

## 2023-08-03 PROCEDURE — 99214 OFFICE O/P EST MOD 30 MIN: CPT | Performed by: FAMILY MEDICINE

## 2023-08-03 PROCEDURE — 3017F COLORECTAL CA SCREEN DOC REV: CPT | Performed by: FAMILY MEDICINE

## 2023-08-03 PROCEDURE — 4004F PT TOBACCO SCREEN RCVD TLK: CPT | Performed by: FAMILY MEDICINE

## 2023-08-03 RX ORDER — TADALAFIL 20 MG/1
20 TABLET ORAL DAILY PRN
Qty: 30 TABLET | Refills: 1 | Status: SHIPPED | OUTPATIENT
Start: 2023-08-03

## 2023-08-03 ASSESSMENT — ENCOUNTER SYMPTOMS
SHORTNESS OF BREATH: 0
DIARRHEA: 0
RHINORRHEA: 0
CHEST TIGHTNESS: 0
EYE ITCHING: 0
CONSTIPATION: 0

## 2023-08-03 NOTE — PROGRESS NOTES
by Gerardo Madera MD at [address] [place] [address] Left 12/21/2017    Left  Carpal Tunnel Release & Left Ulnar Nerve decompression at the Cubital Tunnel     COLONOSCOPY      ELBOW SURGERY      left ulnar nerve entrapment    KNEE ARTHROPLASTY Right 03/28/2017    RIGHT PARTIAL KNEE ARTHROPLASTY           SHOULDER SURGERY Right      Family History   Problem Relation Age of Onset    Heart Disease Brother 64    Hypertension Father     Stroke Father     Hypertension Brother     Alcohol Abuse Brother     Other Brother         Factor V Leiden    Other Mother         dementia     Social History     Tobacco Use    Smoking status: Some Days     Packs/day: 1.00     Years: 20.00     Pack years: 20.00     Types: Cigars, Cigarettes    Smokeless tobacco: Never    Tobacco comments:     one cigar daily    Substance Use Topics    Alcohol use: Yes     Comment: heavy, 5/day beer      No Known Allergies  Current Outpatient Medications on File Prior to Visit   Medication Sig Dispense Refill    sildenafil (REVATIO) 20 MG tablet TAKE 1 TO 3 TABLETS BY MOUTH DAILY AS NEEDED 60 tablet 5    fluticasone (FLONASE) 50 MCG/ACT nasal spray 2 sprays by Each Nostril route daily (Patient taking differently: 2 sprays by Each Nostril route as needed) 48 g 1    ibuprofen (IBU) 400 MG tablet Take 1 tablet by mouth every 6 hours as needed for Pain 20 tablet 0    Cholecalciferol (VITAMIN D) 50 MCG (2000 UT) CAPS capsule Take 1 capsule by mouth daily      b complex vitamins capsule Take 1 capsule by mouth daily      Garlic 8668 MG TABS Take by mouth      Multiple Vitamins-Minerals (MULTIVITAMIN ADULT PO) Take by mouth       No current facility-administered medications on file prior to visit. Review of Systems   Constitutional:  Negative for activity change and fatigue. HENT:  Positive for hearing loss and tinnitus. Negative for congestion, postnasal drip and rhinorrhea. Eyes:  Negative for itching.    Respiratory:  Negative for chest

## 2023-10-03 ENCOUNTER — TELEPHONE (OUTPATIENT)
Dept: CARDIOLOGY CLINIC | Age: 63
End: 2023-10-03

## 2023-10-03 DIAGNOSIS — E78.00 HYPERCHOLESTEROLEMIA: Primary | ICD-10-CM

## 2023-10-03 NOTE — TELEPHONE ENCOUNTER
Patient was referred by Dr. Mayra Stevens to the [facility] location with Dr. Patric Manning. Patient has been scheduled for 12/01 at 9:00am (am/pm). Patient verbalized understanding of appointment instructions. Call complete.

## 2023-10-04 ENCOUNTER — TELEPHONE (OUTPATIENT)
Dept: FAMILY MEDICINE CLINIC | Age: 63
End: 2023-10-04

## 2023-10-04 NOTE — TELEPHONE ENCOUNTER
Patient called and states that yesterday when provider called he was at work, and he didn't get a chance to really tell provider everything he had going on with his heart issues. He would like to know if provider can call him back today so he can update him further.     Please advise

## 2023-10-05 NOTE — TELEPHONE ENCOUNTER
I did call cardiology today to get him an appointment sooner. The cardiology office said they would call the patient.

## 2023-10-06 PROBLEM — E78.5 HYPERLIPEMIA: Status: ACTIVE | Noted: 2023-10-06

## 2023-10-06 PROBLEM — R93.1 ELEVATED CORONARY ARTERY CALCIUM SCORE: Status: ACTIVE | Noted: 2023-10-06

## 2023-10-06 NOTE — PROGRESS NOTES
[facility]    10/18/2023    Referring Provider: Delmy Johnston MD    HISTORY:  Daylene Mortimer is a 61 y.o. male presenting for elevated CT calcium score. He has hyperlipidemia so PCP referred him for CT cardiac calcium test.     Today he arrives alone and is ambulatory. He is a self-employed contractor. He is able to work throughout the whole day. He reports SOB and feeling fatigued at the end of the day but is never limited in what he does throughout the day. Denies chest pain/pressure/squeezing/tightness, and dizziness/lightheadedness. Pt reports family history of heart disease. Father and brothers have had heart attacks. Reports brother  from a heart attack. Does report smoking a cigar nightly. Drinks about 6 beers per day. He takes no medications daily outside of self prescribed multivitamins. He is able to walk on a treadmill. Is agreeable to start statin therapy. Has no other questions or concerns today    REVIEW OF SYSTEMS:  A complete review of systems was reviewed and is negative except as noted in the history of present illness. Prior to Visit Medications    Medication Sig Taking?  Authorizing Provider   rosuvastatin (CRESTOR) 20 MG tablet Take 1 tablet by mouth daily Yes Misael Gandara MD   tadalafil (CIALIS) 20 MG tablet Take 1 tablet by mouth daily as needed for Erectile Dysfunction Yes Delmy Johnston MD   sildenafil (REVATIO) 20 MG tablet TAKE 1 TO 3 TABLETS BY MOUTH DAILY AS NEEDED Yes Louie Pantoja MD   fluticasone Bonnie Samples) 50 MCG/ACT nasal spray 2 sprays by Each Nostril route daily  Patient taking differently: 2 sprays by Each Nostril route as needed Yes Maricarmen Welch,    ibuprofen (IBU) 400 MG tablet Take 1 tablet by mouth every 6 hours as needed for Pain Yes Darryl Arodn PA-C   Cholecalciferol (VITAMIN D) 50 MCG ( UT) CAPS capsule Take 1 capsule by mouth daily Yes Provider, MD Tiffanie   b complex vitamins capsule

## 2023-10-06 NOTE — PATIENT INSTRUCTIONS
Thank you for coming to see me today   Continue to be active     Start crestor 20mg daily for cholesterol levels  Complete stress test

## 2023-10-18 ENCOUNTER — OFFICE VISIT (OUTPATIENT)
Dept: CARDIOLOGY CLINIC | Age: 63
End: 2023-10-18
Payer: COMMERCIAL

## 2023-10-18 VITALS
BODY MASS INDEX: 23.11 KG/M2 | SYSTOLIC BLOOD PRESSURE: 128 MMHG | HEART RATE: 90 BPM | HEIGHT: 66 IN | OXYGEN SATURATION: 98 % | DIASTOLIC BLOOD PRESSURE: 70 MMHG | WEIGHT: 143.8 LBS

## 2023-10-18 DIAGNOSIS — E78.5 HYPERLIPIDEMIA, UNSPECIFIED HYPERLIPIDEMIA TYPE: ICD-10-CM

## 2023-10-18 DIAGNOSIS — R53.83 FATIGUE, UNSPECIFIED TYPE: ICD-10-CM

## 2023-10-18 DIAGNOSIS — R93.1 ELEVATED CORONARY ARTERY CALCIUM SCORE: ICD-10-CM

## 2023-10-18 DIAGNOSIS — Z72.0 TOBACCO ABUSE: ICD-10-CM

## 2023-10-18 DIAGNOSIS — R06.09 DOE (DYSPNEA ON EXERTION): Primary | ICD-10-CM

## 2023-10-18 PROCEDURE — 93000 ELECTROCARDIOGRAM COMPLETE: CPT | Performed by: STUDENT IN AN ORGANIZED HEALTH CARE EDUCATION/TRAINING PROGRAM

## 2023-10-18 PROCEDURE — 99204 OFFICE O/P NEW MOD 45 MIN: CPT | Performed by: STUDENT IN AN ORGANIZED HEALTH CARE EDUCATION/TRAINING PROGRAM

## 2023-10-18 RX ORDER — ROSUVASTATIN CALCIUM 20 MG/1
20 TABLET, COATED ORAL DAILY
Qty: 90 TABLET | Refills: 3 | Status: SHIPPED | OUTPATIENT
Start: 2023-10-18

## 2023-10-27 ENCOUNTER — HOSPITAL ENCOUNTER (OUTPATIENT)
Dept: NON INVASIVE DIAGNOSTICS | Age: 63
Discharge: HOME | End: 2023-10-27
Attending: STUDENT IN AN ORGANIZED HEALTH CARE EDUCATION/TRAINING PROGRAM
Payer: COMMERCIAL

## 2023-10-27 DIAGNOSIS — R06.09 DOE (DYSPNEA ON EXERTION): ICD-10-CM

## 2023-10-27 DIAGNOSIS — R53.83 FATIGUE, UNSPECIFIED TYPE: ICD-10-CM

## 2023-10-27 PROCEDURE — A9502 TC99M TETROFOSMIN: HCPCS | Performed by: STUDENT IN AN ORGANIZED HEALTH CARE EDUCATION/TRAINING PROGRAM

## 2023-10-27 PROCEDURE — 93017 CV STRESS TEST TRACING ONLY: CPT | Performed by: INTERNAL MEDICINE

## 2023-10-27 PROCEDURE — 3430000000 HC RX DIAGNOSTIC RADIOPHARMACEUTICAL: Performed by: STUDENT IN AN ORGANIZED HEALTH CARE EDUCATION/TRAINING PROGRAM

## 2023-10-27 PROCEDURE — 78452 HT MUSCLE IMAGE SPECT MULT: CPT

## 2023-10-27 RX ADMIN — TETROFOSMIN 30 MILLICURIE: 1.38 INJECTION, POWDER, LYOPHILIZED, FOR SOLUTION INTRAVENOUS at 08:57

## 2023-10-27 RX ADMIN — TETROFOSMIN 10 MILLICURIE: 1.38 INJECTION, POWDER, LYOPHILIZED, FOR SOLUTION INTRAVENOUS at 08:02

## 2023-10-27 NOTE — PROGRESS NOTES
Patient instructed on Santy Protocol Stress Test Procedure including possible side effects and adverse reactions. Verbalizes knowledge and understanding and denies having any questions.

## 2023-11-07 ENCOUNTER — OFFICE VISIT (OUTPATIENT)
Dept: ENT CLINIC | Age: 63
End: 2023-11-07
Payer: COMMERCIAL

## 2023-11-07 ENCOUNTER — TELEPHONE (OUTPATIENT)
Dept: ADMINISTRATIVE | Age: 63
End: 2023-11-07

## 2023-11-07 VITALS
HEART RATE: 93 BPM | BODY MASS INDEX: 22.98 KG/M2 | SYSTOLIC BLOOD PRESSURE: 145 MMHG | DIASTOLIC BLOOD PRESSURE: 78 MMHG | HEIGHT: 66 IN | WEIGHT: 143 LBS | OXYGEN SATURATION: 96 % | TEMPERATURE: 97.8 F

## 2023-11-07 DIAGNOSIS — Z96.22 HISTORY OF TYMPANOSTOMY TUBE PLACEMENT: ICD-10-CM

## 2023-11-07 DIAGNOSIS — H66.3X1 CHRONIC SUPPURATIVE OTITIS MEDIA OF RIGHT EAR, UNSPECIFIED OTITIS MEDIA LOCATION: Primary | ICD-10-CM

## 2023-11-07 PROCEDURE — G8420 CALC BMI NORM PARAMETERS: HCPCS | Performed by: STUDENT IN AN ORGANIZED HEALTH CARE EDUCATION/TRAINING PROGRAM

## 2023-11-07 PROCEDURE — 4004F PT TOBACCO SCREEN RCVD TLK: CPT | Performed by: STUDENT IN AN ORGANIZED HEALTH CARE EDUCATION/TRAINING PROGRAM

## 2023-11-07 PROCEDURE — G8484 FLU IMMUNIZE NO ADMIN: HCPCS | Performed by: STUDENT IN AN ORGANIZED HEALTH CARE EDUCATION/TRAINING PROGRAM

## 2023-11-07 PROCEDURE — G8427 DOCREV CUR MEDS BY ELIG CLIN: HCPCS | Performed by: STUDENT IN AN ORGANIZED HEALTH CARE EDUCATION/TRAINING PROGRAM

## 2023-11-07 PROCEDURE — 3017F COLORECTAL CA SCREEN DOC REV: CPT | Performed by: STUDENT IN AN ORGANIZED HEALTH CARE EDUCATION/TRAINING PROGRAM

## 2023-11-07 PROCEDURE — 99214 OFFICE O/P EST MOD 30 MIN: CPT | Performed by: STUDENT IN AN ORGANIZED HEALTH CARE EDUCATION/TRAINING PROGRAM

## 2023-11-07 RX ORDER — CIPROFLOXACIN AND DEXAMETHASONE 3; 1 MG/ML; MG/ML
4 SUSPENSION/ DROPS AURICULAR (OTIC) 2 TIMES DAILY
Qty: 1 EACH | Refills: 0 | Status: SHIPPED | OUTPATIENT
Start: 2023-11-07 | End: 2023-11-17

## 2023-11-07 NOTE — PROGRESS NOTES
[place] [address] [address]ISIT      Patient Name: Stacy Sorensen  Medical Record Number:  [id number]  Primary Care Physician:  Charo Rodriguez MD    ChiefComplaint     Chief Complaint   Patient presents with    Ear Problem     Ringing in ear, Rt ear clogged, can hardly hear out of it        History of Present Illness     Stacy Sorensen is an 61 y.o. male previously seen for right serous otitis media status post PE tube placement on 4/3/2023. Noted to have nonfunctioning right tympanostomy tube which was removed on 6/2/2023. Myringotomy with placement of right-sided PE tube performed again on 6/27/2023. Interval History:   Has been getting a lot of light brown/gold drainage from the right ear. Hearing not better after tube was placed. Hears constant ringing in right ear with occasional pulsation in right ear. No recent ear drops.     Past Medical History     Past Medical History:   Diagnosis Date    Carpal tunnel syndrome     Cervical radiculopathy        Past Surgical History     Past Surgical History:   Procedure Laterality Date    APPENDECTOMY      ARM SURGERY Left 5/4/2023    LEFT ULNAR NERVE DECOMPRESSION (AT ELBOW) performed by Gerardo Madera MD at [address] [place] [address] Left 12/21/2017    Left  Carpal Tunnel Release & Left Ulnar Nerve decompression at the Cubital Tunnel     COLONOSCOPY      ELBOW SURGERY      left ulnar nerve entrapment    KNEE ARTHROPLASTY Right 03/28/2017    RIGHT PARTIAL KNEE ARTHROPLASTY           SHOULDER SURGERY Right        Family History     Family History   Problem Relation Age of Onset    Heart Disease Brother 64    Hypertension Father     Stroke Father     Hypertension Brother     Alcohol Abuse Brother     Other Brother         Factor V Leiden    Other Mother         dementia       Social History     Social History     Tobacco Use    Smoking status: Some Days     Packs/day: 1.00     Years: 20.00

## 2023-11-07 NOTE — TELEPHONE ENCOUNTER
Submitted PA for Ciprofloxacin-dexAMETHasone 0.3-0.1% suspension  Via CM Key: [id number] STATUS: PENDING OV NOTE    Follow up done daily; if no response in three days we will refax for status check. If another three days goes by with no response we will call the insurance for status.

## 2023-11-09 ENCOUNTER — TELEPHONE (OUTPATIENT)
Dept: ENT CLINIC | Age: 63
End: 2023-11-09

## 2023-11-09 NOTE — TELEPHONE ENCOUNTER
Medication is pending, Insurance needs OV note from 11/7/23 OV, once signed will send to Insurance, please advise

## 2023-11-09 NOTE — TELEPHONE ENCOUNTER
Pt.called stated he was told to call the office and talk to Dr. Desean Hightower if the medication (ear drops) won't go through with the insurance company.

## 2023-11-10 NOTE — TELEPHONE ENCOUNTER
It looks like prior Auth has been submitted. I just finished my last office note, please submit with a prior Auth. Please call the patient that everything has been submitted and we need to wait to hear back from the insurance company. If we do not hear anything back early next week we can consider another type of eardrop.

## 2023-11-13 NOTE — TELEPHONE ENCOUNTER
Spoke to pt and advised him PA was submitted, advised pt we would call once we hear back from insurance

## 2023-11-13 NOTE — TELEPHONE ENCOUNTER
The medication is APPROVED. Letter in media. If this requires a response please respond to the pool ( P MHCX [id number]). Thank you please advise patient.

## 2023-11-13 NOTE — TELEPHONE ENCOUNTER
Submitted PA for Ciprofloxacin-dexAMETHasone 0.3-0.1% suspension  Via [facility] Key: [id number] STATUS: PENDING. Follow up done daily; if no response in three days we will refax for status check. If another three days goes by with no response we will call the insurance for status.

## 2023-11-28 ENCOUNTER — OFFICE VISIT (OUTPATIENT)
Dept: ENT CLINIC | Age: 63
End: 2023-11-28
Payer: COMMERCIAL

## 2023-11-28 VITALS
SYSTOLIC BLOOD PRESSURE: 141 MMHG | OXYGEN SATURATION: 95 % | WEIGHT: 148 LBS | HEIGHT: 66 IN | BODY MASS INDEX: 23.78 KG/M2 | TEMPERATURE: 97.8 F | HEART RATE: 91 BPM | DIASTOLIC BLOOD PRESSURE: 78 MMHG

## 2023-11-28 DIAGNOSIS — H66.3X1 CHRONIC SUPPURATIVE OTITIS MEDIA OF RIGHT EAR, UNSPECIFIED OTITIS MEDIA LOCATION: Primary | ICD-10-CM

## 2023-11-28 DIAGNOSIS — Z96.22 HISTORY OF TYMPANOSTOMY TUBE PLACEMENT: ICD-10-CM

## 2023-11-28 PROCEDURE — G8420 CALC BMI NORM PARAMETERS: HCPCS | Performed by: STUDENT IN AN ORGANIZED HEALTH CARE EDUCATION/TRAINING PROGRAM

## 2023-11-28 PROCEDURE — 99213 OFFICE O/P EST LOW 20 MIN: CPT | Performed by: STUDENT IN AN ORGANIZED HEALTH CARE EDUCATION/TRAINING PROGRAM

## 2023-11-28 PROCEDURE — G8427 DOCREV CUR MEDS BY ELIG CLIN: HCPCS | Performed by: STUDENT IN AN ORGANIZED HEALTH CARE EDUCATION/TRAINING PROGRAM

## 2023-11-28 PROCEDURE — G8484 FLU IMMUNIZE NO ADMIN: HCPCS | Performed by: STUDENT IN AN ORGANIZED HEALTH CARE EDUCATION/TRAINING PROGRAM

## 2023-11-28 PROCEDURE — 4004F PT TOBACCO SCREEN RCVD TLK: CPT | Performed by: STUDENT IN AN ORGANIZED HEALTH CARE EDUCATION/TRAINING PROGRAM

## 2023-11-28 PROCEDURE — 3017F COLORECTAL CA SCREEN DOC REV: CPT | Performed by: STUDENT IN AN ORGANIZED HEALTH CARE EDUCATION/TRAINING PROGRAM

## 2023-11-28 NOTE — PROGRESS NOTES
[place] [address] [address]ISIT      Patient Name: Mansi [address] Record Number:  [id number]  Primary Care Physician:  Jairo Perez MD    ChiefComplaint     Chief Complaint   Patient presents with    Follow-up     Recheck ear tubes, symptoms have improved       History of Present Illness     Ellyn Cooks is an 61 y.o. male previously seen for persistent otorrhea after right PE tube placement. Last seen 11/10/2023, started on Ciprodex at that time. Interval History:   He had a delay in getting his Ciprodex eardrops. He started them approximately 10 days ago, has a few days left. Since starting on the eardrops he no longer has drainage from his right ear. Prior to using the eardrops he was having drainage from his right ear that would soak his pillow. No ear pain. Denies interval hearing loss. .    Past Medical History     Past Medical History:   Diagnosis Date    Carpal tunnel syndrome     Cervical radiculopathy        Past Surgical History     Past Surgical History:   Procedure Laterality Date    APPENDECTOMY      ARM SURGERY Left 5/4/2023    LEFT ULNAR NERVE DECOMPRESSION (AT ELBOW) performed by Cheryl Miguel MD at [address] [place] [address] Left 12/21/2017    Left  Carpal Tunnel Release & Left Ulnar Nerve decompression at the Cubital Tunnel     COLONOSCOPY      ELBOW SURGERY      left ulnar nerve entrapment    KNEE ARTHROPLASTY Right 03/28/2017    RIGHT PARTIAL KNEE ARTHROPLASTY           SHOULDER SURGERY Right        Family History     Family History   Problem Relation Age of Onset    Heart Disease Brother 64    Hypertension Father     Stroke Father     Hypertension Brother     Alcohol Abuse Brother     Other Brother         Factor V Leiden    Other Mother         dementia       Social History     Social History     Tobacco Use    Smoking status: Some Days     Packs/day: 1.00     Years: 20.00     Additional pack years:

## 2024-04-05 ENCOUNTER — OFFICE VISIT (OUTPATIENT)
Dept: FAMILY MEDICINE CLINIC | Age: 64
End: 2024-04-05
Payer: COMMERCIAL

## 2024-04-05 VITALS
TEMPERATURE: 97.4 F | OXYGEN SATURATION: 96 % | HEART RATE: 111 BPM | WEIGHT: 157 LBS | DIASTOLIC BLOOD PRESSURE: 82 MMHG | BODY MASS INDEX: 25.35 KG/M2 | SYSTOLIC BLOOD PRESSURE: 138 MMHG

## 2024-04-05 DIAGNOSIS — R93.1 ELEVATED CORONARY ARTERY CALCIUM SCORE: ICD-10-CM

## 2024-04-05 DIAGNOSIS — E78.5 HYPERLIPIDEMIA, UNSPECIFIED HYPERLIPIDEMIA TYPE: ICD-10-CM

## 2024-04-05 DIAGNOSIS — N52.9 ERECTILE DYSFUNCTION, UNSPECIFIED ERECTILE DYSFUNCTION TYPE: Primary | ICD-10-CM

## 2024-04-05 PROCEDURE — 4004F PT TOBACCO SCREEN RCVD TLK: CPT | Performed by: FAMILY MEDICINE

## 2024-04-05 PROCEDURE — 99214 OFFICE O/P EST MOD 30 MIN: CPT | Performed by: FAMILY MEDICINE

## 2024-04-05 PROCEDURE — G8419 CALC BMI OUT NRM PARAM NOF/U: HCPCS | Performed by: FAMILY MEDICINE

## 2024-04-05 PROCEDURE — G8427 DOCREV CUR MEDS BY ELIG CLIN: HCPCS | Performed by: FAMILY MEDICINE

## 2024-04-05 PROCEDURE — 3017F COLORECTAL CA SCREEN DOC REV: CPT | Performed by: FAMILY MEDICINE

## 2024-04-05 RX ORDER — ROSUVASTATIN CALCIUM 20 MG/1
20 TABLET, COATED ORAL DAILY
Qty: 90 TABLET | Refills: 3 | Status: SHIPPED | OUTPATIENT
Start: 2024-04-05

## 2024-04-05 RX ORDER — TADALAFIL 20 MG/1
20 TABLET ORAL DAILY PRN
Qty: 30 TABLET | Refills: 1 | Status: SHIPPED | OUTPATIENT
Start: 2024-04-05

## 2024-04-05 ASSESSMENT — PATIENT HEALTH QUESTIONNAIRE - PHQ9
SUM OF ALL RESPONSES TO PHQ QUESTIONS 1-9: 0
2. FEELING DOWN, DEPRESSED OR HOPELESS: NOT AT ALL
SUM OF ALL RESPONSES TO PHQ9 QUESTIONS 1 & 2: 0

## 2024-04-05 NOTE — PROGRESS NOTES
SUBJECTIVE:    Taco Kerr is a 63 y.o. male who presents for a follow up visit.    Chief Complaint   Patient presents with    Follow-up     High cholesterol         Hyperlipidemia  This is a chronic problem. The current episode started more than 1 year ago. Lipid results: Patient did not get lab that was needed prior. Associated symptoms include chest pain. Pertinent negatives include no shortness of breath. Current antihyperlipidemic treatment includes statins. Improvement on treatment: Patient will be getting his labs drawn today. Compliance problems include adherence to exercise and adherence to diet.  Risk factors for coronary artery disease include dyslipidemia, male sex and a sedentary lifestyle.   Erectile Dysfunction  This is a chronic problem. The current episode started more than 1 year ago. The nature of his difficulty is maintaining erection and achieving erection. Duration of erection: Getting wore last 6 months. Irritative symptoms do not include frequency, nocturia or urgency. Obstructive symptoms do not include a weak stream. Past treatments include sildenafil. The treatment provided moderate relief. He has been using treatment for 1 to 6 months. He has had no adverse reactions caused by medications.        Patient's medications, allergies, past medical,surgical, social and family histories were reviewed and updated as appropriate.     Past Medical History:   Diagnosis Date    Carpal tunnel syndrome     Cervical radiculopathy     Erectile dysfunction 03/25/2022    Hyperlipemia 10/06/2023     Past Surgical History:   Procedure Laterality Date    APPENDECTOMY      ARM SURGERY Left 5/4/2023    LEFT ULNAR NERVE DECOMPRESSION (AT ELBOW) performed by Enzo Paniagua MD at [facility] OR    CARPAL TUNNEL RELEASE Left 12/21/2017    Left  Carpal Tunnel Release & Left Ulnar Nerve decompression at the Cubital Tunnel     COLONOSCOPY      ELBOW SURGERY      left ulnar nerve entrapment    KNEE ARTHROPLASTY Right

## 2024-04-16 ENCOUNTER — HOSPITAL ENCOUNTER (OUTPATIENT)
Age: 64
Discharge: HOME | End: 2024-04-16
Payer: COMMERCIAL

## 2024-04-16 DIAGNOSIS — E78.00 PURE HYPERCHOLESTEROLEMIA: ICD-10-CM

## 2024-04-16 DIAGNOSIS — Z12.5 SCREENING FOR MALIGNANT NEOPLASM OF PROSTATE: ICD-10-CM

## 2024-04-16 LAB
ALBUMIN SERPL-MCNC: 4.6 G/DL (ref 3.4–5)
ALBUMIN/GLOB SERPL: 1.8 {RATIO} (ref 1.1–2.2)
ALP SERPL-CCNC: 58 U/L (ref 40–129)
ALT SERPL-CCNC: 34 U/L (ref 10–40)
ANION GAP SERPL CALCULATED.3IONS-SCNC: 11 MMOL/L (ref 3–16)
AST SERPL-CCNC: 39 U/L (ref 15–37)
BASOPHILS # BLD: 0 K/UL (ref 0–0.2)
BASOPHILS NFR BLD: 0.7 %
BILIRUB SERPL-MCNC: 0.5 MG/DL (ref 0–1)
BUN SERPL-MCNC: 8 MG/DL (ref 7–20)
CALCIUM SERPL-MCNC: 9.6 MG/DL (ref 8.3–10.6)
CHLORIDE SERPL-SCNC: 104 MMOL/L (ref 99–110)
CHOLEST SERPL-MCNC: 198 MG/DL (ref 0–199)
CO2 SERPL-SCNC: 28 MMOL/L (ref 21–32)
CREAT SERPL-MCNC: 0.9 MG/DL (ref 0.8–1.3)
DEPRECATED RDW RBC AUTO: 13.7 % (ref 12.4–15.4)
EOSINOPHIL # BLD: 0.1 K/UL (ref 0–0.6)
EOSINOPHIL NFR BLD: 2.2 %
GFR SERPLBLD CREATININE-BSD FMLA CKD-EPI: >90 ML/MIN/{1.73_M2}
GLUCOSE SERPL-MCNC: 106 MG/DL (ref 70–99)
HCT VFR BLD AUTO: 43.7 % (ref 40.5–52.5)
HDLC SERPL-MCNC: 121 MG/DL (ref 40–60)
HGB BLD-MCNC: 15 G/DL (ref 13.5–17.5)
LDLC SERPL CALC-MCNC: 66 MG/DL
LYMPHOCYTES # BLD: 1 K/UL (ref 1–5.1)
LYMPHOCYTES NFR BLD: 19.1 %
MCH RBC QN AUTO: 33.6 PG (ref 26–34)
MCHC RBC AUTO-ENTMCNC: 34.2 G/DL (ref 31–36)
MCV RBC AUTO: 98.2 FL (ref 80–100)
MONOCYTES # BLD: 0.7 K/UL (ref 0–1.3)
MONOCYTES NFR BLD: 12.8 %
NEUTROPHILS # BLD: 3.4 K/UL (ref 1.7–7.7)
NEUTROPHILS NFR BLD: 65.2 %
PLATELET # BLD AUTO: 253 K/UL (ref 135–450)
PMV BLD AUTO: 7.5 FL (ref 5–10.5)
POTASSIUM SERPL-SCNC: 4.5 MMOL/L (ref 3.5–5.1)
PROT SERPL-MCNC: 7.1 G/DL (ref 6.4–8.2)
PSA SERPL DL<=0.01 NG/ML-MCNC: 1.45 NG/ML (ref 0–4)
RBC # BLD AUTO: 4.45 M/UL (ref 4.2–5.9)
SODIUM SERPL-SCNC: 143 MMOL/L (ref 136–145)
TRIGL SERPL-MCNC: 54 MG/DL (ref 0–150)
TSH SERPL DL<=0.005 MIU/L-ACNC: 1.82 UIU/ML (ref 0.27–4.2)
VLDLC SERPL CALC-MCNC: 11 MG/DL
WBC # BLD AUTO: 5.3 K/UL (ref 4–11)

## 2024-04-16 PROCEDURE — 84443 ASSAY THYROID STIM HORMONE: CPT

## 2024-04-16 PROCEDURE — 36415 COLL VENOUS BLD VENIPUNCTURE: CPT

## 2024-04-16 PROCEDURE — 80053 COMPREHEN METABOLIC PANEL: CPT

## 2024-04-16 PROCEDURE — 85025 COMPLETE CBC W/AUTO DIFF WBC: CPT

## 2024-04-16 PROCEDURE — 80061 LIPID PANEL: CPT

## 2024-04-16 PROCEDURE — 84403 ASSAY OF TOTAL TESTOSTERONE: CPT

## 2024-04-16 PROCEDURE — 84153 ASSAY OF PSA TOTAL: CPT

## 2024-04-18 LAB — TESTOST SERPL-MCNC: 922 NG/DL (ref 193–740)

## 2024-10-05 NOTE — PROGRESS NOTES
weakness and headaches.   Psychiatric/Behavioral:  Negative for dysphoric mood and sleep disturbance. The patient is not nervous/anxious.        OBJECTIVE:    /70   Pulse 95   Temp 98 °F (36.7 °C) (Temporal)   Wt 65 kg (143 lb 3.2 oz)   SpO2 95%   BMI 23.12 kg/m²    Physical Exam  Constitutional:       General: He is not in acute distress.     Appearance: Normal appearance. He is well-developed.   HENT:      Head: Normocephalic and atraumatic.      Right Ear: Tympanic membrane and external ear normal.      Left Ear: External ear normal.      Nose: Nose normal.      Mouth/Throat:      Mouth: Mucous membranes are moist.      Pharynx: No posterior oropharyngeal erythema.   Eyes:      General:         Right eye: No discharge.      Conjunctiva/sclera: Conjunctivae normal.   Neck:      Thyroid: No thyromegaly.      Vascular: No JVD.      Trachea: No tracheal deviation.   Cardiovascular:      Rate and Rhythm: Normal rate and regular rhythm.      Heart sounds: Normal heart sounds.   Pulmonary:      Effort: Pulmonary effort is normal. No respiratory distress.      Breath sounds: Normal breath sounds. No rales.   Musculoskeletal:      Cervical back: Normal range of motion and neck supple.      Right lower leg: No edema.      Left lower leg: No edema.   Lymphadenopathy:      Cervical: No cervical adenopathy.   Skin:     General: Skin is warm and dry.   Neurological:      Mental Status: He is alert and oriented to person, place, and time.   Psychiatric:         Mood and Affect: Mood normal.         Behavior: Behavior normal.         ASSESSMENT/PLAN:    Taco was seen today for follow-up.    Diagnoses and all orders for this visit:    Pure hypercholesterolemia  -     Comprehensive Metabolic Panel, Fasting; Future  -     Lipid, Fasting; Future  -     CBC with Auto Differential; Future    Erectile dysfunction, unspecified erectile dysfunction type  -     tadalafil (CIALIS) 20 MG tablet; Take 1 tablet by mouth daily as

## 2024-10-08 ENCOUNTER — OFFICE VISIT (OUTPATIENT)
Dept: FAMILY MEDICINE CLINIC | Age: 64
End: 2024-10-08

## 2024-10-08 VITALS
SYSTOLIC BLOOD PRESSURE: 132 MMHG | BODY MASS INDEX: 23.12 KG/M2 | HEART RATE: 95 BPM | DIASTOLIC BLOOD PRESSURE: 70 MMHG | OXYGEN SATURATION: 95 % | WEIGHT: 143.2 LBS | TEMPERATURE: 98 F

## 2024-10-08 DIAGNOSIS — E78.00 PURE HYPERCHOLESTEROLEMIA: Primary | ICD-10-CM

## 2024-10-08 DIAGNOSIS — R53.82 CHRONIC FATIGUE: ICD-10-CM

## 2024-10-08 DIAGNOSIS — L98.9 SKIN LESION: ICD-10-CM

## 2024-10-08 DIAGNOSIS — N52.9 ERECTILE DYSFUNCTION, UNSPECIFIED ERECTILE DYSFUNCTION TYPE: ICD-10-CM

## 2024-10-08 DIAGNOSIS — F17.290 CIGAR SMOKER: ICD-10-CM

## 2024-10-08 PROCEDURE — 99214 OFFICE O/P EST MOD 30 MIN: CPT | Performed by: FAMILY MEDICINE

## 2024-10-08 RX ORDER — TADALAFIL 20 MG/1
20 TABLET ORAL DAILY PRN
Qty: 30 TABLET | Refills: 1 | Status: SHIPPED | OUTPATIENT
Start: 2024-10-08

## 2024-10-08 SDOH — ECONOMIC STABILITY: FOOD INSECURITY: WITHIN THE PAST 12 MONTHS, YOU WORRIED THAT YOUR FOOD WOULD RUN OUT BEFORE YOU GOT MONEY TO BUY MORE.: NEVER TRUE

## 2024-10-08 SDOH — ECONOMIC STABILITY: FOOD INSECURITY: WITHIN THE PAST 12 MONTHS, THE FOOD YOU BOUGHT JUST DIDN'T LAST AND YOU DIDN'T HAVE MONEY TO GET MORE.: NEVER TRUE

## 2024-10-08 SDOH — ECONOMIC STABILITY: INCOME INSECURITY: HOW HARD IS IT FOR YOU TO PAY FOR THE VERY BASICS LIKE FOOD, HOUSING, MEDICAL CARE, AND HEATING?: NOT HARD AT ALL

## 2024-10-08 ASSESSMENT — ENCOUNTER SYMPTOMS
CHANGE IN BOWEL HABIT: 0
SORE THROAT: 0
ABDOMINAL PAIN: 0
NAUSEA: 0

## 2024-10-08 NOTE — PATIENT INSTRUCTIONS
--Dermatologists:      [facility]    [phone]   MD Radha Verdugo MD Emily Fisher, MD Rachel Gustin, MD Dena Elkeeb, MD      [facility]:  Dr. Frankie Palacios  [address] [place] [address]  Phone: [phone]    [place]:  Dr. Shelton Dugan  [address]  [phone]    [place]:  Dr. WENDY Kendrick  [address]  [address]  [place] [address]  Phone: [phone]    [place]  [address] [place] [address]  Phone: [phone]    [place]:  Dr. Bill Du  [address]  [address]  [place] [address]  Phone: [phone]    [facility]:  [address] [place] [address]  Phone: [phone]        [facility]  [address] [place], [place] [address]  Phone: [phone]       [facility]  Dr. Bharati Gray  [phone]  [address] [place], [place]       [facility]  Dr. Dorothy Guevara MD  [phone] [address]  [place] [address]      Dermatology and Surgery  Kwadwo Nichols MD  [phone] [address], [facility]  [address], [place]      [facility]  Susi Saab MD  [address] [place]  [address] [phone]      [facility]  [facility] [address], [address]  [place] [address] [phone]      [place] [facility]  Dr. Kiki Kendrick  [address] [place], [place]  [phone]

## 2024-10-25 NOTE — PROGRESS NOTES
Davy Arguello  : 1960  Encounter date: 2023    This is a 58 y.o. male who presents with  Chief Complaint   Patient presents with    Otalgia     Swollen, painful started 10 days ago but worsening. History of present illness:    HPI   Presents to clinic today with concerns for right ear pain/swelling that started approximately 10 days prior. Per patient this issues has happened previously - typically its once per year will have to get his ear irrigated. Uses hydrogen peroxide at home for maintenance. Denies any fever/chills/muscle aches. No Known Allergies  Current Outpatient Medications   Medication Sig Dispense Refill    ofloxacin (OCUFLOX) 0.3 % solution Place 1 drop in ear(s) 4 times daily for 10 days 1 each 0    sildenafil (REVATIO) 20 MG tablet TAKE 1 TO 3 TABLETS BY MOUTH DAILY AS NEEDED 60 tablet 5    ibuprofen (IBU) 400 MG tablet Take 1 tablet by mouth every 6 hours as needed for Pain 20 tablet 0    Cholecalciferol (VITAMIN D) 50 MCG (2000 UT) CAPS capsule Take 1 capsule by mouth daily      b complex vitamins capsule Take 1 capsule by mouth daily      Garlic 5068 MG TABS Take by mouth      Multiple Vitamins-Minerals (MULTIVITAMIN ADULT PO) Take by mouth       No current facility-administered medications for this visit. Review of Systems   Constitutional:  Negative for activity change, appetite change, chills, fatigue and fever. Respiratory:  Negative for cough and shortness of breath. Cardiovascular:  Negative for chest pain and palpitations. Gastrointestinal:  Negative for diarrhea, nausea and vomiting. Past medical, surgical, family and social history were reviewed and updated with the patient.     Objective:    /80 (Site: Left Upper Arm, Position: Sitting, Cuff Size: Medium Adult)   Pulse 98   Temp 98.9 °F (37.2 °C)   Wt 148 lb 9.6 oz (67.4 kg)   SpO2 98%   BMI 24.73 kg/m²   Weight: 148 lb 9.6 oz (67.4 kg)     BP Readings from Last 3 Encounters: 01/27/23 130/80   12/14/22 122/80   11/04/22 124/80     Wt Readings from Last 3 Encounters:   01/27/23 148 lb 9.6 oz (67.4 kg)   12/14/22 151 lb (68.5 kg)   12/14/22 151 lb 0.2 oz (68.5 kg)     Physical Exam  Constitutional:       General: He is not in acute distress. Appearance: He is well-developed. HENT:      Head: Normocephalic and atraumatic. Right Ear: Drainage (scant wet, prurlent drainage), swelling and tenderness present. Left Ear: Hearing, tympanic membrane, ear canal and external ear normal.   Cardiovascular:      Rate and Rhythm: Normal rate and regular rhythm. Heart sounds: Normal heart sounds, S1 normal and S2 normal.   Pulmonary:      Effort: Pulmonary effort is normal. No respiratory distress. Breath sounds: Normal breath sounds. Skin:     General: Skin is warm and dry. Neurological:      Mental Status: He is alert and oriented to person, place, and time. Psychiatric:         Thought Content: Thought content normal.         Judgment: Judgment normal.     Assessment/Plan    1. Acute swimmer's ear of right side  Trial ofloxacin. Monitor for worsening or persistent symptoms. Call office if concerned. - ofloxacin (OCUFLOX) 0.3 % solution; Place 1 drop in ear(s) 4 times daily for 10 days  Dispense: 1 each; Refill: 0     Duwaine Apley was counseled regarding symptoms of current diagnosis, course and complications of disease if inadequately treated. Discussed side effects of medications, diagnosis, treatment options, and prognosis along with risks, benefits, complications, and alternatives of treatment including labs, imaging and other studies/treatment targets and goals. He verbalized understanding of instructions and counseling. Return if symptoms worsen or fail to improve. Medical decision making of low complexity. patient

## 2025-04-03 ENCOUNTER — HOSPITAL ENCOUNTER (OUTPATIENT)
Age: 65
Discharge: HOME | End: 2025-04-03
Payer: COMMERCIAL

## 2025-04-03 DIAGNOSIS — E78.00 PURE HYPERCHOLESTEROLEMIA: ICD-10-CM

## 2025-04-03 LAB
ALBUMIN SERPL-MCNC: 4.2 G/DL (ref 3.4–5)
ALBUMIN/GLOB SERPL: 1.8 {RATIO} (ref 1.1–2.2)
ALP SERPL-CCNC: 57 U/L (ref 40–129)
ALT SERPL-CCNC: 34 U/L (ref 10–40)
ANION GAP SERPL CALCULATED.3IONS-SCNC: 11 MMOL/L (ref 3–16)
AST SERPL-CCNC: 26 U/L (ref 15–37)
BASOPHILS # BLD: 0.1 K/UL (ref 0–0.2)
BASOPHILS NFR BLD: 1 %
BILIRUB SERPL-MCNC: 0.4 MG/DL (ref 0–1)
BUN SERPL-MCNC: 11 MG/DL (ref 7–20)
CALCIUM SERPL-MCNC: 9.3 MG/DL (ref 8.3–10.6)
CHLORIDE SERPL-SCNC: 105 MMOL/L (ref 99–110)
CHOLEST SERPL-MCNC: 167 MG/DL (ref 0–199)
CO2 SERPL-SCNC: 25 MMOL/L (ref 21–32)
CREAT SERPL-MCNC: 0.8 MG/DL (ref 0.8–1.3)
DEPRECATED RDW RBC AUTO: 13.3 % (ref 12.4–15.4)
EOSINOPHIL # BLD: 0.2 K/UL (ref 0–0.6)
EOSINOPHIL NFR BLD: 3.7 %
GFR SERPLBLD CREATININE-BSD FMLA CKD-EPI: >90 ML/MIN/{1.73_M2}
GLUCOSE P FAST SERPL-MCNC: 93 MG/DL (ref 70–99)
HCT VFR BLD AUTO: 42.1 % (ref 40.5–52.5)
HDLC SERPL-MCNC: 72 MG/DL (ref 40–60)
HGB BLD-MCNC: 14.6 G/DL (ref 13.5–17.5)
LDL CHOLESTEROL: 80 MG/DL
LYMPHOCYTES # BLD: 1.5 K/UL (ref 1–5.1)
LYMPHOCYTES NFR BLD: 24.9 %
MCH RBC QN AUTO: 33.6 PG (ref 26–34)
MCHC RBC AUTO-ENTMCNC: 34.6 G/DL (ref 31–36)
MCV RBC AUTO: 97.1 FL (ref 80–100)
MONOCYTES # BLD: 0.8 K/UL (ref 0–1.3)
MONOCYTES NFR BLD: 13.1 %
NEUTROPHILS # BLD: 3.3 K/UL (ref 1.7–7.7)
NEUTROPHILS NFR BLD: 57.3 %
PLATELET # BLD AUTO: 254 K/UL (ref 135–450)
PMV BLD AUTO: 7.2 FL (ref 5–10.5)
POTASSIUM SERPL-SCNC: 4.4 MMOL/L (ref 3.5–5.1)
PROT SERPL-MCNC: 6.5 G/DL (ref 6.4–8.2)
RBC # BLD AUTO: 4.33 M/UL (ref 4.2–5.9)
SODIUM SERPL-SCNC: 141 MMOL/L (ref 136–145)
TRIGL SERPL-MCNC: 74 MG/DL (ref 0–150)
VLDLC SERPL CALC-MCNC: 15 MG/DL
WBC # BLD AUTO: 5.8 K/UL (ref 4–11)

## 2025-04-03 PROCEDURE — 80061 LIPID PANEL: CPT

## 2025-04-03 PROCEDURE — 36415 COLL VENOUS BLD VENIPUNCTURE: CPT

## 2025-04-03 PROCEDURE — 85025 COMPLETE CBC W/AUTO DIFF WBC: CPT

## 2025-04-03 PROCEDURE — 80053 COMPREHEN METABOLIC PANEL: CPT

## 2025-04-05 PROBLEM — E78.5 HYPERLIPEMIA: Status: RESOLVED | Noted: 2023-10-06 | Resolved: 2025-04-05

## 2025-04-05 NOTE — PROGRESS NOTES
distress.      Breath sounds: Normal breath sounds. No rales.   Musculoskeletal:      Cervical back: Normal range of motion and neck supple.      Right lower leg: No edema.      Left lower leg: No edema.   Lymphadenopathy:      Cervical: No cervical adenopathy.   Skin:     General: Skin is warm and dry.      Findings: Lesion (right temporal area) present.   Neurological:      General: No focal deficit present.      Mental Status: He is alert and oriented to person, place, and time.      Gait: Gait normal.   Psychiatric:         Mood and Affect: Mood normal.         Behavior: Behavior normal.         ASSESSMENT/PLAN:    Taco was seen today for follow-up.    Diagnoses and all orders for this visit:    Pure hypercholesterolemia  LDL is at goal of less than 100 with a value of 80.  HDL 72.  Triglycerides 74.  -     Comprehensive Metabolic Panel, Fasting; Future  -     CBC with Auto Differential; Future  -     Lipid, Fasting; Future  -     TSH reflex to FT4; Future    Erectile dysfunction, unspecified erectile dysfunction type  Continues to use Cialis or generic Viagra.    JOLLY (dyspnea on exertion)  Patient continues to smoke.  He has no dyspnea at rest but feels that his exercise tolerance has decreased significantly.  He had a negative cardiac workup  -     Neil Gonzalez MD, Pulmonary, [facility]    Screening for malignant neoplasm of prostate  -     PSA Screening; Future    Skin lesion  Patient was given a list of dermatologist and none took his insurance  Patient will try calling the [facility] dermatology group again.    Return in about 6 months (around 10/8/2025).    Please note portions of this note were completed with a voicerecognition program.  Efforts were made to edit the dictations but occasionally words are mis-transcribed.

## 2025-04-08 ENCOUNTER — OFFICE VISIT (OUTPATIENT)
Dept: FAMILY MEDICINE CLINIC | Age: 65
End: 2025-04-08
Payer: COMMERCIAL

## 2025-04-08 VITALS
BODY MASS INDEX: 25.07 KG/M2 | HEART RATE: 99 BPM | DIASTOLIC BLOOD PRESSURE: 64 MMHG | OXYGEN SATURATION: 97 % | WEIGHT: 156 LBS | HEIGHT: 66 IN | SYSTOLIC BLOOD PRESSURE: 128 MMHG

## 2025-04-08 DIAGNOSIS — N52.9 ERECTILE DYSFUNCTION, UNSPECIFIED ERECTILE DYSFUNCTION TYPE: ICD-10-CM

## 2025-04-08 DIAGNOSIS — R06.09 DOE (DYSPNEA ON EXERTION): ICD-10-CM

## 2025-04-08 DIAGNOSIS — L98.9 SKIN LESION: ICD-10-CM

## 2025-04-08 DIAGNOSIS — E78.00 PURE HYPERCHOLESTEROLEMIA: Primary | ICD-10-CM

## 2025-04-08 DIAGNOSIS — Z12.5 SCREENING FOR MALIGNANT NEOPLASM OF PROSTATE: ICD-10-CM

## 2025-04-08 PROCEDURE — G8419 CALC BMI OUT NRM PARAM NOF/U: HCPCS | Performed by: FAMILY MEDICINE

## 2025-04-08 PROCEDURE — 99214 OFFICE O/P EST MOD 30 MIN: CPT | Performed by: FAMILY MEDICINE

## 2025-04-08 PROCEDURE — 3017F COLORECTAL CA SCREEN DOC REV: CPT | Performed by: FAMILY MEDICINE

## 2025-04-08 PROCEDURE — G8427 DOCREV CUR MEDS BY ELIG CLIN: HCPCS | Performed by: FAMILY MEDICINE

## 2025-04-08 PROCEDURE — 4004F PT TOBACCO SCREEN RCVD TLK: CPT | Performed by: FAMILY MEDICINE

## 2025-04-08 SDOH — ECONOMIC STABILITY: FOOD INSECURITY: WITHIN THE PAST 12 MONTHS, THE FOOD YOU BOUGHT JUST DIDN'T LAST AND YOU DIDN'T HAVE MONEY TO GET MORE.: NEVER TRUE

## 2025-04-08 SDOH — ECONOMIC STABILITY: FOOD INSECURITY: WITHIN THE PAST 12 MONTHS, YOU WORRIED THAT YOUR FOOD WOULD RUN OUT BEFORE YOU GOT MONEY TO BUY MORE.: NEVER TRUE

## 2025-04-08 ASSESSMENT — ENCOUNTER SYMPTOMS
PHOTOPHOBIA: 0
DOUBLE VISION: 0
EYE REDNESS: 0
BLURRED VISION: 1
SHORTNESS OF BREATH: 1
VOMITING: 0
FOREIGN BODY SENSATION: 0
EYE ITCHING: 0
NAUSEA: 0
EYE DISCHARGE: 0

## 2025-04-08 ASSESSMENT — PATIENT HEALTH QUESTIONNAIRE - PHQ9
SUM OF ALL RESPONSES TO PHQ QUESTIONS 1-9: 0
1. LITTLE INTEREST OR PLEASURE IN DOING THINGS: NOT AT ALL
2. FEELING DOWN, DEPRESSED OR HOPELESS: NOT AT ALL
SUM OF ALL RESPONSES TO PHQ QUESTIONS 1-9: 0

## 2025-04-25 DIAGNOSIS — R93.1 ELEVATED CORONARY ARTERY CALCIUM SCORE: ICD-10-CM

## 2025-04-25 DIAGNOSIS — E78.5 HYPERLIPIDEMIA, UNSPECIFIED HYPERLIPIDEMIA TYPE: ICD-10-CM

## 2025-04-25 RX ORDER — ROSUVASTATIN CALCIUM 20 MG/1
20 TABLET, COATED ORAL DAILY
Qty: 90 TABLET | Refills: 1 | Status: SHIPPED | OUTPATIENT
Start: 2025-04-25

## 2025-05-29 ENCOUNTER — TELEPHONE (OUTPATIENT)
Dept: ADMINISTRATIVE | Age: 65
End: 2025-05-29

## 2025-05-29 ENCOUNTER — OFFICE VISIT (OUTPATIENT)
Dept: ENT CLINIC | Age: 65
End: 2025-05-29
Payer: COMMERCIAL

## 2025-05-29 VITALS
BODY MASS INDEX: 24.43 KG/M2 | WEIGHT: 152 LBS | HEART RATE: 84 BPM | SYSTOLIC BLOOD PRESSURE: 137 MMHG | DIASTOLIC BLOOD PRESSURE: 68 MMHG | HEIGHT: 66 IN | TEMPERATURE: 97.3 F | OXYGEN SATURATION: 97 %

## 2025-05-29 DIAGNOSIS — Z96.22 HISTORY OF TYMPANOSTOMY TUBE PLACEMENT: ICD-10-CM

## 2025-05-29 DIAGNOSIS — H66.3X1 CHRONIC SUPPURATIVE OTITIS MEDIA OF RIGHT EAR, UNSPECIFIED OTITIS MEDIA LOCATION: Primary | ICD-10-CM

## 2025-05-29 PROCEDURE — G8427 DOCREV CUR MEDS BY ELIG CLIN: HCPCS | Performed by: STUDENT IN AN ORGANIZED HEALTH CARE EDUCATION/TRAINING PROGRAM

## 2025-05-29 PROCEDURE — G8420 CALC BMI NORM PARAMETERS: HCPCS | Performed by: STUDENT IN AN ORGANIZED HEALTH CARE EDUCATION/TRAINING PROGRAM

## 2025-05-29 PROCEDURE — 3017F COLORECTAL CA SCREEN DOC REV: CPT | Performed by: STUDENT IN AN ORGANIZED HEALTH CARE EDUCATION/TRAINING PROGRAM

## 2025-05-29 PROCEDURE — 99214 OFFICE O/P EST MOD 30 MIN: CPT | Performed by: STUDENT IN AN ORGANIZED HEALTH CARE EDUCATION/TRAINING PROGRAM

## 2025-05-29 PROCEDURE — 4004F PT TOBACCO SCREEN RCVD TLK: CPT | Performed by: STUDENT IN AN ORGANIZED HEALTH CARE EDUCATION/TRAINING PROGRAM

## 2025-05-29 RX ORDER — CIPROFLOXACIN AND DEXAMETHASONE 3; 1 MG/ML; MG/ML
4 SUSPENSION/ DROPS AURICULAR (OTIC) 2 TIMES DAILY
Qty: 1 EACH | Refills: 0 | Status: SHIPPED | OUTPATIENT
Start: 2025-05-29 | End: 2025-05-30

## 2025-05-29 NOTE — TELEPHONE ENCOUNTER
Submitted PA for Ciprofloxacin-dexAMETHasone 0.3-0.1% suspension  Via [facility] BBAJRLFF  STATUS:     Information regarding your request  Please advise the dispensing pharmacy to contact the Pharmacy Help Line for assistance.     If this requires a response please respond to the pool ( P MHCX [facility] MEDICATION PRE-AUTH).      Thank you please advise patient.

## 2025-05-29 NOTE — PROGRESS NOTES
within the external auditory canal.  This was visualized under otomicroscopy and debrided with use #5 Bailey suction.  After full debridement tympanic membrane appears thickened with patent PE tube in anterior inferior quadrant.  4 drops of Ciprodex were placed in the external auditory canal.  Combo space at the distal aspect of the canal     -As: External auditory canal without stenosis, tympanic membrane clear, no middle ear effusions or retractions  FACE: HB 1/6 bilaterally, symmetric appearing, sensation equal bilaterally  ORAL CAVITY: No masses or lesions visualized or palpated, uvula is midline, moist mucous membranes, no oropharyngeal masses or oropharyngeal obstruction  NECK: Normal range of motion, no thyromegaly, trachea is midline, no palpable lymphadenopathy or neck masses, no crepitus  CHEST: Normal respiratory effort, breathing comfortably, no retractions  SKIN: No rashes, normal appearing skin, no evidence of skin lesions/tumors  NEURO: Cranial Nerves 2, 3, 4, 5, 6, 7, 11, 12 grossly intact bilaterally     I have performed a head and neck physical exam personally or was physically present during the key or critical portions of the service.      Assessment and Plan     1. Chronic suppurative otitis media of right ear, unspecified otitis media location  2. History of tympanostomy tube placement  -Right EAC debrided in the office under otomicroscopy.  Start Ciprodex to right ear twice daily x 10 days.  Recommend stricter precautions.  Follow-up in 2 weeks for reevaluation.      Follow-Up     Return in about 2 weeks (around 6/12/2025).      DO Syed Dempsey [facility]  Department of Otolaryngology/Head and Neck Surgery  5/29/25    Medical Decision Making:  The following items were considered in medical decision making:  Independent review of images  Review / order clinical lab tests  Review / order radiology tests  Decision to obtain old records      This note was

## 2025-05-29 NOTE — TELEPHONE ENCOUNTER
[id number] - [place], [place] - [address] - P [phone] - F [phone]  [address], [facility] [address]  Phone: [phone]  Fax: [phone]

## 2025-05-30 RX ORDER — NEOMYCIN SULFATE, POLYMYXIN B SULFATE AND HYDROCORTISONE 3.5; 10000; 1 MG/ML; [IU]/ML; MG/ML
4 SOLUTION AURICULAR (OTIC) 3 TIMES DAILY
Qty: 10 ML | Refills: 0 | Status: SHIPPED | OUTPATIENT
Start: 2025-05-30 | End: 2025-06-09

## 2025-05-30 NOTE — TELEPHONE ENCOUNTER
I called the pharmacy and spoke to the pharmacist, the insurance is requesting a alternative be sent in at would be covered under this patients plan.    Alternatives:    Ofloxacin neomycin-polymixin-hydrocortisone     Please advise thank you

## 2025-05-30 NOTE — TELEPHONE ENCOUNTER
Information regarding your request  Please advise the dispensing pharmacy to contact the Pharmacy Help Line for assistance.     PA is not required. Pharmacy needs to call help desk for assistance for paid claim    If this requires a response please respond to the pool ( P [facility] MEDICATION PRE-AUTH).      Thank you please advise patient.

## 2025-06-12 ENCOUNTER — OFFICE VISIT (OUTPATIENT)
Dept: ENT CLINIC | Age: 65
End: 2025-06-12
Payer: COMMERCIAL

## 2025-06-12 VITALS
TEMPERATURE: 97.7 F | HEART RATE: 83 BPM | OXYGEN SATURATION: 96 % | SYSTOLIC BLOOD PRESSURE: 135 MMHG | WEIGHT: 150 LBS | BODY MASS INDEX: 24.11 KG/M2 | HEIGHT: 66 IN | DIASTOLIC BLOOD PRESSURE: 68 MMHG

## 2025-06-12 DIAGNOSIS — Z96.22 HISTORY OF TYMPANOSTOMY TUBE PLACEMENT: ICD-10-CM

## 2025-06-12 DIAGNOSIS — H69.93 DYSFUNCTION OF BOTH EUSTACHIAN TUBES: ICD-10-CM

## 2025-06-12 DIAGNOSIS — H65.191 ACUTE OTITIS MEDIA WITH EFFUSION OF RIGHT EAR: Primary | ICD-10-CM

## 2025-06-12 PROCEDURE — G8427 DOCREV CUR MEDS BY ELIG CLIN: HCPCS | Performed by: STUDENT IN AN ORGANIZED HEALTH CARE EDUCATION/TRAINING PROGRAM

## 2025-06-12 PROCEDURE — 4004F PT TOBACCO SCREEN RCVD TLK: CPT | Performed by: STUDENT IN AN ORGANIZED HEALTH CARE EDUCATION/TRAINING PROGRAM

## 2025-06-12 PROCEDURE — G8420 CALC BMI NORM PARAMETERS: HCPCS | Performed by: STUDENT IN AN ORGANIZED HEALTH CARE EDUCATION/TRAINING PROGRAM

## 2025-06-12 PROCEDURE — 3017F COLORECTAL CA SCREEN DOC REV: CPT | Performed by: STUDENT IN AN ORGANIZED HEALTH CARE EDUCATION/TRAINING PROGRAM

## 2025-06-12 PROCEDURE — 99213 OFFICE O/P EST LOW 20 MIN: CPT | Performed by: STUDENT IN AN ORGANIZED HEALTH CARE EDUCATION/TRAINING PROGRAM

## 2025-06-12 NOTE — PROGRESS NOTES
equal bilaterally  ORAL CAVITY: No masses or lesions visualized or palpated, uvula is midline, moist mucous membranes, no oropharyngeal masses or oropharyngeal obstruction  NECK: Normal range of motion, no thyromegaly, trachea is midline, no palpable lymphadenopathy or neck masses, no crepitus  CHEST: Normal respiratory effort, breathing comfortably, no retractions  SKIN: No rashes, normal appearing skin, no evidence of skin lesions/tumors  NEURO: Cranial Nerves 2, 3, 4, 5, 6, 7, 11, 12 grossly intact bilaterally     I have performed a head and neck physical exam personally or was physically present during the key or critical portions of the service.    Assessment and Plan     1. Acute otitis media with effusion of right ear  2. History of tympanostomy tube placement  -Acute otitis significantly improved, no otorrhea on examination.  Finish 10-day course of use of ototoxic antibiotics.  Recommend dry ear precautions for the next couple weeks.  Follow-up for any significant changes in hearing, otorrhea, ear pain.    3.  Dysfunction of both eustachian tubes  - Continue Flonase on a daily basis      Follow-Up     Return if symptoms worsen or fail to improve.      Dr. Dimas Jj, DO [facility]  Department of Otolaryngology/Head and Neck Surgery  6/12/25    Medical Decision Making:  The following items were considered in medical decision making:  Independent review of images  Review / order clinical lab tests  Review / order radiology tests  Decision to obtain old records      This note was generated completely or in part utilizing Dragon dictation speech recognition software.  Occasionally, words are mistranscribed and despite editing, the text may contain inaccuracies due to incorrect word recognition.  If further clarification is needed please contact the office at [phone].

## 2025-07-29 ENCOUNTER — TELEPHONE (OUTPATIENT)
Dept: FAMILY MEDICINE CLINIC | Age: 65
End: 2025-07-29

## 2025-07-30 ENCOUNTER — OFFICE VISIT (OUTPATIENT)
Dept: FAMILY MEDICINE CLINIC | Age: 65
End: 2025-07-30
Payer: COMMERCIAL

## 2025-07-30 VITALS
WEIGHT: 152 LBS | OXYGEN SATURATION: 96 % | HEART RATE: 77 BPM | TEMPERATURE: 98.2 F | DIASTOLIC BLOOD PRESSURE: 70 MMHG | BODY MASS INDEX: 24.55 KG/M2 | SYSTOLIC BLOOD PRESSURE: 120 MMHG

## 2025-07-30 DIAGNOSIS — R55 NEAR SYNCOPE: ICD-10-CM

## 2025-07-30 DIAGNOSIS — M72.2 PLANTAR FASCIITIS OF LEFT FOOT: Primary | ICD-10-CM

## 2025-07-30 PROCEDURE — 3017F COLORECTAL CA SCREEN DOC REV: CPT | Performed by: FAMILY MEDICINE

## 2025-07-30 PROCEDURE — 99214 OFFICE O/P EST MOD 30 MIN: CPT | Performed by: FAMILY MEDICINE

## 2025-07-30 PROCEDURE — G8427 DOCREV CUR MEDS BY ELIG CLIN: HCPCS | Performed by: FAMILY MEDICINE

## 2025-07-30 PROCEDURE — 4004F PT TOBACCO SCREEN RCVD TLK: CPT | Performed by: FAMILY MEDICINE

## 2025-07-30 PROCEDURE — G8420 CALC BMI NORM PARAMETERS: HCPCS | Performed by: FAMILY MEDICINE

## 2025-07-30 NOTE — PROGRESS NOTES
SUBJECTIVE:    Taco Kerr is a 64 y.o. male who presents for a follow up visit.    Chief Complaint   Patient presents with    Foot Pain     Left foot pain x3w        Foot Pain   The pain is present in the left foot. This is a chronic problem. The current episode started more than 1 year ago. The problem occurs intermittently. The problem has been waxing and waning. The quality of the pain is described as aching. The pain is moderate. The symptoms are aggravated by standing. He has tried NSAIDS for the symptoms. The treatment provided moderate relief. There is no history of diabetes, gout, osteoarthritis or rheumatoid arthritis.   Dizziness  Quality:  Lightheadedness  Severity:  Severe  Onset quality:  Sudden  Timing:  Intermittent  Progression:  Worsening  Chronicity:  Recurrent (about 6 to 7 months)  Context: standing up    Relieved by:  Nothing  Worsened by:  Standing up  Ineffective treatments:  None tried       Patient's medications, allergies, past medical,surgical, social and family histories were reviewed and updated as appropriate.     Past Medical History:   Diagnosis Date    Carpal tunnel syndrome     Cervical radiculopathy     Erectile dysfunction 03/25/2022    Hyperlipemia 10/06/2023     Past Surgical History:   Procedure Laterality Date    APPENDECTOMY      ARM SURGERY Left 5/4/2023    LEFT ULNAR NERVE DECOMPRESSION (AT ELBOW) performed by Enzo Paniagua MD at [facility] OR    CARPAL TUNNEL RELEASE Left 12/21/2017    Left  Carpal Tunnel Release & Left Ulnar Nerve decompression at the Cubital Tunnel     COLONOSCOPY      ELBOW SURGERY      left ulnar nerve entrapment    KNEE ARTHROPLASTY Right 03/28/2017    RIGHT PARTIAL KNEE ARTHROPLASTY           SHOULDER SURGERY Right      Family History   Problem Relation Age of Onset    Heart Disease Brother 56    Hypertension Father     Stroke Father     Hypertension Brother     Alcohol Abuse Brother     Other Brother         Factor V Leiden    Other Mother

## 2025-08-20 ENCOUNTER — OFFICE VISIT (OUTPATIENT)
Dept: CARDIOLOGY CLINIC | Age: 65
End: 2025-08-20
Payer: COMMERCIAL

## 2025-08-20 VITALS
BODY MASS INDEX: 24.99 KG/M2 | DIASTOLIC BLOOD PRESSURE: 70 MMHG | OXYGEN SATURATION: 96 % | WEIGHT: 150 LBS | HEART RATE: 82 BPM | HEIGHT: 65 IN | SYSTOLIC BLOOD PRESSURE: 120 MMHG

## 2025-08-20 DIAGNOSIS — R06.09 DOE (DYSPNEA ON EXERTION): ICD-10-CM

## 2025-08-20 DIAGNOSIS — R55 NEAR SYNCOPE: ICD-10-CM

## 2025-08-20 DIAGNOSIS — R93.1 ELEVATED CORONARY ARTERY CALCIUM SCORE: Primary | ICD-10-CM

## 2025-08-20 DIAGNOSIS — E78.5 HYPERLIPIDEMIA, UNSPECIFIED HYPERLIPIDEMIA TYPE: ICD-10-CM

## 2025-08-20 DIAGNOSIS — Z72.0 TOBACCO ABUSE: ICD-10-CM

## 2025-08-20 DIAGNOSIS — R53.83 FATIGUE, UNSPECIFIED TYPE: ICD-10-CM

## 2025-08-20 PROCEDURE — 93000 ELECTROCARDIOGRAM COMPLETE: CPT | Performed by: STUDENT IN AN ORGANIZED HEALTH CARE EDUCATION/TRAINING PROGRAM

## 2025-08-20 PROCEDURE — 4004F PT TOBACCO SCREEN RCVD TLK: CPT | Performed by: STUDENT IN AN ORGANIZED HEALTH CARE EDUCATION/TRAINING PROGRAM

## 2025-08-20 PROCEDURE — G2211 COMPLEX E/M VISIT ADD ON: HCPCS | Performed by: STUDENT IN AN ORGANIZED HEALTH CARE EDUCATION/TRAINING PROGRAM

## 2025-08-20 PROCEDURE — G8427 DOCREV CUR MEDS BY ELIG CLIN: HCPCS | Performed by: STUDENT IN AN ORGANIZED HEALTH CARE EDUCATION/TRAINING PROGRAM

## 2025-08-20 PROCEDURE — G8420 CALC BMI NORM PARAMETERS: HCPCS | Performed by: STUDENT IN AN ORGANIZED HEALTH CARE EDUCATION/TRAINING PROGRAM

## 2025-08-20 PROCEDURE — 99214 OFFICE O/P EST MOD 30 MIN: CPT | Performed by: STUDENT IN AN ORGANIZED HEALTH CARE EDUCATION/TRAINING PROGRAM

## 2025-08-20 PROCEDURE — 3017F COLORECTAL CA SCREEN DOC REV: CPT | Performed by: STUDENT IN AN ORGANIZED HEALTH CARE EDUCATION/TRAINING PROGRAM

## 2025-09-02 ENCOUNTER — HOSPITAL ENCOUNTER (OUTPATIENT)
Dept: VASCULAR LAB | Age: 65
Discharge: HOME | End: 2025-09-04
Attending: STUDENT IN AN ORGANIZED HEALTH CARE EDUCATION/TRAINING PROGRAM
Payer: COMMERCIAL

## 2025-09-02 ENCOUNTER — HOSPITAL ENCOUNTER (OUTPATIENT)
Age: 65
Discharge: HOME | End: 2025-09-04
Attending: STUDENT IN AN ORGANIZED HEALTH CARE EDUCATION/TRAINING PROGRAM
Payer: COMMERCIAL

## 2025-09-02 VITALS
HEIGHT: 65 IN | BODY MASS INDEX: 24.99 KG/M2 | WEIGHT: 150 LBS | SYSTOLIC BLOOD PRESSURE: 128 MMHG | DIASTOLIC BLOOD PRESSURE: 77 MMHG

## 2025-09-02 DIAGNOSIS — R93.1 ELEVATED CORONARY ARTERY CALCIUM SCORE: ICD-10-CM

## 2025-09-02 DIAGNOSIS — R53.83 FATIGUE, UNSPECIFIED TYPE: ICD-10-CM

## 2025-09-02 LAB
ECHO AO ASC DIAM: 2.9 CM
ECHO AO ASCENDING AORTA INDEX: 1.66 CM/M2
ECHO AO ROOT DIAM: 3.4 CM
ECHO AO ROOT INDEX: 1.94 CM/M2
ECHO AV AREA PEAK VELOCITY: 3 CM2
ECHO AV AREA VTI: 2.9 CM2
ECHO AV AREA/BSA PEAK VELOCITY: 1.7 CM2/M2
ECHO AV AREA/BSA VTI: 1.7 CM2/M2
ECHO AV MEAN GRADIENT: 3 MMHG
ECHO AV MEAN GRADIENT: 3 MMHG
ECHO AV MEAN VELOCITY: 0.9 M/S
ECHO AV PEAK GRADIENT: 5 MMHG
ECHO AV PEAK VELOCITY: 1.1 M/S
ECHO AV VELOCITY RATIO: 1
ECHO AV VTI: 26 CM
ECHO BSA: 1.77 M2
ECHO EST RA PRESSURE: 3 MMHG
ECHO LA AREA 2C: 18.4 CM2
ECHO LA AREA 4C: 13.2 CM2
ECHO LA DIAMETER INDEX: 2.11 CM/M2
ECHO LA DIAMETER: 3.7 CM
ECHO LA MAJOR AXIS: 4.7 CM
ECHO LA MINOR AXIS: 5.3 CM
ECHO LA TO AORTIC ROOT RATIO: 1.09
ECHO LA VOL BP: 41 ML (ref 18–58)
ECHO LA VOL MOD A2C: 52 ML (ref 18–58)
ECHO LA VOL MOD A4C: 29 ML (ref 18–58)
ECHO LA VOL/BSA BIPLANE: 23 ML/M2 (ref 16–34)
ECHO LA VOLUME INDEX MOD A2C: 30 ML/M2 (ref 16–34)
ECHO LA VOLUME INDEX MOD A4C: 17 ML/M2 (ref 16–34)
ECHO LV E' LATERAL VELOCITY: 11.1 CM/S
ECHO LV E' SEPTAL VELOCITY: 8.7 CM/S
ECHO LV EDV A2C: 75 ML
ECHO LV EDV A4C: 80 ML
ECHO LV EDV INDEX A4C: 46 ML/M2
ECHO LV EDV NDEX A2C: 43 ML/M2
ECHO LV EF PHYSICIAN: 60 %
ECHO LV EJECTION FRACTION A2C: 65 %
ECHO LV EJECTION FRACTION A4C: 57 %
ECHO LV EJECTION FRACTION BIPLANE: 60 % (ref 55–100)
ECHO LV ESV A2C: 27 ML
ECHO LV ESV A4C: 34 ML
ECHO LV ESV INDEX A2C: 15 ML/M2
ECHO LV ESV INDEX A4C: 19 ML/M2
ECHO LV FRACTIONAL SHORTENING: 46 % (ref 28–44)
ECHO LV INTERNAL DIMENSION DIASTOLE INDEX: 2.11 CM/M2
ECHO LV INTERNAL DIMENSION DIASTOLIC: 3.7 CM (ref 4.2–5.9)
ECHO LV INTERNAL DIMENSION SYSTOLIC INDEX: 1.14 CM/M2
ECHO LV INTERNAL DIMENSION SYSTOLIC: 2 CM
ECHO LV IVSD: 1.3 CM (ref 0.6–1)
ECHO LV MASS 2D: 166.5 G (ref 88–224)
ECHO LV MASS INDEX 2D: 95.1 G/M2 (ref 49–115)
ECHO LV POSTERIOR WALL DIASTOLIC: 1.3 CM (ref 0.6–1)
ECHO LV RELATIVE WALL THICKNESS RATIO: 0.7
ECHO LVOT AREA: 3.1 CM2
ECHO LVOT AV VTI INDEX: 0.92
ECHO LVOT DIAM: 2 CM
ECHO LVOT MEAN GRADIENT: 2 MMHG
ECHO LVOT PEAK GRADIENT: 5 MMHG
ECHO LVOT PEAK VELOCITY: 1.1 M/S
ECHO LVOT STROKE VOLUME INDEX: 42.9 ML/M2
ECHO LVOT SV: 75 ML
ECHO LVOT VTI: 23.9 CM
ECHO MV A VELOCITY: 0.59 M/S
ECHO MV E VELOCITY: 0.85 M/S
ECHO MV E/A RATIO: 1.44
ECHO MV E/E' LATERAL: 7.66
ECHO MV E/E' RATIO (AVERAGED): 8.71
ECHO MV E/E' SEPTAL: 9.77
ECHO PV MAX VELOCITY: 0.8 M/S
ECHO PV PEAK GRADIENT: 2 MMHG
ECHO RA AREA 4C: 13.7 CM2
ECHO RA END SYSTOLIC VOLUME APICAL 4 CHAMBER INDEX BSA: 15 ML/M2
ECHO RA VOLUME: 27 ML
ECHO RIGHT VENTRICULAR SYSTOLIC PRESSURE (RVSP): 23 MMHG
ECHO RV BASAL DIMENSION: 3.4 CM
ECHO RV FREE WALL PEAK S': 15.1 CM/S
ECHO RV LONGITUDINAL DIMENSION: 6.8 CM
ECHO RV MID DIMENSION: 2.2 CM
ECHO RV TAPSE: 2.2 CM (ref 1.7–?)
ECHO TV REGURGITANT MAX VELOCITY: 2.22 M/S
ECHO TV REGURGITANT PEAK GRADIENT: 20 MMHG
VAS LEFT ABI: 1.28
VAS LEFT ANKLE BP: 153 MMHG
VAS LEFT ARM BP: 120 MMHG
VAS LEFT DORSALIS PEDIS BP: 149 MMHG
VAS LEFT PTA BP: 153 MMHG
VAS LEFT TBI: 1.03
VAS LEFT TOE PRESSURE: 124 MMHG
VAS RIGHT ABI: 1.18
VAS RIGHT ANKLE BP: 142 MMHG
VAS RIGHT ARM BP: 118 MMHG
VAS RIGHT DORSALIS PEDIS BP: 134 MMHG
VAS RIGHT PTA BP: 142 MMHG
VAS RIGHT TBI: 1.08
VAS RIGHT TOE PRESSURE: 130 MMHG

## 2025-09-02 PROCEDURE — 93306 TTE W/DOPPLER COMPLETE: CPT | Performed by: STUDENT IN AN ORGANIZED HEALTH CARE EDUCATION/TRAINING PROGRAM

## 2025-09-02 PROCEDURE — 93306 TTE W/DOPPLER COMPLETE: CPT

## 2025-09-02 PROCEDURE — 93922 UPR/L XTREMITY ART 2 LEVELS: CPT

## 2025-09-02 PROCEDURE — 93922 UPR/L XTREMITY ART 2 LEVELS: CPT | Performed by: INTERNAL MEDICINE

## (undated) DEVICE — SUTURE CHROMIC GUT SZ 4-0 L27IN ABSRB BRN FS-2 L19MM 3/8 635H

## (undated) DEVICE — PADDING UNDERCAST W4INXL4YD 100% COT CRIMPED FINISH WBRL II

## (undated) DEVICE — SUTURE VCRL + SZ 3-0 L27IN ABSRB UD L26MM SH 1/2 CIR VCP416H

## (undated) DEVICE — SHEET,DRAPE,53X77,STERILE: Brand: MEDLINE

## (undated) DEVICE — WILLIS PACK: Brand: MEDLINE INDUSTRIES, INC.

## (undated) DEVICE — BANDAGE COMPR W2INXL5YD TAN BRTH SELF ADH WRP W/ HND TEAR

## (undated) DEVICE — BANDAGE COMPR W4INXL15FT BGE E SGL LAYERED CLP CLSR